# Patient Record
Sex: MALE | Race: WHITE | NOT HISPANIC OR LATINO | Employment: FULL TIME | RURAL
[De-identification: names, ages, dates, MRNs, and addresses within clinical notes are randomized per-mention and may not be internally consistent; named-entity substitution may affect disease eponyms.]

---

## 2017-01-20 ENCOUNTER — OFFICE VISIT (OUTPATIENT)
Dept: FAMILY MEDICINE CLINIC | Facility: CLINIC | Age: 42
End: 2017-01-20

## 2017-01-20 VITALS
BODY MASS INDEX: 40.43 KG/M2 | HEIGHT: 74 IN | OXYGEN SATURATION: 96 % | TEMPERATURE: 98.2 F | WEIGHT: 315 LBS | HEART RATE: 100 BPM | RESPIRATION RATE: 16 BRPM | SYSTOLIC BLOOD PRESSURE: 148 MMHG | DIASTOLIC BLOOD PRESSURE: 100 MMHG

## 2017-01-20 DIAGNOSIS — I10 ESSENTIAL HYPERTENSION: Primary | Chronic | ICD-10-CM

## 2017-01-20 DIAGNOSIS — E78.5 HYPERLIPIDEMIA, UNSPECIFIED HYPERLIPIDEMIA TYPE: Chronic | ICD-10-CM

## 2017-01-20 DIAGNOSIS — R73.01 ELEVATED FASTING GLUCOSE: Chronic | ICD-10-CM

## 2017-01-20 PROBLEM — K21.9 GASTROESOPHAGEAL REFLUX DISEASE: Chronic | Status: ACTIVE | Noted: 2017-01-20

## 2017-01-20 PROBLEM — N46.9 INFERTILITY MALE: Status: ACTIVE | Noted: 2017-01-20

## 2017-01-20 PROBLEM — E66.9 OBESITY: Chronic | Status: ACTIVE | Noted: 2017-01-20

## 2017-01-20 PROBLEM — N20.9 UROLITHIASIS: Chronic | Status: ACTIVE | Noted: 2017-01-20

## 2017-01-20 PROBLEM — Z00.00 WELLNESS EXAMINATION: Status: ACTIVE | Noted: 2017-01-20

## 2017-01-20 PROCEDURE — 99213 OFFICE O/P EST LOW 20 MIN: CPT | Performed by: FAMILY MEDICINE

## 2017-01-20 RX ORDER — TADALAFIL 20 MG/1
20 TABLET ORAL DAILY PRN
COMMUNITY

## 2017-01-20 RX ORDER — DICLOFENAC SODIUM 75 MG/1
1 TABLET, DELAYED RELEASE ORAL 2 TIMES DAILY PRN
COMMUNITY
Start: 2017-01-08 | End: 2017-07-17 | Stop reason: SDUPTHER

## 2017-01-20 RX ORDER — HYDROCHLOROTHIAZIDE 12.5 MG/1
12.5 TABLET ORAL DAILY
Qty: 90 TABLET | Refills: 0 | Status: SHIPPED | OUTPATIENT
Start: 2017-01-20 | End: 2017-03-31 | Stop reason: DRUGHIGH

## 2017-01-20 NOTE — PROGRESS NOTES
Subjective   Hank Hanna is a 42 y.o. male presenting with chief complaint of:   Chief Complaint   Patient presents with   • Heartburn   • Hyperlipidemia   • Hypertension       History of Present Illness :  Alone.  Has chronic illness; HTN, elevated FBS, hyperlipidemia.  Has had for years; chronic HTN. BP for [facility] exam was recently ok.  No regular home monitoring.   No chest pain, SOB, minimal occ leg edema since Norvasc 5 qd.     Other chronic problem/s to consider:  Elevated fasting glucose: This has been present for years/over a year.  It is chronic.  It is controlled.  No home accuchecks have yet been requested.  No signs hypoglycemia manifest as syncope/near syncope or diaphoretic/sweating episodes.   Hyperlipidemia: This has been present for years/over a year.  It is chronic.  Infrequent labs; due  Obesity: This has been present for years/over a year.  It is chronic and advised diet/exercise before.     The following portions of the patient's history were reviewed and updated as appropriate: allergies, current medications, past family history, past medical history, past social history, past surgical history and problem list.  TCC migrated      Current Outpatient Prescriptions:   •  amLODIPine (NORVASC) 5 MG tablet, TAKE 1 TABLET BY MOUTH EVERY DAY, Disp: 90 tablet, Rfl: 1  •  diclofenac (VOLTAREN) 75 MG EC tablet, Take 1 tablet by mouth 2 (Two) Times a Day As Needed., Disp: , Rfl:   •  losartan (COZAAR) 100 MG tablet, 1 TABLET BY MOUTH DAILY, Disp: 90 tablet, Rfl: 1  •  Omeprazole Magnesium (PRILOSEC OTC PO), Take  by mouth Daily., Disp: , Rfl:   •  tadalafil (CIALIS) 20 MG tablet, Take 20 mg by mouth Daily As Needed for erectile dysfunction., Disp: , Rfl:     No Known Allergies    Review of Systems    GENERAL:  Active/slower with limits, speed, samni for age. Sleeps ok. No fever.  ENDO:  No syncope, near or diaphoretic sweaty spells.    HEENT: No head injury or headache,  No vision change, No hearing  "loss/pain/drainage.  No sore throat.  No nasal/sinus congestion/drainage. No epistaxis.  CHEST: No chest wall tenderness or mass. No cough, wheeze, SOB or hemoptysis.  CV: No chest pain, palpatations, ankle edema.  GI: No heartburn, dysphagia, abdominal pain, diarrhea, constipation, rectal bleeding, or melena.  :  Voids without dysuria, or incontience to completion.  ORTHO: No painful/swollen joints but various on /off sore.  No sore neck or back.  No acute neck or back pain without recent injury.   NEURO: No dizziness, weakness of extremities.  No numbness/parethesias.   PSYCH: No memory loss.  Mood good; not anxious, depressed or suicidal.  Tolerated stress.     Work labs about 6m ago; labs ok.     Objective   Visit Vitals   • /100 (BP Location: Right arm, Patient Position: Sitting, Cuff Size: Large Adult)   • Pulse 100   • Temp 98.2 °F (36.8 °C) (Oral)   • Resp 16   • Ht 74\" (188 cm)   • Wt (!) 319 lb (145 kg)   • SpO2 96%   • BMI 40.96 kg/m2       Physical Exam    GENERAL:  Well nourished/developed  in no acute distress. Obese.   SKIN: Turgor excellent, without wound, rash, lesion.   HEENT: Normal cephalic without trauma.  Pupils equal round reactive to light. Extraocular motions full without nystagmus.   External canals nonobstructive nontender without reddness. Tymphatic membranes john with mary structures intact.   Oral cavity without growths, exudates, and moist.  Posterior pharnyx without mass, obstruction, reddness.  No thyroidmegaly, mass, tenderness, lymphadenopathy and supple.   CV: Regular rhythm.  No murmur, gallop, edema. Posterior pulses intact.  No carotid bruits.   CHEST: No chest wall tenderness or mass.   LUNGS: Symmetric motion with clear to auscultation.  No dullness to percussion.   ABD: Soft, nontender without mass.   ORTHO: Symmetric extremities without swelling/point tenderness.  Full gross range of motion.    NEURO: CN 2-12 grossly intact.  Symmetric facies. 2/4 x 4 biceps, " knees equal reflexes.  UE/LE   4-5/5 strength throughout.  Nonfocal use extremities. Speech clear. Intact light touch with monofilament, vibratory sensation with tuning fork; equal toes/distal feet.    PSYCH: Oriented x 3.  Pleasant calm, well kept.  Purposeful/directed conservation with intact short/long gross memory.     Assessment/Plan     1. Essential hypertension  May not be controlled    2. Hyperlipidemia, unspecified hyperlipidemia type  Followed without Rx so far    3. Elevated fasting glucose  watching    Home bp monitoring advised-see below  Rx: reviewed.  Changes above and add HCTZ  LAB: reviewed above, orders above and 6/12m  Wrap-up/other instructions  Regular cardio exercise something everyone should consider and try to do; discussed  Normal weight a goal for everyone; discussed  Healthy diet helpful for weight management, illness prevention; discussed  Patient Instructions   Watch for signs of low potassium; unexpected fatigue, muscle cramps    Your labs should be   6m chemistry and A1c  12m yearly complete labs    Watch your blood pressure; your goal should be 140-130 top and 90-80 bottom      Follow up: Return in about 3 months (around 4/20/2017).

## 2017-01-20 NOTE — PATIENT INSTRUCTIONS
Watch for signs of low potassium; unexpected fatigue, muscle cramps    Your labs should be   6m chemistry and A1c  12m yearly complete labs    Watch your blood pressure; your goal should be 140-130 top and 90-80 bottom

## 2017-01-20 NOTE — MR AVS SNAPSHOT
Hank Hanna   1/20/2017 2:30 PM   Office Visit    Dept Phone:  [phone]   Encounter #:  [id number]    Provider:  Rajan Saxena MD   Department:  [facility]                Your Full Care Plan              Today's Medication Changes          These changes are accurate as of: 1/20/17  3:29 PM.  If you have any questions, ask your nurse or doctor.               New Medication(s)Ordered:     hydrochlorothiazide 12.5 MG tablet   Commonly known as:  HYDRODIURIL   Take 1 tablet by mouth Daily.   Started by:  Rajan Saxena MD         Medication(s)that have changed:     diclofenac 75 MG EC tablet   Commonly known as:  VOLTAREN   Take 1 tablet by mouth 2 (Two) Times a Day As Needed.   What changed:  Another medication with the same name was removed. Continue taking this medication, and follow the directions you see here.   Changed by:  Rajan Saxena MD            Where to Get Your Medications      These medications were sent to [facility]/pharmacy #[id number] - [place], [place] - [address] AT ACROSS FROM [address]  [phone] [facility] [phone]   [address] [place] [address]    Hours:  24-hours Phone:  [phone]     hydrochlorothiazide 12.5 MG tablet                  Your Updated Medication List          This list is accurate as of: 1/20/17  3:29 PM.  Always use your most recent med list.                amLODIPine 5 MG tablet   Commonly known as:  NORVASC   TAKE 1 TABLET BY MOUTH EVERY DAY       CIALIS 20 MG tablet   Generic drug:  tadalafil       diclofenac 75 MG EC tablet   Commonly known as:  VOLTAREN       hydrochlorothiazide 12.5 MG tablet   Commonly known as:  HYDRODIURIL   Take 1 tablet by mouth Daily.       losartan 100 MG tablet   Commonly known as:  COZAAR   1 TABLET BY MOUTH DAILY       PRILOSEC OTC PO               You Were Diagnosed With        Codes Comments    Essential hypertension    -  Primary ICD-10-CM: I10  ICD-9-CM:  "401.9     Hyperlipidemia, unspecified hyperlipidemia type     ICD-10-CM: E78.5  ICD-9-CM: 272.4     Elevated fasting glucose     ICD-10-CM: R73.01  ICD-9-CM: 790.21       Instructions    Watch for signs of low potassium; unexpected fatigue, muscle cramps    Your labs should be   6m chemistry and A1c  12m yearly complete labs    Watch your blood pressure; your goal should be 140-130 top and 90-80 bottom     Patient Instructions History      Upcoming Appointments     Visit Type Date Time Department    FOLLOW UP 1/20/2017  2:30 PM [facility]    FOLLOW UP 5/5/2017  2:45 PM [facility]      MyChart Signup     Our records indicate that you have declined [employer] signup. If you would like to sign up for [employer], please email [email or web address] or call [phone] to obtain an activation code.             Other Info from Your Visit           Your Appointments     May 05, 2017  2:45 PM CDT   Follow Up with Amadeo Fleming MD   [facility] MEDICAL [facility] FAMILY MEDICINE (--)    [address] [place] [id number] [phone]           Arrive 15 minutes prior to appointment.              Allergies     No Known Allergies      Reason for Visit     Heartburn     Hyperlipidemia     Hypertension           Vital Signs     Blood Pressure Pulse Temperature Respirations Height Weight    148/100 (BP Location: Right arm, Patient Position: Sitting, Cuff Size: Large Adult) 100 98.2 °F (36.8 °C) (Oral) 16 74\" (188 cm) 319 lb (145 kg)    Oxygen Saturation Body Mass Index Smoking Status             96% 40.96 kg/m2 Former Smoker         Problems and Diagnoses Noted     Elevated fasting glucose    Acid reflux disease    High cholesterol or triglycerides    High blood pressure    Infertility male    Obesity    Urinary tract stone    Wellness examination        "

## 2017-03-30 ENCOUNTER — TELEPHONE (OUTPATIENT)
Dept: FAMILY MEDICINE CLINIC | Facility: CLINIC | Age: 42
End: 2017-03-30

## 2017-03-30 NOTE — TELEPHONE ENCOUNTER
He says that he has 2 bp meds that Dr. Saxena was going to combine into 1 pill. He thought it was HCTZ and the Amlodipine. [facility] [place]

## 2017-03-31 RX ORDER — AMLODIPINE BESYLATE 5 MG/1
5 TABLET ORAL DAILY
Qty: 90 TABLET | Refills: 1 | Status: SHIPPED | OUTPATIENT
Start: 2017-03-31 | End: 2017-10-13 | Stop reason: SDUPTHER

## 2017-03-31 RX ORDER — LOSARTAN POTASSIUM AND HYDROCHLOROTHIAZIDE 12.5; 1 MG/1; MG/1
1 TABLET ORAL DAILY
Qty: 90 TABLET | Refills: 1 | Status: SHIPPED | OUTPATIENT
Start: 2017-03-31 | End: 2017-09-18 | Stop reason: SDUPTHER

## 2017-03-31 NOTE — TELEPHONE ENCOUNTER
No; it was cozaar 100 he was on and the HCTZ 12,5  we gave him; change that to Hyzaar 100/12.5 one a day  (need to fix chart to say PCP JOSEPHINE Saxena and f/u 5.2017 JOSEPHINE Saxena unless he after seeing me is trying to change to Dr Fleming ?????

## 2017-07-17 RX ORDER — DICLOFENAC SODIUM 75 MG/1
TABLET, DELAYED RELEASE ORAL
Qty: 60 TABLET | Refills: 0 | Status: SHIPPED | OUTPATIENT
Start: 2017-07-17 | End: 2017-08-23 | Stop reason: SDUPTHER

## 2017-08-23 RX ORDER — DICLOFENAC SODIUM 75 MG/1
TABLET, DELAYED RELEASE ORAL
Qty: 60 TABLET | Refills: 0 | Status: SHIPPED | OUTPATIENT
Start: 2017-08-23 | End: 2017-10-30 | Stop reason: SDUPTHER

## 2017-09-18 RX ORDER — LOSARTAN POTASSIUM AND HYDROCHLOROTHIAZIDE 12.5; 1 MG/1; MG/1
TABLET ORAL
Qty: 90 TABLET | Refills: 1 | Status: SHIPPED | OUTPATIENT
Start: 2017-09-18 | End: 2018-02-19 | Stop reason: SDUPTHER

## 2017-10-13 RX ORDER — AMLODIPINE BESYLATE 5 MG/1
TABLET ORAL
Qty: 90 TABLET | Refills: 1 | Status: SHIPPED | OUTPATIENT
Start: 2017-10-13 | End: 2018-04-16 | Stop reason: SDUPTHER

## 2017-10-30 RX ORDER — DICLOFENAC SODIUM 75 MG/1
TABLET, DELAYED RELEASE ORAL
Qty: 60 TABLET | Refills: 0 | Status: SHIPPED | OUTPATIENT
Start: 2017-10-30 | End: 2017-12-06 | Stop reason: SDUPTHER

## 2017-12-06 RX ORDER — DICLOFENAC SODIUM 75 MG/1
TABLET, DELAYED RELEASE ORAL
Qty: 60 TABLET | Refills: 5 | Status: SHIPPED | OUTPATIENT
Start: 2017-12-06 | End: 2018-09-11 | Stop reason: SDUPTHER

## 2018-02-19 RX ORDER — LOSARTAN POTASSIUM AND HYDROCHLOROTHIAZIDE 12.5; 1 MG/1; MG/1
TABLET ORAL
Qty: 90 TABLET | Refills: 1 | Status: SHIPPED | OUTPATIENT
Start: 2018-02-19 | End: 2018-08-16 | Stop reason: SDUPTHER

## 2018-04-16 RX ORDER — AMLODIPINE BESYLATE 5 MG/1
TABLET ORAL
Qty: 90 TABLET | Refills: 0 | Status: SHIPPED | OUTPATIENT
Start: 2018-04-16 | End: 2018-08-05 | Stop reason: SDUPTHER

## 2018-08-06 RX ORDER — AMLODIPINE BESYLATE 5 MG/1
TABLET ORAL
Qty: 90 TABLET | Refills: 1 | Status: SHIPPED | OUTPATIENT
Start: 2018-08-06 | End: 2019-01-24 | Stop reason: SDUPTHER

## 2018-08-16 RX ORDER — LOSARTAN POTASSIUM AND HYDROCHLOROTHIAZIDE 12.5; 1 MG/1; MG/1
TABLET ORAL
Qty: 90 TABLET | Refills: 0 | Status: SHIPPED | OUTPATIENT
Start: 2018-08-16 | End: 2018-10-01 | Stop reason: SDUPTHER

## 2018-09-11 RX ORDER — DICLOFENAC SODIUM 75 MG/1
TABLET, DELAYED RELEASE ORAL
Qty: 60 TABLET | Refills: 0 | Status: SHIPPED | OUTPATIENT
Start: 2018-09-11 | End: 2018-10-28 | Stop reason: SDUPTHER

## 2018-10-01 RX ORDER — LOSARTAN POTASSIUM AND HYDROCHLOROTHIAZIDE 12.5; 1 MG/1; MG/1
TABLET ORAL
Qty: 90 TABLET | Refills: 0 | Status: SHIPPED | OUTPATIENT
Start: 2018-10-01 | End: 2018-12-23 | Stop reason: SDUPTHER

## 2018-10-29 RX ORDER — DICLOFENAC SODIUM 75 MG/1
TABLET, DELAYED RELEASE ORAL
Qty: 60 TABLET | Refills: 0 | Status: SHIPPED | OUTPATIENT
Start: 2018-10-29 | End: 2018-11-27 | Stop reason: SDUPTHER

## 2018-11-27 RX ORDER — DICLOFENAC SODIUM 75 MG/1
75 TABLET, DELAYED RELEASE ORAL 2 TIMES DAILY
Qty: 180 TABLET | Refills: 0 | Status: SHIPPED | OUTPATIENT
Start: 2018-11-27 | End: 2019-03-09 | Stop reason: SDUPTHER

## 2018-12-11 ENCOUNTER — OFFICE VISIT (OUTPATIENT)
Dept: FAMILY MEDICINE CLINIC | Facility: CLINIC | Age: 43
End: 2018-12-11

## 2018-12-11 VITALS
HEIGHT: 74 IN | WEIGHT: 315 LBS | TEMPERATURE: 98.6 F | DIASTOLIC BLOOD PRESSURE: 80 MMHG | SYSTOLIC BLOOD PRESSURE: 142 MMHG | OXYGEN SATURATION: 95 % | HEART RATE: 105 BPM | RESPIRATION RATE: 18 BRPM | BODY MASS INDEX: 40.43 KG/M2

## 2018-12-11 DIAGNOSIS — R73.01 ELEVATED FASTING GLUCOSE: Chronic | ICD-10-CM

## 2018-12-11 DIAGNOSIS — I10 ESSENTIAL HYPERTENSION: Chronic | ICD-10-CM

## 2018-12-11 DIAGNOSIS — E66.01 CLASS 3 SEVERE OBESITY WITH BODY MASS INDEX (BMI) OF 40.0 TO 44.9 IN ADULT, UNSPECIFIED OBESITY TYPE, UNSPECIFIED WHETHER SERIOUS COMORBIDITY PRESENT (HCC): ICD-10-CM

## 2018-12-11 DIAGNOSIS — E78.5 HYPERLIPIDEMIA, UNSPECIFIED HYPERLIPIDEMIA TYPE: Primary | Chronic | ICD-10-CM

## 2018-12-11 DIAGNOSIS — K21.9 GASTROESOPHAGEAL REFLUX DISEASE, ESOPHAGITIS PRESENCE NOT SPECIFIED: Chronic | ICD-10-CM

## 2018-12-11 PROBLEM — Z01.89 LABORATORY TEST: Status: ACTIVE | Noted: 2018-12-11

## 2018-12-11 PROBLEM — E66.813 CLASS 3 SEVERE OBESITY IN ADULT: Status: ACTIVE | Noted: 2017-01-20

## 2018-12-11 PROBLEM — Z87.442 HISTORY OF KIDNEY STONES: Status: ACTIVE | Noted: 2017-01-20

## 2018-12-11 LAB
ALBUMIN SERPL-MCNC: 4.5 G/DL (ref 3.5–5)
ALBUMIN/GLOB SERPL: 1.5 G/DL (ref 1.1–2.5)
ALP SERPL-CCNC: 71 U/L (ref 24–120)
ALT SERPL-CCNC: 75 U/L (ref 0–54)
AST SERPL-CCNC: 37 U/L (ref 7–45)
BASOPHILS # BLD AUTO: 0.06 10*3/MM3 (ref 0–0.2)
BASOPHILS NFR BLD AUTO: 0.9 % (ref 0–2)
BILIRUB SERPL-MCNC: 0.8 MG/DL (ref 0.1–1)
BUN SERPL-MCNC: 12 MG/DL (ref 5–21)
BUN/CREAT SERPL: 14 (ref 7–25)
CALCIUM SERPL-MCNC: 9.2 MG/DL (ref 8.4–10.4)
CHLORIDE SERPL-SCNC: 100 MMOL/L (ref 98–110)
CHOLEST SERPL-MCNC: 225 MG/DL (ref 130–200)
CO2 SERPL-SCNC: 28 MMOL/L (ref 24–31)
CREAT SERPL-MCNC: 0.86 MG/DL (ref 0.5–1.4)
EOSINOPHIL # BLD AUTO: 0.08 10*3/MM3 (ref 0–0.7)
EOSINOPHIL NFR BLD AUTO: 1.1 % (ref 0–4)
ERYTHROCYTE [DISTWIDTH] IN BLOOD BY AUTOMATED COUNT: 13.6 % (ref 12–15)
GLOBULIN SER CALC-MCNC: 3.1 GM/DL
GLUCOSE SERPL-MCNC: 120 MG/DL (ref 70–100)
HBA1C MFR BLD: 6 %
HCT VFR BLD AUTO: 41.4 % (ref 40–52)
HDLC SERPL-MCNC: 44 MG/DL
HGB BLD-MCNC: 13.4 G/DL (ref 14–18)
IMM GRANULOCYTES # BLD: 0.06 10*3/MM3 (ref 0–0.03)
IMM GRANULOCYTES NFR BLD: 0.9 % (ref 0–5)
LDLC SERPL CALC-MCNC: 146 MG/DL (ref 0–99)
LYMPHOCYTES # BLD AUTO: 1.65 10*3/MM3 (ref 0.72–4.86)
LYMPHOCYTES NFR BLD AUTO: 23.4 % (ref 15–45)
MCH RBC QN AUTO: 27.9 PG (ref 28–32)
MCHC RBC AUTO-ENTMCNC: 32.4 G/DL (ref 33–36)
MCV RBC AUTO: 86.1 FL (ref 82–95)
MONOCYTES # BLD AUTO: 0.64 10*3/MM3 (ref 0.19–1.3)
MONOCYTES NFR BLD AUTO: 9.1 % (ref 4–12)
NEUTROPHILS # BLD AUTO: 4.55 10*3/MM3 (ref 1.87–8.4)
NEUTROPHILS NFR BLD AUTO: 64.6 % (ref 39–78)
NRBC BLD AUTO-RTO: 0 /100 WBC (ref 0–0)
PLATELET # BLD AUTO: 324 10*3/MM3 (ref 130–400)
POTASSIUM SERPL-SCNC: 4.4 MMOL/L (ref 3.5–5.3)
PROT SERPL-MCNC: 7.6 G/DL (ref 6.3–8.7)
RBC # BLD AUTO: 4.81 10*6/MM3 (ref 4.8–5.9)
SODIUM SERPL-SCNC: 141 MMOL/L (ref 135–145)
TRIGL SERPL-MCNC: 174 MG/DL (ref 0–149)
VLDLC SERPL CALC-MCNC: 34.8 MG/DL
WBC # BLD AUTO: 7.04 10*3/MM3 (ref 4.8–10.8)

## 2018-12-11 PROCEDURE — 99214 OFFICE O/P EST MOD 30 MIN: CPT | Performed by: FAMILY MEDICINE

## 2018-12-11 NOTE — PROGRESS NOTES
Subjective   Hank Hanna is a 43 y.o. male presenting with chief complaint of:   Chief Complaint   Patient presents with   • Hypertension     Follow-up for medication refills.   • Hyperlipidemia   • Heartburn       History of Present Illness :  Alone.   Has multiple chronic problems to consider that might have a bearing on today's issues;  an interval appointment.       Chronic/acute problems reviewed today:   1. Hyperlipidemia, unspecified hyperlipidemia type: Chronic/stable: prefers no statin, diet-exercise advised and lab monitored.      2. Essential hypertension: Chronic/stable. Stable here/if home blood pressures.  No significant chest pain, SOB, LE edema, orthopnea, near syncope, dizziness/light headness.      3. Gastroesophageal reflux disease, esophagitis presence not specified: Chronic/stable.  Controlled heartburn, reflux; no dysphagia, melena.  Rx prilosec helps.      4. Class 3 severe obesity with body mass index (BMI) of 40.0 to 44.9 in adult, unspecified obesity type, unspecified whether serious comorbidity present (CMS/[facility]): Chronic/stable.  Aware, advised weight loss before.  On/off some success with weight loss but often with weight gain back.      5. Elevated fasting glucose: Chronic/stable. No problem/pattern hypoglycemia/hyperglycemia manifest by poly- dypsia, phagia, uria, or sweats, diaphoretic episodes, syncope/near.       Has an/another acute issue today: none.    The following portions of the patient's history were reviewed and updated as appropriate: allergies, current medications, past family history, past medical history, past social history, past surgical history and problem list.      Current Outpatient Medications:   •  amLODIPine (NORVASC) 5 MG tablet, TAKE 1 TABLET BY MOUTH DAILY., Disp: 90 tablet, Rfl: 1  •  diclofenac (VOLTAREN) 75 MG EC tablet, Take 1 tablet by mouth 2 (Two) Times a Day., Disp: 180 tablet, Rfl: 0  •  losartan-hydrochlorothiazide (HYZAAR) 100-12.5 MG per tablet,  "TAKE 1 TABLET BY MOUTH DAILY., Disp: 90 tablet, Rfl: 0  •  Omeprazole Magnesium (PRILOSEC OTC PO), Take  by mouth Daily., Disp: , Rfl:   •  tadalafil (CIALIS) 20 MG tablet, Take 20 mg by mouth Daily As Needed for erectile dysfunction., Disp: , Rfl:     No problems with medications.  Refills if needed done    No Known Allergies    Review of Systems  GENERAL:  Active/slower with limits, speed, samni for age. Sleeps ok. No fever.  ENDO:  No syncope, near or diaphoretic sweaty spells.    HEENT: No head injury or headache.   No vision change.  No hearing loss/pain/drainage.  No sore throat.  No nasal/sinus congestion/drainage. No epistaxis.  CHEST: No chest wall tenderness or mass. No cough, wheeze, SOB or hemoptysis.  CV: No chest pain, palpatations, ankle edema.  GI: No heartburn, dysphagia, abdominal pain, diarrhea, constipation, rectal bleeding, or melena.  :  Voids without dysuria, or incontience to completion.  ORTHO: No painful/swollen joints but various on /off sore.  No sore neck or back.  No acute neck or back pain without recent injury.   NEURO: No dizziness, weakness of extremities.  No numbness/parethesias.   PSYCH: No memory loss.  Mood good; not anxious, depressed or suicidal.  Tolerated stress.     Screening:  Mammogram: NA  Bone density: NA  Low dose CT chest: Tobacco-smoker/age 14/<1 ppd/dc 12.1.2013-NA  GI: NA  Prostate: NA  Usual lab order  6m BMP, A1c  12m CBC, CMP, A1c, LIPID    Objective   /80 (BP Location: Left arm, Patient Position: Sitting, Cuff Size: Adult)   Pulse 105   Temp 98.6 °F (37 °C) (Oral)   Resp 18   Ht 188 cm (74\")   Wt (!) 147 kg (325 lb)   SpO2 95%   BMI 41.73 kg/m²   Body mass index is 41.73 kg/m².    Physical Exam  GENERAL:  Well nourished/developed  in no acute distress. Obese.   SKIN: Turgor excellent, without wound, rash, lesion.   HEENT: Normal cephalic without trauma.  Pupils equal round reactive to light. Extraocular motions full without nystagmus.   External " canals nonobstructive nontender without reddness. Tymphatic membranes john with mary structures intact.   Oral cavity without growths, exudates, and moist.  Posterior pharnyx without mass, obstruction, reddness.  No thyroidmegaly, mass, tenderness, lymphadenopathy and supple.   CV: Regular rhythm.  No murmur, gallop, edema. Posterior pulses intact.  No carotid bruits.   CHEST: No chest wall tenderness or mass.   LUNGS: Symmetric motion with clear to auscultation.  No dullness to percussion.   ABD: Soft, nontender without mass.   ORTHO: Symmetric extremities without swelling/point tenderness.  Full gross range of motion.    NEURO: CN 2-12 grossly intact.  Symmetric facies. 2/4 x 4 biceps, knees equal reflexes.  UE/LE   4-5/5 strength throughout.  Nonfocal use extremities. Speech clear. Intact light touch with monofilament, vibratory sensation with tuning fork; equal toes/distal feet.    PSYCH: Oriented x 3.  Pleasant calm, well kept.  Purposeful/directed conservation with intact short/long gross memory.      Assessment/Plan     1. Hyperlipidemia, unspecified hyperlipidemia type    2. Essential hypertension    3. Gastroesophageal reflux disease, esophagitis presence not specified    4. Class 3 severe obesity with body mass index (BMI) of 40.0 to 44.9 in adult, unspecified obesity type, unspecified whether serious comorbidity present (CMS/[facility])    5. Elevated fasting glucose        Rx: reviewed/changes:  No orders of the defined types were placed in this encounter.      LAB/Testing/Referrals: reviewed/orders:   Today:   Orders Placed This Encounter   Procedures   • Comprehensive Metabolic Panel   • Lipid Panel   • Hemoglobin A1c   • CBC & Differential       Discussions:   Primarily weight loss  Body mass index is 41.73 kg/m².   Patient's Body mass index is 41.73 kg/m². BMI is above normal parameters. Recommendations include: exercise counseling, nutrition counseling and some weight loss.  Non-smoker      Patient  Instructions   Your weight today is too high.  You should consider some degree of weight loss (consistant with your abilities and your other health issues)      The fundamentals for wanting to lose weight are:   1. You need a plan.  Your plan might/should likely not be the same as other people (it's what works for you that works)  2. You need to know how much you want to lose.  For health purposes 10% of your body weight is a good start and often a reasonable amount to lose AND keep off.   3. Your plan will need a start date (after the wedding, etc).    4. I advise a plan for what you want to lose each week and therefore you can come close to knowing how long it will take for you to reach your goal.  5. With the above; then you need to decide how many calories you may eat each day.  Using a phone umang like [employer], or [employer] will calculate that for you.  We will be happy to do this if you need our help.  We do not advise fasting; calories below 1000 jackie a day for anyone.  Stick to your daily calories by controlling sweets, snacks, portion size, and binge eating.   6. You will want to decide how much exercise you can, want or like to do to make this weight loss happen.  Exercise is always important for a weight loss program.   Be sure however and pick one that will not interere with your other health problems.  We would be happy to give you suggestions.  The most effective exercise is something you enjoy as you need to do this regularily.   7. Consider an accountability aide whether it be the phone umang, a friend joining you on this endeavor, a membership to a gym, a referral to a weight loss program; whatever works for you.           Follow up: Return for lab today and lab 6m  and lab/dr saxena 12m.  Future Appointments   Date Time Provider Department Center   6/17/2019  8:15 AM LAB [facility] None   12/17/2019  8:10 AM LAB [facility] None   12/20/2019  9:30 AM Rajan Saxena  MD [facility] None

## 2018-12-11 NOTE — PATIENT INSTRUCTIONS
Your weight today is too high.  You should consider some degree of weight loss (consistant with your abilities and your other health issues)      The fundamentals for wanting to lose weight are:   1. You need a plan.  Your plan might/should likely not be the same as other people (it's what works for you that works)  2. You need to know how much you want to lose.  For health purposes 10% of your body weight is a good start and often a reasonable amount to lose AND keep off.   3. Your plan will need a start date (after the wedding, etc).    4. I advise a plan for what you want to lose each week and therefore you can come close to knowing how long it will take for you to reach your goal.  5. With the above; then you need to decide how many calories you may eat each day.  Using a phone umang like [employer], or [employer] will calculate that for you.  We will be happy to do this if you need our help.  We do not advise fasting; calories below 1000 jackie a day for anyone.  Stick to your daily calories by controlling sweets, snacks, portion size, and binge eating.   6. You will want to decide how much exercise you can, want or like to do to make this weight loss happen.  Exercise is always important for a weight loss program.   Be sure however and pick one that will not interere with your other health problems.  We would be happy to give you suggestions.  The most effective exercise is something you enjoy as you need to do this regularily.   7. Consider an accountability aide whether it be the phone umang, a friend joining you on this endeavor, a membership to a gym, a referral to a weight loss program; whatever works for you.

## 2018-12-26 RX ORDER — LOSARTAN POTASSIUM AND HYDROCHLOROTHIAZIDE 12.5; 1 MG/1; MG/1
TABLET ORAL
Qty: 90 TABLET | Refills: 1 | Status: SHIPPED | OUTPATIENT
Start: 2018-12-26 | End: 2019-03-18 | Stop reason: SDUPTHER

## 2019-01-24 RX ORDER — AMLODIPINE BESYLATE 5 MG/1
TABLET ORAL
Qty: 90 TABLET | Refills: 1 | Status: SHIPPED | OUTPATIENT
Start: 2019-01-24 | End: 2019-06-09 | Stop reason: SDUPTHER

## 2019-03-11 RX ORDER — DICLOFENAC SODIUM 75 MG/1
TABLET, DELAYED RELEASE ORAL
Qty: 180 TABLET | Refills: 0 | Status: SHIPPED | OUTPATIENT
Start: 2019-03-11 | End: 2019-09-07 | Stop reason: SDUPTHER

## 2019-03-18 RX ORDER — LOSARTAN POTASSIUM AND HYDROCHLOROTHIAZIDE 12.5; 1 MG/1; MG/1
TABLET ORAL
Qty: 90 TABLET | Refills: 0 | Status: SHIPPED | OUTPATIENT
Start: 2019-03-18 | End: 2019-09-09 | Stop reason: SDUPTHER

## 2019-06-10 RX ORDER — AMLODIPINE BESYLATE 5 MG/1
TABLET ORAL
Qty: 90 TABLET | Refills: 1 | Status: SHIPPED | OUTPATIENT
Start: 2019-06-10 | End: 2019-10-07

## 2019-09-09 RX ORDER — HYDROCHLOROTHIAZIDE 12.5 MG/1
12.5 TABLET ORAL DAILY
Qty: 30 TABLET | Refills: 3 | Status: SHIPPED | OUTPATIENT
Start: 2019-09-09 | End: 2019-09-11 | Stop reason: CLARIF

## 2019-09-09 RX ORDER — LOSARTAN POTASSIUM AND HYDROCHLOROTHIAZIDE 12.5; 1 MG/1; MG/1
TABLET ORAL
Qty: 90 TABLET | Refills: 1 | Status: SHIPPED | OUTPATIENT
Start: 2019-09-09 | End: 2019-09-09 | Stop reason: CLARIF

## 2019-09-09 RX ORDER — LOSARTAN POTASSIUM 100 MG/1
100 TABLET ORAL DAILY
Qty: 30 TABLET | Refills: 3 | Status: SHIPPED | OUTPATIENT
Start: 2019-09-09 | End: 2019-09-11 | Stop reason: CLARIF

## 2019-09-09 RX ORDER — DICLOFENAC SODIUM 75 MG/1
TABLET, DELAYED RELEASE ORAL
Qty: 180 TABLET | Refills: 1 | Status: SHIPPED | OUTPATIENT
Start: 2019-09-09 | End: 2020-03-19

## 2019-09-11 ENCOUNTER — TELEPHONE (OUTPATIENT)
Dept: FAMILY MEDICINE CLINIC | Facility: CLINIC | Age: 44
End: 2019-09-11

## 2019-09-11 DIAGNOSIS — I10 ESSENTIAL HYPERTENSION: Primary | ICD-10-CM

## 2019-09-11 RX ORDER — CANDESARTAN CILEXETIL AND HYDROCHLOROTHIAZIDE 32; 12.5 MG/1; MG/1
1 TABLET ORAL DAILY
Qty: 90 TABLET | Refills: 3 | Status: SHIPPED | OUTPATIENT
Start: 2019-09-11 | End: 2019-10-07 | Stop reason: CLARIF

## 2019-09-11 NOTE — TELEPHONE ENCOUNTER
[facility] sent [facility], Losartan-Hctz 100-12.5 is on back order  Wants to know if you would consider changing to   1-Telmisartan-hctz 80-12.5mg  2-olmesartan-hyim63-59.5 tab  3-Candesartan-Hctz 32-12.5 tab  4-irbesartan-hctz 300-12.5 tab    Go with  3-Candesartan-Hctz 32-12.5 tab  We have tried to find an alternative for your previous blood pressure/heart medication and come as close as possible to what we feel would be an equal dose.  However, this is a change in medication and deserves a close watching of your blood pressure for 2-3 weeks after the change to be sure the blood pressure numbers remain stable.

## 2019-09-11 NOTE — TELEPHONE ENCOUNTER
[facility] sent fax, Losartan-Hctz 100-12.5 is on back order  Wants to know if you would consider changing to   1-Telmisartan-hctz 80-12.5mg  2-olmesartan-pfnz03-00.5 tab  3-Candesartan-Hctz 32-12.5 tab  4-irbesartan-hctz 300-12.5 tab

## 2019-09-11 NOTE — TELEPHONE ENCOUNTER
"Notified pt and stated \"im on the boat and will get it then and check my blood pressure\" E-rx done  "

## 2019-10-02 ENCOUNTER — TELEPHONE (OUTPATIENT)
Dept: FAMILY MEDICINE CLINIC | Facility: CLINIC | Age: 44
End: 2019-10-02

## 2019-10-02 NOTE — TELEPHONE ENCOUNTER
Pt called and wanted to see if there is another medication to replace Amlodipine due to the side affect of blocking calcium.  He is having problems with his gums.

## 2019-10-07 ENCOUNTER — OFFICE VISIT (OUTPATIENT)
Dept: FAMILY MEDICINE CLINIC | Facility: CLINIC | Age: 44
End: 2019-10-07

## 2019-10-07 VITALS
WEIGHT: 315 LBS | HEART RATE: 104 BPM | HEIGHT: 74 IN | BODY MASS INDEX: 40.43 KG/M2 | OXYGEN SATURATION: 98 % | RESPIRATION RATE: 18 BRPM | DIASTOLIC BLOOD PRESSURE: 88 MMHG | SYSTOLIC BLOOD PRESSURE: 124 MMHG | TEMPERATURE: 98.4 F

## 2019-10-07 DIAGNOSIS — K21.9 GASTROESOPHAGEAL REFLUX DISEASE, ESOPHAGITIS PRESENCE NOT SPECIFIED: Chronic | ICD-10-CM

## 2019-10-07 DIAGNOSIS — I10 ESSENTIAL HYPERTENSION: Primary | ICD-10-CM

## 2019-10-07 DIAGNOSIS — R73.01 ELEVATED FASTING GLUCOSE: Chronic | ICD-10-CM

## 2019-10-07 DIAGNOSIS — M25.569 KNEE PAIN, UNSPECIFIED CHRONICITY, UNSPECIFIED LATERALITY: ICD-10-CM

## 2019-10-07 DIAGNOSIS — E66.01 CLASS 3 SEVERE OBESITY WITH BODY MASS INDEX (BMI) OF 40.0 TO 44.9 IN ADULT, UNSPECIFIED OBESITY TYPE, UNSPECIFIED WHETHER SERIOUS COMORBIDITY PRESENT (HCC): ICD-10-CM

## 2019-10-07 PROBLEM — E66.9 OBESITY: Status: ACTIVE | Noted: 2017-01-20

## 2019-10-07 PROCEDURE — 99214 OFFICE O/P EST MOD 30 MIN: CPT | Performed by: FAMILY MEDICINE

## 2019-10-07 RX ORDER — LOSARTAN POTASSIUM 100 MG/1
100 TABLET ORAL DAILY
Qty: 90 TABLET | Refills: 3 | Status: SHIPPED | OUTPATIENT
Start: 2019-10-07 | End: 2020-03-30 | Stop reason: SDUPTHER

## 2019-10-07 RX ORDER — HYDROCHLOROTHIAZIDE 12.5 MG/1
12.5 TABLET ORAL DAILY
Qty: 90 TABLET | Refills: 3 | Status: ON HOLD | OUTPATIENT
Start: 2019-10-07 | End: 2020-10-23

## 2019-10-07 RX ORDER — NEOMYCIN/POLYMYXIN B/PRAMOXINE 3.5-10K-1
CREAM (GRAM) TOPICAL 2 TIMES DAILY
COMMUNITY

## 2019-10-07 RX ORDER — METOPROLOL SUCCINATE 50 MG/1
50 TABLET, EXTENDED RELEASE ORAL DAILY
Qty: 30 TABLET | Refills: 5 | Status: SHIPPED | OUTPATIENT
Start: 2019-10-07 | End: 2020-02-17

## 2019-10-07 RX ORDER — LOSARTAN POTASSIUM AND HYDROCHLOROTHIAZIDE 12.5; 1 MG/1; MG/1
1 TABLET ORAL DAILY
Qty: 90 TABLET | Refills: 1
Start: 2019-10-07 | End: 2019-10-07 | Stop reason: CLARIF

## 2019-10-07 NOTE — PROGRESS NOTES
"Subjective   Hank Hanna is a 44 y.o. male presenting with chief complaint of:   Chief Complaint   Patient presents with   • Medication Problem     norvasc \"it is causing my gums to grow and the dentist said it is due to the medications is causing it'   • Knee Pain       History of Present Illness :  Alone.  Here for primarily an acute issue today; gum hyperplasia on norvasc.       Has multiple chronic problems to consider that might have a bearing on today's issues; not an interval appointment.       Chronic/acute problems reviewed today:   1. Essential hypertension: Chronic/stable. Stable here/if home blood pressures.  No significant chest pain, SOB, LE edema, orthopnea, near syncope, dizziness/light headness.       2. Gastroesophageal reflux disease, esophagitis presence not specified: Chronic/stable.  Controlled heartburn, reflux; no dysphagia, melena.  Rx helps.     3. Elevated fasting glucose: Chronic/stable.  No problem/pattern hypoglycemia/hyperglycemia manifest by poly- dypsia, phagia, uria, or sweats, diaphoretic episodes, syncope/near.     4. Knee pain, unspecified chronicity, unspecified laterality: chronic/worsening.  Two prior arthroscopic procedures R and L; R hurts rest and activity now.    5. Class 3 severe obesity with body mass index (BMI) of 40.0 to 44.9 in adult, unspecified obesity type, unspecified whether serious comorbidity present (CMS/[facility]): chronic/worseing.  Cannot lose weight now.  qysmia mentioned years ago.      Has an/another acute issue today: none.    The following portions of the patient's history were reviewed and updated as appropriate: allergies, current medications, past family history, past medical history, past social history, past surgical history and problem list.      Current Outpatient Medications:   •  amLODIPine (NORVASC) 5 MG tablet, TAKE 1 TABLET BY MOUTH DAILY., Disp: 90 tablet, Rfl: 1  •  diclofenac (VOLTAREN) 75 MG EC tablet, TAKE 1 TABLET BY MOUTH TWICE A DAY, " "Disp: 180 tablet, Rfl: 1  •  Multiple Vitamins-Minerals (MULTI-VITAMIN GUMMIES) chewable tablet, Chew 2 (Two) Times a Day., Disp: , Rfl:   •  Omeprazole Magnesium (PRILOSEC OTC PO), Take  by mouth Daily., Disp: , Rfl:   •  tadalafil (CIALIS) 20 MG tablet, Take 20 mg by mouth Daily As Needed for erectile dysfunction., Disp: , Rfl:   •  hydroCHLOROthiazide (HYDRODIURIL) 12.5 MG tablet, Take 1 tablet by mouth Daily., Disp: 90 tablet, Rfl: 3  •  losartan (COZAAR) 100 MG tablet, Take 1 tablet by mouth Daily., Disp: 90 tablet, Rfl: 3    No problems with medications.  Refills if needed done    Allergies   Allergen Reactions   • Lisinopril Cough     \"made me cough\"   • Norvasc [Amlodipine] Other (See Comments)     Gum issue       Review of Systems  GENERAL:  Active/slower with limits, speed, samni for age. Sleeps ok. No fever.  ENDO:  No syncope, near or diaphoretic sweaty spells.    HEENT: No head injury or headache.   No vision change.  No hearing loss/pain/drainage.  No sore throat.  No nasal/sinus congestion/drainage. No epistaxis.  CHEST: No chest wall tenderness or mass. No cough, wheeze, SOB or hemoptysis.  CV: No chest pain, palpatations, ankle edema.  GI: No heartburn, dysphagia, abdominal pain, diarrhea, constipation, rectal bleeding, or melena.  :  Voids without dysuria, or incontience to completion.  ORTHO: No painful/swollen joints but various on /off sore especially knees.  No sore neck or back.  No acute neck or back pain without recent injury.   NEURO: No dizziness, weakness of extremities.  No numbness/parethesias.   PSYCH: No memory loss.  Mood good; not anxious, depressed or suicidal.  Tolerated stress.      Screening:  Mammogram: NA  Bone density: NA  Low dose CT chest: Tobacco-smoker/age 14/<1 ppd/dc 12.1.2013 (22)-NA  GI: NA  Prostate: NA  Usual lab order  6m BMP, A1c  12m CBC, CMP, A1c, LIPID    Tobacco-smoker/age 14/<1 ppd/dc 12.1.2013    Lab Results:  Results for orders placed or performed in " visit on 12/11/18   Comprehensive Metabolic Panel   Result Value Ref Range    Glucose 120 (H) 70 - 100 mg/dL    BUN 12 5 - 21 mg/dL    Creatinine 0.86 0.50 - 1.40 mg/dL    eGFR Non African Am 96 >60 mL/min/1.73    eGFR African Am 117 >60 mL/min/1.73    BUN/Creatinine Ratio 14.0 7.0 - 25.0    Sodium 141 135 - 145 mmol/L    Potassium 4.4 3.5 - 5.3 mmol/L    Chloride 100 98 - 110 mmol/L    Total CO2 28.0 24.0 - 31.0 mmol/L    Calcium 9.2 8.4 - 10.4 mg/dL    Total Protein 7.6 6.3 - 8.7 g/dL    Albumin 4.50 3.50 - 5.00 g/dL    Globulin 3.1 gm/dL    A/G Ratio 1.5 1.1 - 2.5 g/dL    Total Bilirubin 0.8 0.1 - 1.0 mg/dL    Alkaline Phosphatase 71 24 - 120 U/L    AST (SGOT) 37 7 - 45 U/L    ALT (SGPT) 75 (H) 0 - 54 U/L   Lipid Panel   Result Value Ref Range    Total Cholesterol 225 (H) 130 - 200 mg/dL    Triglycerides 174 (H) 0 - 149 mg/dL    HDL Cholesterol 44 >=40 mg/dL    VLDL Cholesterol 34.8 mg/dL    LDL Cholesterol  146 (H) 0 - 99 mg/dL   Hemoglobin A1c   Result Value Ref Range    Hemoglobin A1C 6.00 %   CBC & Differential   Result Value Ref Range    WBC 7.04 4.80 - 10.80 10*3/mm3    RBC 4.81 4.80 - 5.90 10*6/mm3    Hemoglobin 13.4 (L) 14.0 - 18.0 g/dL    Hematocrit 41.4 40.0 - 52.0 %    MCV 86.1 82.0 - 95.0 fL    MCH 27.9 (L) 28.0 - 32.0 pg    MCHC 32.4 (L) 33.0 - 36.0 g/dL    RDW 13.6 12.0 - 15.0 %    Platelets 324 130 - 400 10*3/mm3    Neutrophil Rel % 64.6 39.0 - 78.0 %    Lymphocyte Rel % 23.4 15.0 - 45.0 %    Monocyte Rel % 9.1 4.0 - 12.0 %    Eosinophil Rel % 1.1 0.0 - 4.0 %    Basophil Rel % 0.9 0.0 - 2.0 %    Neutrophils Absolute 4.55 1.87 - 8.40 10*3/mm3    Lymphocytes Absolute 1.65 0.72 - 4.86 10*3/mm3    Monocytes Absolute 0.64 0.19 - 1.30 10*3/mm3    Eosinophils Absolute 0.08 0.00 - 0.70 10*3/mm3    Basophils Absolute 0.06 0.00 - 0.20 10*3/mm3    Immature Granulocyte Rel % 0.9 0.0 - 5.0 %    Immature Grans Absolute 0.06 (H) 0.00 - 0.03 10*3/mm3    nRBC 0.0 0.0 - 0.0 /100 WBC       A1C:  Lab Results - Last 18  "Months   Lab Units 12/11/18  0838   HEMOGLOBIN A1C % 6.00     PSA:No results for input(s): PSA in the last 03218 hours.  CBC:  Lab Results - Last 18 Months   Lab Units 12/11/18  0838   WBC 10*3/mm3 7.04   HEMOGLOBIN g/dL 13.4*   HEMATOCRIT % 41.4   PLATELETS 10*3/mm3 324      BMP/CMP:  Lab Results - Last 18 Months   Lab Units 12/11/18  0838   SODIUM mmol/L 141   POTASSIUM mmol/L 4.4   CHLORIDE mmol/L 100   TOTAL CO2 mmol/L 28.0   GLUCOSE mg/dL 120*   BUN mg/dL 12   CREATININE mg/dL 0.86   EGFR IF NONAFRICN AM mL/min/1.73 96   EGFR IF AFRICN AM mL/min/1.73 117   CALCIUM mg/dL 9.2     HEPATIC:  Lab Results - Last 18 Months   Lab Units 12/11/18  0838   ALT (SGPT) U/L 75*   AST (SGOT) U/L 37   ALK PHOS U/L 71     THYROID:No results for input(s): TSH, T3FREE, FTI in the last 34666 hours.    Invalid input(s): T4FREE, T3, T4, TEUP,  TOTALT4    Objective   /88 (BP Location: Left arm, Patient Position: Sitting, Cuff Size: Large Adult)   Pulse 104   Temp 98.4 °F (36.9 °C) (Oral)   Resp 18   Ht 188 cm (74\")   Wt (!) 156 kg (343 lb)   SpO2 98%   BMI 44.04 kg/m²   Body mass index is 44.04 kg/m².    Physical Exam  GENERAL:  Well nourished/developed  in no acute distress. Obese.   SKIN: Turgor excellent, without wound, rash, lesion.   HEENT: Normal cephalic without trauma.  Pupils equal round reactive to light. Extraocular motions full without nystagmus.   External canals nonobstructive nontender without reddness. Tymphatic membranes john with mary structures intact.   Oral cavity without growths, exudates, and moist.  Posterior pharnyx without mass, obstruction, reddness.  No thyroidmegaly, mass, tenderness, lymphadenopathy and supple.   CV: Regular rhythm.  No murmur, gallop, edema. Posterior pulses intact.  No carotid bruits.   CHEST: No chest wall tenderness or mass.   LUNGS: Symmetric motion with clear to auscultation.  No dullness to percussion.   ABD: Soft, nontender without mass.   ORTHO: Symmetric " extremities without swelling/point tenderness other than medial R joint line.  Full gross range of motion.    NEURO: CN 2-12 grossly intact.  Symmetric facies. 2/4 x 4 biceps, knees equal reflexes.  UE/LE   4-5/5 strength throughout.  Nonfocal use extremities. Speech clear. Intact light touch with monofilament, vibratory sensation with tuning fork; equal toes/distal feet.    PSYCH: Oriented x 3.  Pleasant calm, well kept.  Purposeful/directed conservation with intact short/long gross memory.     Assessment/Plan     1. Essential hypertension    2. Gastroesophageal reflux disease, esophagitis presence not specified    3. Elevated fasting glucose    4. Knee pain, unspecified chronicity, unspecified laterality    5. Class 3 severe obesity with body mass index (BMI) of 40.0 to 44.9 in adult, unspecified obesity type, unspecified whether serious comorbidity present (CMS/[facility])        Rx: reviewed/changes:  New Medications Ordered This Visit   Medications   • hydroCHLOROthiazide (HYDRODIURIL) 12.5 MG tablet     Sig: Take 1 tablet by mouth Daily.     Dispense:  90 tablet     Refill:  3   • losartan (COZAAR) 100 MG tablet     Sig: Take 1 tablet by mouth Daily.     Dispense:  90 tablet     Refill:  3   • metoprolol succinate XL (TOPROL XL) 50 MG 24 hr tablet     Sig: Take 1 tablet by mouth Daily.     Dispense:  30 tablet     Refill:  5       LAB/Testing/Referrals: reviewed/orders:   Today:   Orders Placed This Encounter   Procedures   • XR Knee 1 or 2 View Right     Chronic/recurrent labs above or change to:   Same; due     Discussions:   Weight loss for health/knees  ? Weight loss referral/surgery  Body mass index is 44.04 kg/m².  Patient's Body mass index is 44.04 kg/m². BMI is above normal parameters. Recommendations include: await labs to consider options.    Tobacco use reviewed:    Non-smoker    Patient Instructions   Goals for your blood pressure are 130-140 range top and 80-90 range bottom and you feeling ok.     We  really advise rechecking your blood pressure at home (or with a friend) daily to every other day for the next several days and let us know if your pattern is not the goals above.  If you will do this make sure you control variables that can make your blood pressure show higher than it really is:   A. Use a machine that measures your blood pressure at the upper arm level; not wrist or lower  B. Take off any shirts/garmets and apply the cuff to your bare arm  C. Be sure and rest your arm being used on a table/like so it is totally supported  D. Do not cross your legs while taking your blood pressure  E. Be calm, rested and not upset/anxious in any way while taking your blood pressure  F. Avoid talking while taking the blood pressure  G. Be sure your back is supported and not in a strain while taking your blood pressure.     If you cannot do this at home/with a friend OR want it done here we are happy to make appointments here for that (we only charge/bill for a nurse visit for doing this).      Please CALL US if your blood pressure stays up as that probably means you have a problem; we likely will adjust things even over the phone.  We want your blood pressure to be normal.     ########################    Your weight today is too high.  You should consider some degree of weight loss (consistant with your abilities and your other health issues)      The fundamentals for wanting to lose weight are:   1. You need a plan.  Your plan might/should likely not be the same as other people (it's what works for you that works)  2. You need to know how much you want to lose.  For health purposes 10% of your body weight is a good start and often a reasonable amount to lose AND keep off.   3. Your plan will need a start date (after the wedding, etc).    4. I advise a plan for what you want to lose each week and therefore you can come close to knowing how long it will take for you to reach your goal.  5. With the above; then you  need to decide how many calories you may eat each day.  Using a phone umang like [employer] IT, or [employer] will calculate that for you.  We will be happy to do this if you need our help.  We do not advise fasting; calories below 1000 jackie a day for anyone.  Stick to your daily calories by controlling sweets, snacks, portion size, and binge eating.   6. You will want to decide how much exercise you can, want or like to do to make this weight loss happen.  Exercise is always important for a weight loss program.   Be sure however and pick one that will not interere with your other health problems.  We would be happy to give you suggestions.  The most effective exercise is something you enjoy as you need to do this regularily.   7. Consider an accountability aide whether it be the phone umang, a friend joining you on this endeavor, a membership to a gym, a referral to a weight loss program; whatever works for you.     ########################    We will consider with your Rx for weight loss (supervised weight loss); but we need you  A. Price these Rx with your pharmacist Contrave, Belviq, Qysmia, Saxenda.  These are FDA approved for long term use and having been used for several years are still without significant safety concerns.   B. Have a weight loss plan that includes controlling diet/calories and involves regular exercise.   C. Agree to some form of supervision of your progress through occ office visits or weight reporting.    We would not be able to continue giving you any Rx for weight loss if it does not result in results so all of our suggestions are simply to help keep you on tract and allow you to safely continue an Rx we would write.         Follow up: Return for fasting lab when able.  / will see how testing/or treatment goes and decide;.  Future Appointments   Date Time Provider Department Center   10/15/2019  8:35 AM LAB [facility] None   12/17/2019  8:10 AM LAB [facility]  None   12/20/2019  9:30 AM Rajan Saxena MD [facility] None

## 2019-10-07 NOTE — PATIENT INSTRUCTIONS
Goals for your blood pressure are 130-140 range top and 80-90 range bottom and you feeling ok.     We really advise rechecking your blood pressure at home (or with a friend) daily to every other day for the next several days and let us know if your pattern is not the goals above.  If you will do this make sure you control variables that can make your blood pressure show higher than it really is:   A. Use a machine that measures your blood pressure at the upper arm level; not wrist or lower  B. Take off any shirts/garmets and apply the cuff to your bare arm  C. Be sure and rest your arm being used on a table/like so it is totally supported  D. Do not cross your legs while taking your blood pressure  E. Be calm, rested and not upset/anxious in any way while taking your blood pressure  F. Avoid talking while taking the blood pressure  G. Be sure your back is supported and not in a strain while taking your blood pressure.     If you cannot do this at home/with a friend OR want it done here we are happy to make appointments here for that (we only charge/bill for a nurse visit for doing this).      Please CALL US if your blood pressure stays up as that probably means you have a problem; we likely will adjust things even over the phone.  We want your blood pressure to be normal.     ########################    Your weight today is too high.  You should consider some degree of weight loss (consistant with your abilities and your other health issues)      The fundamentals for wanting to lose weight are:   1. You need a plan.  Your plan might/should likely not be the same as other people (it's what works for you that works)  2. You need to know how much you want to lose.  For health purposes 10% of your body weight is a good start and often a reasonable amount to lose AND keep off.   3. Your plan will need a start date (after the wedding, etc).    4. I advise a plan for what you want to lose each week and therefore you can  come close to knowing how long it will take for you to reach your goal.  5. With the above; then you need to decide how many calories you may eat each day.  Using a phone umang like [employer], or [employer] will calculate that for you.  We will be happy to do this if you need our help.  We do not advise fasting; calories below 1000 jackie a day for anyone.  Stick to your daily calories by controlling sweets, snacks, portion size, and binge eating.   6. You will want to decide how much exercise you can, want or like to do to make this weight loss happen.  Exercise is always important for a weight loss program.   Be sure however and pick one that will not interere with your other health problems.  We would be happy to give you suggestions.  The most effective exercise is something you enjoy as you need to do this regularily.   7. Consider an accountability aide whether it be the phone umang, a friend joining you on this endeavor, a membership to a gym, a referral to a weight loss program; whatever works for you.     ########################    We will consider with your Rx for weight loss (supervised weight loss); but we need you  A. Price these Rx with your pharmacist Contrave, Belviq, Qysmia, Saxenda.  These are FDA approved for long term use and having been used for several years are still without significant safety concerns.   B. Have a weight loss plan that includes controlling diet/calories and involves regular exercise.   C. Agree to some form of supervision of your progress through occ office visits or weight reporting.    We would not be able to continue giving you any Rx for weight loss if it does not result in results so all of our suggestions are simply to help keep you on tract and allow you to safely continue an Rx we would write.

## 2019-10-09 ENCOUNTER — TELEPHONE (OUTPATIENT)
Dept: FAMILY MEDICINE CLINIC | Facility: CLINIC | Age: 44
End: 2019-10-09

## 2019-10-09 NOTE — TELEPHONE ENCOUNTER
Pt called to see if he needed to still take the HCTZ. He took the Losartan today and his B/P was 114/88, but he was very lightheaded with it.  He is going to take another Losartan, but wasn't sure if he needed the HCTZ right now?

## 2019-10-10 ENCOUNTER — TELEPHONE (OUTPATIENT)
Dept: FAMILY MEDICINE CLINIC | Facility: CLINIC | Age: 44
End: 2019-10-10

## 2019-10-10 NOTE — TELEPHONE ENCOUNTER
----- Message from Gavi Thomas sent at 10/10/2019  9:48 AM CDT -----  Pt called asking for you to call him in regards to the scan he has been waiting to get. Says he has not received a call yet from you or hospital.

## 2019-10-10 NOTE — TELEPHONE ENCOUNTER
The xray order is the only one I see in Epic so if there's something else, I don't have it.  I always give copies of xray orders to patient at checkout and tell them that they can get at any imaging place without an appointment. If he wants at [facility] they will have a copy of the order. If he wants elsewhere, then I can fax it if necessary (but again, he should have had a copy). Xray orders don't come to my workques because the patient gets a copy when leaving and goes where then want, no appointment necessary. That's why I print it or get it off the nurse printer.

## 2019-10-11 ENCOUNTER — HOSPITAL ENCOUNTER (OUTPATIENT)
Dept: GENERAL RADIOLOGY | Facility: HOSPITAL | Age: 44
Discharge: HOME | End: 2019-10-11
Admitting: FAMILY MEDICINE

## 2019-10-11 DIAGNOSIS — M25.569 KNEE PAIN, UNSPECIFIED CHRONICITY, UNSPECIFIED LATERALITY: ICD-10-CM

## 2019-10-11 PROCEDURE — 73560 X-RAY EXAM OF KNEE 1 OR 2: CPT

## 2019-10-14 ENCOUNTER — TELEPHONE (OUTPATIENT)
Dept: FAMILY MEDICINE CLINIC | Facility: CLINIC | Age: 44
End: 2019-10-14

## 2019-10-14 DIAGNOSIS — R73.01 ELEVATED FASTING GLUCOSE: Chronic | ICD-10-CM

## 2019-10-14 DIAGNOSIS — E78.5 HYPERLIPIDEMIA, UNSPECIFIED HYPERLIPIDEMIA TYPE: Chronic | ICD-10-CM

## 2019-10-14 DIAGNOSIS — Z87.442 HISTORY OF KIDNEY STONES: ICD-10-CM

## 2019-10-14 DIAGNOSIS — M17.11 PRIMARY OSTEOARTHRITIS OF RIGHT KNEE: Primary | ICD-10-CM

## 2019-10-14 DIAGNOSIS — Z00.00 WELLNESS EXAMINATION: ICD-10-CM

## 2019-10-14 DIAGNOSIS — E66.01 CLASS 3 SEVERE OBESITY DUE TO EXCESS CALORIES WITH BODY MASS INDEX (BMI) OF 40.0 TO 44.9 IN ADULT, UNSPECIFIED WHETHER SERIOUS COMORBIDITY PRESENT (HCC): ICD-10-CM

## 2019-10-14 DIAGNOSIS — I10 ESSENTIAL HYPERTENSION: Primary | Chronic | ICD-10-CM

## 2019-10-15 ENCOUNTER — RESULTS ENCOUNTER (OUTPATIENT)
Dept: FAMILY MEDICINE CLINIC | Facility: CLINIC | Age: 44
End: 2019-10-15

## 2019-10-15 DIAGNOSIS — R73.01 ELEVATED FASTING GLUCOSE: Chronic | ICD-10-CM

## 2019-10-15 DIAGNOSIS — I10 ESSENTIAL HYPERTENSION: Chronic | ICD-10-CM

## 2019-10-16 DIAGNOSIS — E11.21 CONTROLLED TYPE 2 DIABETES MELLITUS WITH DIABETIC NEPHROPATHY, UNSPECIFIED WHETHER LONG TERM INSULIN USE (HCC): ICD-10-CM

## 2019-10-16 DIAGNOSIS — R73.01 ELEVATED FASTING GLUCOSE: Primary | ICD-10-CM

## 2019-10-16 PROBLEM — E11.9 DIABETES TYPE 2, CONTROLLED: Status: ACTIVE | Noted: 2019-10-16

## 2019-10-16 LAB
BUN SERPL-MCNC: 13 MG/DL (ref 6–20)
BUN/CREAT SERPL: 14.6 (ref 7–25)
CALCIUM SERPL-MCNC: 8.5 MG/DL (ref 8.6–10.5)
CHLORIDE SERPL-SCNC: 101 MMOL/L (ref 98–107)
CO2 SERPL-SCNC: 25.3 MMOL/L (ref 22–29)
CREAT SERPL-MCNC: 0.89 MG/DL (ref 0.76–1.27)
GLUCOSE SERPL-MCNC: 144 MG/DL (ref 65–99)
HBA1C MFR BLD: 7.2 % (ref 4.8–5.6)
POTASSIUM SERPL-SCNC: 4.3 MMOL/L (ref 3.5–5.2)
SODIUM SERPL-SCNC: 139 MMOL/L (ref 136–145)

## 2019-10-16 RX ORDER — METFORMIN HYDROCHLORIDE 500 MG/1
TABLET, EXTENDED RELEASE ORAL
Qty: 60 TABLET | Refills: 2 | Status: SHIPPED | OUTPATIENT
Start: 2019-10-16 | End: 2020-01-01

## 2019-10-16 RX ORDER — BLOOD-GLUCOSE METER
1 KIT MISCELLANEOUS
Qty: 1 EACH | Refills: 0 | Status: SHIPPED | OUTPATIENT
Start: 2019-10-16

## 2019-12-13 DIAGNOSIS — I10 ESSENTIAL HYPERTENSION: ICD-10-CM

## 2019-12-13 DIAGNOSIS — R73.01 ELEVATED FASTING GLUCOSE: Primary | ICD-10-CM

## 2019-12-13 DIAGNOSIS — E78.5 HYPERLIPIDEMIA, UNSPECIFIED HYPERLIPIDEMIA TYPE: ICD-10-CM

## 2019-12-13 DIAGNOSIS — E79.0 HYPERURICEMIA: ICD-10-CM

## 2019-12-13 DIAGNOSIS — E11.21 CONTROLLED TYPE 2 DIABETES MELLITUS WITH DIABETIC NEPHROPATHY, UNSPECIFIED WHETHER LONG TERM INSULIN USE (HCC): ICD-10-CM

## 2019-12-16 ENCOUNTER — RESULTS ENCOUNTER (OUTPATIENT)
Dept: FAMILY MEDICINE CLINIC | Facility: CLINIC | Age: 44
End: 2019-12-16

## 2019-12-16 DIAGNOSIS — E11.21 CONTROLLED TYPE 2 DIABETES MELLITUS WITH DIABETIC NEPHROPATHY, UNSPECIFIED WHETHER LONG TERM INSULIN USE (HCC): ICD-10-CM

## 2019-12-16 DIAGNOSIS — E78.5 HYPERLIPIDEMIA, UNSPECIFIED HYPERLIPIDEMIA TYPE: ICD-10-CM

## 2019-12-16 DIAGNOSIS — I10 ESSENTIAL HYPERTENSION: ICD-10-CM

## 2019-12-16 DIAGNOSIS — E79.0 HYPERURICEMIA: ICD-10-CM

## 2019-12-31 ENCOUNTER — OFFICE VISIT (OUTPATIENT)
Dept: FAMILY MEDICINE CLINIC | Facility: CLINIC | Age: 44
End: 2019-12-31

## 2019-12-31 VITALS
HEIGHT: 74 IN | SYSTOLIC BLOOD PRESSURE: 152 MMHG | RESPIRATION RATE: 20 BRPM | WEIGHT: 315 LBS | HEART RATE: 76 BPM | TEMPERATURE: 98.7 F | BODY MASS INDEX: 40.43 KG/M2 | DIASTOLIC BLOOD PRESSURE: 110 MMHG | OXYGEN SATURATION: 97 %

## 2019-12-31 DIAGNOSIS — E11.21 CONTROLLED TYPE 2 DIABETES MELLITUS WITH DIABETIC NEPHROPATHY, UNSPECIFIED WHETHER LONG TERM INSULIN USE (HCC): Primary | ICD-10-CM

## 2019-12-31 DIAGNOSIS — K21.9 GASTROESOPHAGEAL REFLUX DISEASE, ESOPHAGITIS PRESENCE NOT SPECIFIED: Chronic | ICD-10-CM

## 2019-12-31 DIAGNOSIS — M25.569 KNEE PAIN, UNSPECIFIED CHRONICITY, UNSPECIFIED LATERALITY: ICD-10-CM

## 2019-12-31 DIAGNOSIS — I10 ESSENTIAL HYPERTENSION: Chronic | ICD-10-CM

## 2019-12-31 DIAGNOSIS — R03.0 ELEVATED BLOOD PRESSURE READING: ICD-10-CM

## 2019-12-31 DIAGNOSIS — E66.01 CLASS 3 SEVERE OBESITY DUE TO EXCESS CALORIES WITH BODY MASS INDEX (BMI) OF 40.0 TO 44.9 IN ADULT, UNSPECIFIED WHETHER SERIOUS COMORBIDITY PRESENT (HCC): ICD-10-CM

## 2019-12-31 PROCEDURE — 99214 OFFICE O/P EST MOD 30 MIN: CPT | Performed by: FAMILY MEDICINE

## 2020-01-10 RX ORDER — METFORMIN HYDROCHLORIDE 500 MG/1
TABLET, EXTENDED RELEASE ORAL
Qty: 180 TABLET | Refills: 0 | OUTPATIENT
Start: 2020-01-10

## 2020-02-12 ENCOUNTER — APPOINTMENT (OUTPATIENT)
Dept: NUTRITION | Facility: HOSPITAL | Age: 45
End: 2020-02-12

## 2020-02-17 DIAGNOSIS — I10 ESSENTIAL HYPERTENSION: ICD-10-CM

## 2020-02-17 RX ORDER — METOPROLOL SUCCINATE 50 MG/1
TABLET, EXTENDED RELEASE ORAL
Qty: 90 TABLET | Refills: 1 | Status: SHIPPED | OUTPATIENT
Start: 2020-02-17 | End: 2020-09-09

## 2020-03-19 RX ORDER — DICLOFENAC SODIUM 75 MG/1
TABLET, DELAYED RELEASE ORAL
Qty: 180 TABLET | Refills: 1 | Status: SHIPPED | OUTPATIENT
Start: 2020-03-19 | End: 2020-09-15 | Stop reason: SDUPTHER

## 2020-03-30 DIAGNOSIS — I10 ESSENTIAL HYPERTENSION: ICD-10-CM

## 2020-03-30 RX ORDER — LOSARTAN POTASSIUM 100 MG/1
100 TABLET ORAL DAILY
Qty: 90 TABLET | Refills: 0 | Status: SHIPPED | OUTPATIENT
Start: 2020-03-30 | End: 2020-05-08 | Stop reason: SDUPTHER

## 2020-03-30 NOTE — TELEPHONE ENCOUNTER
Spoke with pt and after calling both [facility] and [facility] he will go ahead and get the Losartan from [facility] until [facility] gets them in.

## 2020-05-08 DIAGNOSIS — I10 ESSENTIAL HYPERTENSION: ICD-10-CM

## 2020-05-08 RX ORDER — LOSARTAN POTASSIUM 100 MG/1
100 TABLET ORAL DAILY
Qty: 90 TABLET | Refills: 0 | Status: SHIPPED | OUTPATIENT
Start: 2020-05-08 | End: 2020-08-03

## 2020-05-08 NOTE — TELEPHONE ENCOUNTER
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 Patient is in need of refill so that he can take his meds as usual.     Please refill and advise.

## 2020-07-22 ENCOUNTER — OFFICE VISIT (OUTPATIENT)
Dept: FAMILY MEDICINE CLINIC | Facility: CLINIC | Age: 45
End: 2020-07-22

## 2020-07-22 VITALS
OXYGEN SATURATION: 97 % | WEIGHT: 315 LBS | TEMPERATURE: 98.7 F | SYSTOLIC BLOOD PRESSURE: 138 MMHG | RESPIRATION RATE: 16 BRPM | HEIGHT: 74 IN | HEART RATE: 98 BPM | DIASTOLIC BLOOD PRESSURE: 80 MMHG | BODY MASS INDEX: 40.43 KG/M2

## 2020-07-22 DIAGNOSIS — E11.21 CONTROLLED TYPE 2 DIABETES MELLITUS WITH DIABETIC NEPHROPATHY, UNSPECIFIED WHETHER LONG TERM INSULIN USE (HCC): ICD-10-CM

## 2020-07-22 DIAGNOSIS — E66.01 CLASS 3 SEVERE OBESITY DUE TO EXCESS CALORIES WITH BODY MASS INDEX (BMI) OF 40.0 TO 44.9 IN ADULT, UNSPECIFIED WHETHER SERIOUS COMORBIDITY PRESENT (HCC): ICD-10-CM

## 2020-07-22 DIAGNOSIS — I10 ESSENTIAL HYPERTENSION: Chronic | ICD-10-CM

## 2020-07-22 DIAGNOSIS — K21.9 GASTROESOPHAGEAL REFLUX DISEASE, ESOPHAGITIS PRESENCE NOT SPECIFIED: Chronic | ICD-10-CM

## 2020-07-22 DIAGNOSIS — M19.90 OSTEOARTHRITIS, UNSPECIFIED OSTEOARTHRITIS TYPE, UNSPECIFIED SITE: ICD-10-CM

## 2020-07-22 DIAGNOSIS — E78.5 HYPERLIPIDEMIA, UNSPECIFIED HYPERLIPIDEMIA TYPE: Primary | Chronic | ICD-10-CM

## 2020-07-22 PROCEDURE — 99396 PREV VISIT EST AGE 40-64: CPT | Performed by: FAMILY MEDICINE

## 2020-07-22 PROCEDURE — 99214 OFFICE O/P EST MOD 30 MIN: CPT | Performed by: FAMILY MEDICINE

## 2020-07-22 NOTE — PATIENT INSTRUCTIONS
A1C:  Lab Results - Last 18 Months   Lab Units 10/15/19  0733   HEMOGLOBIN A1C % 7.20*       Your A1c pattern/today is above for your review    A1c values:   <5.5 what we see on a normal/non-diabetic person  6.5 what it takes to be diagnosed with diabetes AND what most endocrinologist recommend if you are a diabetic  7.5 what the American diabetic association says you should be.      ########################

## 2020-07-23 LAB
ALBUMIN SERPL-MCNC: 4 G/DL (ref 3.5–5.2)
ALBUMIN/GLOB SERPL: 1.2 G/DL
ALP SERPL-CCNC: 98 U/L (ref 39–117)
ALT SERPL-CCNC: 51 U/L (ref 1–41)
AST SERPL-CCNC: 25 U/L (ref 1–40)
BASOPHILS # BLD AUTO: 0.1 10*3/MM3 (ref 0–0.2)
BASOPHILS NFR BLD AUTO: 1.3 % (ref 0–1.5)
BILIRUB SERPL-MCNC: 0.3 MG/DL (ref 0–1.2)
BUN SERPL-MCNC: 19 MG/DL (ref 6–20)
BUN/CREAT SERPL: 16.8 (ref 7–25)
CALCIUM SERPL-MCNC: 9 MG/DL (ref 8.6–10.5)
CHLORIDE SERPL-SCNC: 102 MMOL/L (ref 98–107)
CO2 SERPL-SCNC: 27 MMOL/L (ref 22–29)
CREAT SERPL-MCNC: 1.13 MG/DL (ref 0.76–1.27)
EOSINOPHIL # BLD AUTO: 0.07 10*3/MM3 (ref 0–0.4)
EOSINOPHIL NFR BLD AUTO: 0.9 % (ref 0.3–6.2)
ERYTHROCYTE [DISTWIDTH] IN BLOOD BY AUTOMATED COUNT: 13.3 % (ref 12.3–15.4)
GLOBULIN SER CALC-MCNC: 3.4 GM/DL
GLUCOSE SERPL-MCNC: 108 MG/DL (ref 65–99)
HBA1C MFR BLD: 7 % (ref 4.8–5.6)
HCT VFR BLD AUTO: 39.8 % (ref 37.5–51)
HGB BLD-MCNC: 12.8 G/DL (ref 13–17.7)
IMM GRANULOCYTES # BLD AUTO: 0.03 10*3/MM3 (ref 0–0.05)
IMM GRANULOCYTES NFR BLD AUTO: 0.4 % (ref 0–0.5)
LYMPHOCYTES # BLD AUTO: 1.8 10*3/MM3 (ref 0.7–3.1)
LYMPHOCYTES NFR BLD AUTO: 22.8 % (ref 19.6–45.3)
MCH RBC QN AUTO: 28.6 PG (ref 26.6–33)
MCHC RBC AUTO-ENTMCNC: 32.2 G/DL (ref 31.5–35.7)
MCV RBC AUTO: 89 FL (ref 79–97)
MONOCYTES # BLD AUTO: 0.77 10*3/MM3 (ref 0.1–0.9)
MONOCYTES NFR BLD AUTO: 9.7 % (ref 5–12)
NEUTROPHILS # BLD AUTO: 5.13 10*3/MM3 (ref 1.7–7)
NEUTROPHILS NFR BLD AUTO: 64.9 % (ref 42.7–76)
NRBC BLD AUTO-RTO: 0 /100 WBC (ref 0–0.2)
PLATELET # BLD AUTO: 287 10*3/MM3 (ref 140–450)
POTASSIUM SERPL-SCNC: 4.6 MMOL/L (ref 3.5–5.2)
PROT SERPL-MCNC: 7.4 G/DL (ref 6–8.5)
RBC # BLD AUTO: 4.47 10*6/MM3 (ref 4.14–5.8)
SODIUM SERPL-SCNC: 140 MMOL/L (ref 136–145)
TSH SERPL DL<=0.005 MIU/L-ACNC: 0.82 UIU/ML (ref 0.27–4.2)
WBC # BLD AUTO: 7.9 10*3/MM3 (ref 3.4–10.8)

## 2020-07-24 DIAGNOSIS — D50.8 OTHER IRON DEFICIENCY ANEMIA: Primary | ICD-10-CM

## 2020-07-27 ENCOUNTER — RESULTS ENCOUNTER (OUTPATIENT)
Dept: FAMILY MEDICINE CLINIC | Facility: CLINIC | Age: 45
End: 2020-07-27

## 2020-07-27 DIAGNOSIS — D50.8 OTHER IRON DEFICIENCY ANEMIA: ICD-10-CM

## 2020-07-29 ENCOUNTER — CLINICAL SUPPORT (OUTPATIENT)
Dept: FAMILY MEDICINE CLINIC | Facility: CLINIC | Age: 45
End: 2020-07-29

## 2020-07-29 DIAGNOSIS — D50.8 OTHER IRON DEFICIENCY ANEMIA: Primary | ICD-10-CM

## 2020-07-29 PROBLEM — D64.9 ANEMIA: Status: ACTIVE | Noted: 2020-07-29

## 2020-07-29 LAB
EXPIRATION DATE 2: ABNORMAL
EXPIRATION DATE 3: ABNORMAL
EXPIRATION DATE: ABNORMAL
GASTROCULT GAST QL: POSITIVE
HEMOCCULT SP2 STL QL: NEGATIVE
HEMOCCULT SP3 STL QL: POSITIVE
Lab: ABNORMAL

## 2020-07-29 PROCEDURE — 82274 ASSAY TEST FOR BLOOD FECAL: CPT | Performed by: FAMILY MEDICINE

## 2020-07-30 DIAGNOSIS — K92.1 BLOOD IN STOOL: ICD-10-CM

## 2020-07-30 DIAGNOSIS — D50.8 OTHER IRON DEFICIENCY ANEMIA: Primary | ICD-10-CM

## 2020-08-03 DIAGNOSIS — I10 ESSENTIAL HYPERTENSION: ICD-10-CM

## 2020-08-03 RX ORDER — LOSARTAN POTASSIUM 100 MG/1
TABLET ORAL
Qty: 90 TABLET | Refills: 2 | Status: SHIPPED | OUTPATIENT
Start: 2020-08-03 | End: 2021-04-27

## 2020-09-08 ENCOUNTER — TELEPHONE (OUTPATIENT)
Dept: FAMILY MEDICINE CLINIC | Facility: CLINIC | Age: 45
End: 2020-09-08

## 2020-09-08 DIAGNOSIS — E11.21 CONTROLLED TYPE 2 DIABETES MELLITUS WITH DIABETIC NEPHROPATHY, UNSPECIFIED WHETHER LONG TERM INSULIN USE (HCC): Primary | ICD-10-CM

## 2020-09-08 NOTE — TELEPHONE ENCOUNTER
"Patient called in requesting \"Trulicity 1.5MG\" be prescribed and sent to the [facility]/pharmacy #[id number] - [place], [place] - [address] AT ACROSS FROM [address]  [phone] [facility] [phone]  at the earliest convenience. He states he was provided 2 months worth of samples and was advised to contact the office once out to receive a prescription. Please advise.      "

## 2020-09-09 DIAGNOSIS — I10 ESSENTIAL HYPERTENSION: ICD-10-CM

## 2020-09-09 RX ORDER — METOPROLOL SUCCINATE 50 MG/1
TABLET, EXTENDED RELEASE ORAL
Qty: 90 TABLET | Refills: 1 | Status: SHIPPED | OUTPATIENT
Start: 2020-09-09 | End: 2021-02-02

## 2020-09-09 NOTE — TELEPHONE ENCOUNTER
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

## 2020-09-10 ENCOUNTER — OFFICE VISIT (OUTPATIENT)
Dept: GASTROENTEROLOGY | Facility: CLINIC | Age: 45
End: 2020-09-10

## 2020-09-10 VITALS
BODY MASS INDEX: 40.43 KG/M2 | TEMPERATURE: 98.4 F | DIASTOLIC BLOOD PRESSURE: 84 MMHG | HEIGHT: 74 IN | SYSTOLIC BLOOD PRESSURE: 136 MMHG | OXYGEN SATURATION: 98 % | HEART RATE: 84 BPM | WEIGHT: 315 LBS

## 2020-09-10 DIAGNOSIS — R19.5 HEME POSITIVE STOOL: ICD-10-CM

## 2020-09-10 DIAGNOSIS — K21.9 GASTROESOPHAGEAL REFLUX DISEASE, ESOPHAGITIS PRESENCE NOT SPECIFIED: ICD-10-CM

## 2020-09-10 DIAGNOSIS — D64.9 ANEMIA, UNSPECIFIED TYPE: Primary | ICD-10-CM

## 2020-09-10 PROCEDURE — 99204 OFFICE O/P NEW MOD 45 MIN: CPT | Performed by: NURSE PRACTITIONER

## 2020-09-10 RX ORDER — SODIUM, POTASSIUM,MAG SULFATES 17.5-3.13G
SOLUTION, RECONSTITUTED, ORAL ORAL
Qty: 1 BOTTLE | Refills: 0 | Status: ON HOLD | OUTPATIENT
Start: 2020-09-10 | End: 2020-10-23

## 2020-09-10 NOTE — PROGRESS NOTES
"Chief Complaint   Patient presents with   • Anemia     anemia per dr. saxena       PCP: Rajan Saxena MD  REFER: Rajan Saxena MD    Subjective     HPI    Hank Hanna found to be anemic on routine lab work in July 2020.  Follow up stool cards deemed heme positive 2 out of 3 cards.  This is a new finding.  deneis bright red blood per rectum, no melena.  No abdominal pain.  No weight loss.  No change in bowel.  Bowels described as solid, move 2 days per day.   He complain of increase fatigue.      Past Medical History:   Diagnosis Date   • GERD (gastroesophageal reflux disease)    • Hyperlipidemia    • Hypertension    • Obesity        Past Surgical History:   Procedure Laterality Date   • BACK SURGERY     • KNEE SURGERY Bilateral        Outpatient Medications Marked as Taking for the 9/10/20 encounter (Office Visit) with Grover Ledesma APRN   Medication Sig Dispense Refill   • diclofenac (VOLTAREN) 75 MG EC tablet TAKE 1 TABLET BY MOUTH TWICE A  tablet 1   • Dulaglutide 1.5 MG/0.5ML solution pen-injector Inject 1.5 mg under the skin into the appropriate area as directed 1 (One) Time Per Week. 2 mL 5   • losartan (COZAAR) 100 MG tablet TAKE ONE TABLET DAILY GENERIC FOR COZAAR 90 tablet 2   • metoprolol succinate XL (TOPROL-XL) 50 MG 24 hr tablet TAKE 1 TABLET BY MOUTH EVERY DAY 90 tablet 1   • Multiple Vitamins-Minerals (MULTI-VITAMIN GUMMIES) chewable tablet Chew 2 (Two) Times a Day.     • Omeprazole Magnesium (PRILOSEC OTC PO) Take 20 mg by mouth Daily.     • tadalafil (CIALIS) 20 MG tablet Take 20 mg by mouth Daily As Needed for erectile dysfunction.         Allergies   Allergen Reactions   • Lisinopril Cough     \"made me cough\"   • Norvasc [Amlodipine] Other (See Comments)     Gum issue       Social History     Socioeconomic History   • Marital status: Single     Spouse name: Not on file   • Number of children: 0   • Years of education: 12   • Highest education level: Not on file " "  Occupational History   • Occupation:  [employer]   Tobacco Use   • Smoking status: Former Smoker     Packs/day: 0.75     Types: Cigarettes     Start date: 1/10/1989     Last attempt to quit: 2012     Years since quittin.7   • Smokeless tobacco: Former User     Types: Chew     Quit date: 1990   Substance and Sexual Activity   • Alcohol use: Yes     Comment: 3 times weekly   • Drug use: No   • Sexual activity: Not Currently     Partners: Female     Birth control/protection: None       Family History   Problem Relation Age of Onset   • Lung cancer Paternal Grandfather    • Liver cancer Paternal Grandfather    • Colon cancer Neg Hx    • Colon polyps Neg Hx    • Esophageal cancer Neg Hx        Review of Systems   Constitutional: Negative for fatigue, fever and unexpected weight change.   HENT: Negative for hearing loss, sore throat and voice change.    Eyes: Negative for visual disturbance.   Respiratory: Negative for cough, shortness of breath and wheezing.    Cardiovascular: Negative for chest pain and palpitations.   Gastrointestinal: Positive for blood in stool. Negative for abdominal pain and vomiting.   Endocrine: Negative for polydipsia and polyuria.   Genitourinary: Negative for difficulty urinating, dysuria, hematuria and urgency.   Musculoskeletal: Negative for joint swelling and myalgias.   Skin: Negative for color change, rash and wound.   Neurological: Negative for dizziness, tremors, seizures and syncope.   Hematological: Does not bruise/bleed easily.   Psychiatric/Behavioral: Negative for agitation and confusion. The patient is not nervous/anxious.        Objective     Vitals:    09/10/20 1242   BP: 136/84   Pulse: 84   Temp: 98.4 °F (36.9 °C)   SpO2: 98%   Weight: (!) 158 kg (349 lb)   Height: 188 cm (74\")     Body mass index is 44.81 kg/m².    Physical Exam   Constitutional: He is oriented to person, place, and time. He appears well-developed and well-nourished. He is cooperative.   HENT: "   Head: Normocephalic and atraumatic.   Eyes: Pupils are equal, round, and reactive to light. Conjunctivae are normal. No scleral icterus.   Neck: Normal range of motion. Neck supple. No JVD present. No thyroid mass and no thyromegaly present.   Cardiovascular: Normal rate, regular rhythm and normal heart sounds. Exam reveals no gallop and no friction rub.   No murmur heard.  Pulmonary/Chest: Effort normal and breath sounds normal. No accessory muscle usage. No respiratory distress. He has no wheezes. He has no rales.   Abdominal: Soft. Normal appearance and bowel sounds are normal. He exhibits no distension, no ascites and no mass. There is no hepatosplenomegaly. There is no tenderness. There is no rebound and no guarding.   Musculoskeletal: Normal range of motion. He exhibits no edema or tenderness.     Vascular Status -  His right foot exhibits normal foot vasculature  and no edema. His left foot exhibits normal foot vasculature  and no edema.  Lymphadenopathy:     He has no cervical adenopathy.   Neurological: He is alert and oriented to person, place, and time. He has normal strength. Gait normal.   Skin: Skin is warm, dry and intact. No rash noted.       Imaging Results (Most Recent)     None          Body mass index is 44.81 kg/m².    Assessment/Plan     Hank was seen today for anemia.    Diagnoses and all orders for this visit:    Anemia, unspecified type    Heme positive stool  -     Case Request; Standing  -     Case Request    Gastroesophageal reflux disease, esophagitis presence not specified  -     Case Request; Standing  -     Case Request        COLONOSCOPY WITH ANESTHESIA (N/A), ESOPHAGOGASTRODUODENOSCOPY WITH ANESTHESIA (N/A)    Patient is to continue all blood pressure and cardiac medications prior to procedure and has been advised to take medications morning of procedure   Pt verbalized understanding    Advised pt to stop ASA, use of NSAIDs, Fish Oil, and MV 5 days prior to procedure, per   Manjit protocol.  Tylenol based products are ok to take.  Pt verbalized understanding.    All risks, benefits, alternatives, and indications of colonoscopy procedure have been discussed with the patient. Risks to include perforation of the colon requiring possible surgery or colostomy, risk of bleeding from biopsies or removal of colon tissue, possibility of missing a colon polyp or cancer, or adverse drug reaction.  Benefits to include the diagnosis and management of disease of the colon and rectum. Alternatives to include barium enema, radiographic evaluation, lab testing or no intervention. Pt verbalizes understanding and agrees to proceed with procedure.    Precautions are currently being put in place due to COVID-19.  I have explained to Hank Hanna they will be required to undergo COVID testing prior to their procedure.  Hank Hanna verbalized understanding and was willing to proceed.    Patient's Body mass index is 44.81 kg/m². BMI is above normal parameters. Recommendations include: no follow up.       Grover Ledesma, APRN  09/10/20          There are no Patient Instructions on file for this visit.

## 2020-09-11 PROBLEM — R19.5 HEME POSITIVE STOOL: Status: ACTIVE | Noted: 2020-09-11

## 2020-09-15 RX ORDER — DICLOFENAC SODIUM 75 MG/1
75 TABLET, DELAYED RELEASE ORAL 2 TIMES DAILY
Qty: 180 TABLET | Refills: 1 | Status: SHIPPED | OUTPATIENT
Start: 2020-09-15 | End: 2021-07-15

## 2020-10-16 ENCOUNTER — TRANSCRIBE ORDERS (OUTPATIENT)
Dept: ADMINISTRATIVE | Facility: HOSPITAL | Age: 45
End: 2020-10-16

## 2020-10-16 DIAGNOSIS — Z01.818 PRE-OP TESTING: Primary | ICD-10-CM

## 2020-10-20 ENCOUNTER — LAB (OUTPATIENT)
Dept: LAB | Facility: HOSPITAL | Age: 45
End: 2020-10-20

## 2020-10-20 PROCEDURE — C9803 HOPD COVID-19 SPEC COLLECT: HCPCS | Performed by: INTERNAL MEDICINE

## 2020-10-20 PROCEDURE — U0003 INFECTIOUS AGENT DETECTION BY NUCLEIC ACID (DNA OR RNA); SEVERE ACUTE RESPIRATORY SYNDROME CORONAVIRUS 2 (SARS-COV-2) (CORONAVIRUS DISEASE [COVID-19]), AMPLIFIED PROBE TECHNIQUE, MAKING USE OF HIGH THROUGHPUT TECHNOLOGIES AS DESCRIBED BY CMS-2020-01-R: HCPCS | Performed by: INTERNAL MEDICINE

## 2020-10-21 LAB
COVID LABCORP PRIORITY: NORMAL
SARS-COV-2 RNA RESP QL NAA+PROBE: NOT DETECTED

## 2020-10-23 ENCOUNTER — ANESTHESIA EVENT (OUTPATIENT)
Dept: GASTROENTEROLOGY | Facility: HOSPITAL | Age: 45
End: 2020-10-23

## 2020-10-23 ENCOUNTER — ANESTHESIA (OUTPATIENT)
Dept: GASTROENTEROLOGY | Facility: HOSPITAL | Age: 45
End: 2020-10-23

## 2020-10-23 ENCOUNTER — HOSPITAL ENCOUNTER (OUTPATIENT)
Facility: HOSPITAL | Age: 45
Setting detail: HOSPITAL OUTPATIENT SURGERY
Discharge: HOME | End: 2020-10-23
Attending: INTERNAL MEDICINE | Admitting: INTERNAL MEDICINE

## 2020-10-23 ENCOUNTER — TELEPHONE (OUTPATIENT)
Dept: GASTROENTEROLOGY | Facility: CLINIC | Age: 45
End: 2020-10-23

## 2020-10-23 ENCOUNTER — PREP FOR SURGERY (OUTPATIENT)
Dept: OTHER | Facility: HOSPITAL | Age: 45
End: 2020-10-23

## 2020-10-23 VITALS
SYSTOLIC BLOOD PRESSURE: 117 MMHG | RESPIRATION RATE: 15 BRPM | HEART RATE: 98 BPM | BODY MASS INDEX: 40.43 KG/M2 | WEIGHT: 315 LBS | OXYGEN SATURATION: 96 % | DIASTOLIC BLOOD PRESSURE: 75 MMHG | HEIGHT: 74 IN | TEMPERATURE: 97.2 F

## 2020-10-23 DIAGNOSIS — R19.5 HEME POSITIVE STOOL: Primary | ICD-10-CM

## 2020-10-23 DIAGNOSIS — K21.9 GASTROESOPHAGEAL REFLUX DISEASE, ESOPHAGITIS PRESENCE NOT SPECIFIED: ICD-10-CM

## 2020-10-23 DIAGNOSIS — R19.5 HEME POSITIVE STOOL: ICD-10-CM

## 2020-10-23 DIAGNOSIS — K21.9 GASTROESOPHAGEAL REFLUX DISEASE: ICD-10-CM

## 2020-10-23 PROCEDURE — 43239 EGD BIOPSY SINGLE/MULTIPLE: CPT | Performed by: INTERNAL MEDICINE

## 2020-10-23 PROCEDURE — 87081 CULTURE SCREEN ONLY: CPT | Performed by: INTERNAL MEDICINE

## 2020-10-23 PROCEDURE — 25010000002 PROPOFOL 10 MG/ML EMULSION: Performed by: NURSE ANESTHETIST, CERTIFIED REGISTERED

## 2020-10-23 PROCEDURE — 88305 TISSUE EXAM BY PATHOLOGIST: CPT | Performed by: INTERNAL MEDICINE

## 2020-10-23 PROCEDURE — 45385 COLONOSCOPY W/LESION REMOVAL: CPT | Performed by: INTERNAL MEDICINE

## 2020-10-23 RX ORDER — SODIUM CHLORIDE 9 MG/ML
100 INJECTION, SOLUTION INTRAVENOUS CONTINUOUS
Status: CANCELLED | OUTPATIENT
Start: 2020-10-23

## 2020-10-23 RX ORDER — SODIUM CHLORIDE 9 MG/ML
500 INJECTION, SOLUTION INTRAVENOUS CONTINUOUS PRN
Status: DISCONTINUED | OUTPATIENT
Start: 2020-10-23 | End: 2020-10-23 | Stop reason: HOSPADM

## 2020-10-23 RX ORDER — LIDOCAINE HYDROCHLORIDE 10 MG/ML
0.5 INJECTION, SOLUTION EPIDURAL; INFILTRATION; INTRACAUDAL; PERINEURAL ONCE AS NEEDED
Status: DISCONTINUED | OUTPATIENT
Start: 2020-10-23 | End: 2020-10-23 | Stop reason: HOSPADM

## 2020-10-23 RX ORDER — SODIUM CHLORIDE 0.9 % (FLUSH) 0.9 %
10 SYRINGE (ML) INJECTION AS NEEDED
Status: CANCELLED | OUTPATIENT
Start: 2020-10-23

## 2020-10-23 RX ORDER — SODIUM CHLORIDE 0.9 % (FLUSH) 0.9 %
10 SYRINGE (ML) INJECTION AS NEEDED
Status: DISCONTINUED | OUTPATIENT
Start: 2020-10-23 | End: 2020-10-23 | Stop reason: HOSPADM

## 2020-10-23 RX ORDER — PROPOFOL 10 MG/ML
VIAL (ML) INTRAVENOUS AS NEEDED
Status: DISCONTINUED | OUTPATIENT
Start: 2020-10-23 | End: 2020-10-23 | Stop reason: SURG

## 2020-10-23 RX ORDER — SODIUM CHLORIDE 0.9 % (FLUSH) 0.9 %
10 SYRINGE (ML) INJECTION EVERY 12 HOURS SCHEDULED
Status: CANCELLED | OUTPATIENT
Start: 2020-10-23

## 2020-10-23 RX ADMIN — SODIUM CHLORIDE 500 ML: 9 INJECTION, SOLUTION INTRAVENOUS at 09:58

## 2020-10-23 RX ADMIN — PROPOFOL 200 MG: 10 INJECTION, EMULSION INTRAVENOUS at 10:12

## 2020-10-23 RX ADMIN — PROPOFOL 200 MG: 10 INJECTION, EMULSION INTRAVENOUS at 10:04

## 2020-10-23 RX ADMIN — PROPOFOL 200 MG: 10 INJECTION, EMULSION INTRAVENOUS at 10:08

## 2020-10-23 RX ADMIN — PROPOFOL 100 MG: 10 INJECTION, EMULSION INTRAVENOUS at 10:19

## 2020-10-23 NOTE — TELEPHONE ENCOUNTER
Colonoscopy - Negative   EGD - Negative     Sent over scheduled M2A     10/27/2020 at 7am     Need case request and prep sent to pharmacy    Thank you       Can u help assist?  ty

## 2020-10-23 NOTE — ANESTHESIA POSTPROCEDURE EVALUATION
Patient: Hank Hanna    Procedure Summary     Date: 10/23/20 Room / Location:  [facility] / [facility]    Anesthesia Start: 1001 Anesthesia Stop: 1024    Procedures:       COLONOSCOPY WITH ANESTHESIA (N/A )      ESOPHAGOGASTRODUODENOSCOPY WITH ANESTHESIA (N/A ) Diagnosis:       Heme positive stool      Gastroesophageal reflux disease, esophagitis presence not specified      (Heme positive stool [R19.5])      (Gastroesophageal reflux disease, esophagitis presence not specified [K21.9])    Surgeon: Shakeel Saravia DO Provider: Daniel Lunsford CRNA    Anesthesia Type: MAC ASA Status: 2          Anesthesia Type: MAC    Vitals  Vitals Value Taken Time   BP     Temp     Pulse 111 10/23/20 1024   Resp     SpO2 95 % 10/23/20 1024   Vitals shown include unvalidated device data.        Post Anesthesia Care and Evaluation    Patient location during evaluation: PACU  Patient participation: complete - patient participated  Level of consciousness: awake and awake and alert  Pain score: 0  Pain management: adequate  Airway patency: patent  Anesthetic complications: No anesthetic complications    Cardiovascular status: acceptable and stable  Respiratory status: acceptable and unassisted  Hydration status: acceptable

## 2020-10-23 NOTE — H&P
[facility]  Pre Procedure History & Physical    Chief Complaint:   Anemia    Subjective     HPI:   Anemia    Past Medical History:   Past Medical History:   Diagnosis Date   • GERD (gastroesophageal reflux disease)    • Hyperlipidemia    • Hypertension    • Obesity        Past Surgical History:  Past Surgical History:   Procedure Laterality Date   • BACK SURGERY     • KNEE SURGERY Bilateral        Family History:  Family History   Problem Relation Age of Onset   • Lung cancer Paternal Grandfather    • Liver cancer Paternal Grandfather    • Colon cancer Neg Hx    • Colon polyps Neg Hx    • Esophageal cancer Neg Hx        Social History:   reports that he quit smoking about 7 years ago. His smoking use included cigarettes. He started smoking about 31 years ago. He smoked 0.75 packs per day. He quit smokeless tobacco use about 30 years ago.  His smokeless tobacco use included chew. He reports current alcohol use. He reports that he does not use drugs.    Medications:   Prior to Admission medications    Medication Sig Start Date End Date Taking? Authorizing Provider   diclofenac (VOLTAREN) 75 MG EC tablet Take 1 tablet by mouth 2 (Two) Times a Day. 9/15/20  Yes Rajan Saxena MD   losartan (COZAAR) 100 MG tablet TAKE ONE TABLET DAILY GENERIC FOR COZAAR 8/3/20  Yes Rajan Saxena MD   metoprolol succinate XL (TOPROL-XL) 50 MG 24 hr tablet TAKE 1 TABLET BY MOUTH EVERY DAY 9/9/20  Yes Rajan Saxena MD   Omeprazole Magnesium (PRILOSEC OTC PO) Take 20 mg by mouth Daily.   Yes ProviderJohnny MD   Blood Glucose Monitoring Suppl (B-D LOGIC BLOOD GLUCOSE) w/Device kit 1 each Every Morning Before Breakfast. And once randomly during the day. 10/16/19   Rajan Saxena MD   Dulaglutide 1.5 MG/0.5ML solution pen-injector Inject 1.5 mg under the skin into the appropriate area as directed 1 (One) Time Per Week. 9/8/20   Rajan Saxena MD   glucose blood test strip Use as  "instructed once prior to breakfast and then once randomly during the day. 10/16/19   Rajan Saxena MD   Multiple Vitamins-Minerals (MULTI-VITAMIN GUMMIES) chewable tablet Chew 2 (Two) Times a Day.    ProviderJohnny MD   LIONHOPE DELICA LANCETS FINE misc Use 1 prior to breakfast and then 1 randomly during the day. 10/16/19   Rajan Saxena MD   tadalafil (CIALIS) 20 MG tablet Take 20 mg by mouth Daily As Needed for erectile dysfunction.    Provider, MD Johnny   hydroCHLOROthiazide (HYDRODIURIL) 12.5 MG tablet Take 1 tablet by mouth Daily. 10/7/19 10/23/20  Rajan Saxena MD   sodium-potassium-magnesium sulfates (Suprep Bowel Prep Kit) 17.5-3.13-1.6 GM/177ML solution oral solution Take as directed 9/10/20 10/23/20  Grover Ledesma, CHLOE       Allergies:  Lisinopril and Norvasc [amlodipine]    ROS:    General: Weight stable  Resp: No SOA  Cardiovascular: No CP    Objective     Blood pressure (!) 148/102, pulse 105, temperature 97.2 °F (36.2 °C), temperature source Temporal, resp. rate 18, height 188 cm (74\"), weight (!) 155 kg (342 lb), SpO2 95 %.    Physical Exam   Constitutional: Pt is oriented to person, place, and in no distress.   HENT: Mouth/Throat: Oropharynx is clear.   Cardiovascular: Normal rate, regular rhythm.    Pulmonary/Chest: Effort normal. No respiratory distress. No  wheezes.   Abdominal: Soft. Non-distended.  Skin: Skin is warm and dry.   Psychiatric: Mood, memory, affect and judgment appear normal.     Assessment/Plan     Diagnosis:  Anemia    Anticipated Surgical Procedure:  E/C    The risks, benefits, and alternatives of this procedure have been discussed with the patient or the responsible party- the patient understands and agrees to proceed.        "

## 2020-10-23 NOTE — ANESTHESIA PREPROCEDURE EVALUATION
Anesthesia Evaluation     history of anesthetic complications:  NPO Solid Status: > 8 hours  NPO Liquid Status: > 4 hours           Airway   Mallampati: II  TM distance: >3 FB  Neck ROM: full  No difficulty expected  Dental - normal exam     Pulmonary    (+) a smoker Former,   (-) sleep apnea  Cardiovascular   Exercise tolerance: good (4-7 METS)    Patient on routine beta blocker and Beta blocker given within 24 hours of surgery  Rhythm: regular  Rate: normal    (+) hypertension well controlled, hyperlipidemia,       Neuro/Psych- negative ROS  GI/Hepatic/Renal/Endo    (+) obesity, morbid obesity, GERD poorly controlled,  renal disease stones, diabetes mellitus type 2 well controlled,     Musculoskeletal     Abdominal    Substance History      OB/GYN          Other                        Anesthesia Plan    ASA 2     MAC     intravenous induction     Anesthetic plan, all risks, benefits, and alternatives have been provided, discussed and informed consent has been obtained with: patient.

## 2020-10-24 LAB — UREASE TISS QL: NEGATIVE

## 2020-10-26 ENCOUNTER — TELEPHONE (OUTPATIENT)
Dept: GASTROENTEROLOGY | Facility: CLINIC | Age: 45
End: 2020-10-26

## 2020-10-26 LAB
CYTO UR: NORMAL
LAB AP CASE REPORT: NORMAL
PATH REPORT.FINAL DX SPEC: NORMAL
PATH REPORT.GROSS SPEC: NORMAL

## 2020-10-27 ENCOUNTER — HOSPITAL ENCOUNTER (OUTPATIENT)
Facility: HOSPITAL | Age: 45
Setting detail: HOSPITAL OUTPATIENT SURGERY
Discharge: HOME | End: 2020-10-27
Attending: INTERNAL MEDICINE | Admitting: INTERNAL MEDICINE

## 2020-10-27 ENCOUNTER — TELEPHONE (OUTPATIENT)
Dept: GASTROENTEROLOGY | Facility: CLINIC | Age: 45
End: 2020-10-27

## 2020-10-27 PROCEDURE — C1889 IMPLANT/INSERT DEVICE, NOC: HCPCS | Performed by: INTERNAL MEDICINE

## 2020-10-27 PROCEDURE — 91110 GI TRC IMG INTRAL ESOPH-ILE: CPT | Performed by: INTERNAL MEDICINE

## 2020-10-27 PROCEDURE — 0: Performed by: INTERNAL MEDICINE

## 2020-10-27 NOTE — TELEPHONE ENCOUNTER
----- Message from Shakeel Saravia DO sent at 10/26/2020 12:38 PM CDT -----  Please let patient know small bowel biopsy was negative     Thank you      ----- Message -----  From: Lab, Background User  Sent: 10/24/2020  12:25 PM CDT  To: Shakeel Saravia DO

## 2020-10-29 ENCOUNTER — TELEPHONE (OUTPATIENT)
Dept: GASTROENTEROLOGY | Facility: CLINIC | Age: 45
End: 2020-10-29

## 2020-10-29 NOTE — TELEPHONE ENCOUNTER
Dr. Saravia spoke with patient - Gave M2A results - Normal but not complete. Offer a CTE. We discuss the fact that the patient is not bleeding, no weight loss and having no issues at this time.   Patient agreed to speak with Rajan Saxena MD  And / or call us back if he would like for us to order the CTE.     Thank you

## 2020-12-21 ENCOUNTER — TELEPHONE (OUTPATIENT)
Dept: FAMILY MEDICINE CLINIC | Facility: CLINIC | Age: 45
End: 2020-12-21

## 2020-12-21 NOTE — TELEPHONE ENCOUNTER
PATIENT CALLING IN REQUESTING A CALL BACK. PATIENT HAS BEEN EXPOSED TO SOMEONE WHO TESTED POSITIVE FOR COVID-19. PATIENT STATES HE IS HAVING NO SYMPTOMS. PATIENT IS WANTING TO KNOW IF HE SHOULD GO GET TESTED OR WHAT HE SHOULD DO.      CALL BACK NUMBER: [phone]

## 2020-12-22 NOTE — TELEPHONE ENCOUNTER
If no symptoms, telehealth is not necessary.  He needs to wait till at least day 6 before getting tested.  Okay to give him an outpatient order but it needs to be at least 6 days since exposure.

## 2021-01-26 DIAGNOSIS — E11.21 CONTROLLED TYPE 2 DIABETES MELLITUS WITH DIABETIC NEPHROPATHY, UNSPECIFIED WHETHER LONG TERM INSULIN USE (HCC): ICD-10-CM

## 2021-01-26 RX ORDER — DULAGLUTIDE 1.5 MG/.5ML
INJECTION, SOLUTION SUBCUTANEOUS
Qty: 6 PEN | Refills: 1 | Status: SHIPPED | OUTPATIENT
Start: 2021-01-26 | End: 2021-07-15

## 2021-02-02 ENCOUNTER — OFFICE VISIT (OUTPATIENT)
Dept: FAMILY MEDICINE CLINIC | Facility: CLINIC | Age: 46
End: 2021-02-02

## 2021-02-02 VITALS
TEMPERATURE: 98 F | OXYGEN SATURATION: 97 % | WEIGHT: 315 LBS | HEIGHT: 74 IN | RESPIRATION RATE: 16 BRPM | HEART RATE: 85 BPM | BODY MASS INDEX: 40.43 KG/M2 | DIASTOLIC BLOOD PRESSURE: 92 MMHG | SYSTOLIC BLOOD PRESSURE: 152 MMHG

## 2021-02-02 DIAGNOSIS — E11.21 CONTROLLED TYPE 2 DIABETES MELLITUS WITH DIABETIC NEPHROPATHY, UNSPECIFIED WHETHER LONG TERM INSULIN USE (HCC): ICD-10-CM

## 2021-02-02 DIAGNOSIS — I10 ESSENTIAL HYPERTENSION: ICD-10-CM

## 2021-02-02 DIAGNOSIS — D64.9 ANEMIA, UNSPECIFIED TYPE: ICD-10-CM

## 2021-02-02 DIAGNOSIS — M19.90 OSTEOARTHRITIS, UNSPECIFIED OSTEOARTHRITIS TYPE, UNSPECIFIED SITE: ICD-10-CM

## 2021-02-02 DIAGNOSIS — R03.0 ELEVATED BLOOD PRESSURE READING: ICD-10-CM

## 2021-02-02 PROCEDURE — 99214 OFFICE O/P EST MOD 30 MIN: CPT | Performed by: FAMILY MEDICINE

## 2021-02-02 RX ORDER — METOPROLOL SUCCINATE 50 MG/1
100 TABLET, EXTENDED RELEASE ORAL DAILY
Qty: 90 TABLET | Refills: 1
Start: 2021-02-02 | End: 2021-02-09

## 2021-02-02 NOTE — PROGRESS NOTES
Subjective   Hank Hanna is a 46 y.o. male presenting with chief complaint of:   Chief Complaint   Patient presents with   • Diabetes   • Hypertension       History of Present Illness :  Alone.       Has multiple chronic problems to consider that might have a bearing on today's issues;  an interval appointment.       Chronic/acute problems reviewed today:   1. Controlled type 2 diabetes mellitus with diabetic nephropathy, unspecified whether long term insulin use (CMS/[facility]) Chronic/stable.  No problem/pattern hypoglycemia/hyperglycemia manifest by poly- dypsia, phagia, uria, or sweats, diaphoretic episodes, syncope/near.     2. Anemia, unspecified type Chronic or past history/stable recently: This has been present before.    There has been GI evaulation in the past. There is no current melena, hematochezia. It has been benign to date and stable/watching.  Contributing comorbidities to date: ? NSAID use.  Had unremarkable EGD/colon and the camera malfunctioned and missed a small part of his small bowel.  GI told him to simply follow his hemoglobin and iron     3. Osteoarthritis, unspecified osteoarthritis type, unspecified site Chronic/stable.  Various on/off joint pains/soreness/stiffness.  Particular joint problems with knees.  No joint swelling.  Treats mainly with reduced activity, Rx listed, Tylenol. Active NSAIDs, and no injections.      4. Essential hypertension Chronic/unstable. Above targets today/without home blood pressures.  No significant chest pain, SOB, LE edema, orthopnea, near syncope, dizziness/light headness.  Agrees to follow next few days and let us know if stays elevated.   Recent Vitals       10/23/2020 10/23/2020 2/2/2021       BP:  115/82  117/75  152/92     Pulse:  106  98  85     Temp:  --  --  98 °F (36.7 °C)     Weight:  --  --  (!) 159 kg (350 lb)     BMI (Calculated):  --  --  44.9            5. Elevated blood pressure reading see BP     Has an/another acute issue today: none.    The  "following portions of the patient's history were reviewed and updated as appropriate: allergies, current medications, past family history, past medical history, past social history, past surgical history and problem list.  Especially CA-colon/none     Current Outpatient Medications:   •  Blood Glucose Monitoring Suppl (B-D LOGIC BLOOD GLUCOSE) w/Device kit, 1 each Every Morning Before Breakfast. And once randomly during the day., Disp: 1 each, Rfl: 0  •  diclofenac (VOLTAREN) 75 MG EC tablet, Take 1 tablet by mouth 2 (Two) Times a Day., Disp: 180 tablet, Rfl: 1  •  glucose blood test strip, Use as instructed once prior to breakfast and then once randomly during the day., Disp: 100 each, Rfl: 2  •  losartan (COZAAR) 100 MG tablet, TAKE ONE TABLET DAILY GENERIC FOR COZAAR, Disp: 90 tablet, Rfl: 2  •  metoprolol succinate XL (TOPROL-XL) 50 MG 24 hr tablet, TAKE 1 TABLET BY MOUTH EVERY DAY, Disp: 90 tablet, Rfl: 1  •  Multiple Vitamins-Minerals (MULTI-VITAMIN GUMMIES) chewable tablet, Chew 2 (Two) Times a Day., Disp: , Rfl:   •  Omeprazole Magnesium (PRILOSEC OTC PO), Take 20 mg by mouth Daily., Disp: , Rfl:   •  ONETOUCH DELICA LANCETS FINE misc, Use 1 prior to breakfast and then 1 randomly during the day., Disp: 100 each, Rfl: 2  •  tadalafil (CIALIS) 20 MG tablet, Take 20 mg by mouth Daily As Needed for erectile dysfunction., Disp: , Rfl:   •  Trulicity 1.5 MG/0.5ML solution pen-injector, INJECT 1.5 MG UNDER THE SKIN INTO THE APPROPRIATE AREA AS DIRECTED 1 (ONE) TIME PER WEEK., Disp: 6 pen, Rfl: 1    No problems with medications.    Allergies   Allergen Reactions   • Lisinopril Cough     \"made me cough\"   • Norvasc [Amlodipine] Other (See Comments)     Gum issue       Review of Systems  GENERAL:  Active/slower with limits, speed, samni for age. Sleeps ok. No fever.  ENDO:  No syncope, near or diaphoretic sweaty spells.    HEENT: No head injury or headache.   No vision change; besides glasses..  No hearing " loss/pain/drainage.  No sore throat.  No nasal/sinus congestion/drainage. No epistaxis.  CHEST: No chest wall tenderness or mass. No cough, wheeze, SOB or hemoptysis.  CV: No chest pain, palpatations, ankle edema.  GI: No heartburn, dysphagia, abdominal pain, diarrhea, constipation, rectal bleeding, or melena.  :  Voids without dysuria, or incontience to completion.  ORTHO: No painful/swollen joints but various on /off sore especially knees; better R after arthroscopy.  No sore neck or back.  No acute neck or back pain without recent injury.   NEURO: No dizziness, weakness of extremities.  No numbness/parethesias.   PSYCH: No memory loss.  Mood good; not anxious, depressed or suicidal.  Tolerated stress.      Screening:  Mammogram: NA  Bone density: NA  Low dose CT chest: Tobacco-smoker/age 14/<1 ppd/dc 12.1.2013 (22)-NA  GI:   EGD-neg/LIZZY/Manjit/10.23.20/prn  Colon-p/Manjit/LIZZY/10.23.20/3y  M2-missed small part/10.2020  Prostate: NA  Usual lab order  6m BMP, A1c  12m CBC, CMP, A1c, LIPID    Data reviewed:   Last cardiac testing:     Lab Results:  Results for orders placed or performed during the hospital encounter of 10/23/20   Urease For H Pylori - Tissue, Stomach    Specimen: Stomach; Tissue   Result Value Ref Range    Urease Negative Negative   Tissue Pathology Exam    Specimen: A: Small Intestine; Tissue    B: Large Intestine; Polyp   Result Value Ref Range    Case Report       Surgical Pathology Report                         Case: [id number]                                  Authorizing Provider:  Shakeel Saravia DO      Collected:           10/23/2020 10:10 AM          Ordering Location:     [facility]     Received:            10/23/2020 12:48 PM                                 ENDOSCOPY                                                                    Pathologist:           Julien Zhong MD                                                            Specimens:   1) - Small Intestine, small  bowel bx                                                                2) - Large Intestine, rectal                                                               Final Diagnosis       Small bowel, biopsy-no specific histopathology    Rectum, biopsy-tubular adenoma      Gross Description       Specimen 1A is received in formalin labeled with the patient's name date of birth and designated as small bowel biopsy.  Specimen consists of a single tan piece of tissue measuring 0.4 x 0.2 x 0.2 cm.  The specimen is entirely submitted in a single cassette.       Specimen 2A  is received in formalin labeled with the patient's name date of birth and designated as rectal.  Specimen consists of a single red-tan polyp measuring 0.6 x 0.6 x 0.4 cm with attached stalk measuring 0.5 0.2 x 0.2 cm, specimen is bisected.  The specimen is entirely submitted in a single cassette.         Microscopic Description       Microscopic examination was performed         A1C:  Lab Results - Last 18 Months   Lab Units 07/22/20  1251 10/15/19  0733   HEMOGLOBIN A1C % 7.00* 7.20*     LIPID:No results for input(s): CHLPL, LDL, HDL, TRIG in the last 08487 hours.  PSA:No results for input(s): PSA in the last 69374 hours.  CBC:  Lab Results - Last 18 Months   Lab Units 07/22/20  1251   WBC 10*3/mm3 7.90   HEMOGLOBIN g/dL 12.8*   HEMATOCRIT % 39.8   PLATELETS 10*3/mm3 287      BMP/CMP:  Lab Results - Last 18 Months   Lab Units 07/22/20  1251 10/15/19  0733   SODIUM mmol/L 140 139   POTASSIUM mmol/L 4.6 4.3   CHLORIDE mmol/L 102 101   TOTAL CO2 mmol/L 27.0 25.3   GLUCOSE mg/dL 108* 144*   BUN mg/dL 19 13   CREATININE mg/dL 1.13 0.89   EGFR IF NONAFRICN AM mL/min/1.73 70 92   EGFR IF AFRICN AM mL/min/1.73 84 112   CALCIUM mg/dL 9.0 8.5*     HEPATIC:  Lab Results - Last 18 Months   Lab Units 07/22/20  1251   ALT (SGPT) U/L 51*   AST (SGOT) U/L 25   ALK PHOS U/L 98     THYROID:  Lab Results - Last 18 Months   Lab Units 07/22/20  1251   TSH uIU/mL 0.817  "      Objective   /92 (BP Location: Left arm)   Pulse 85   Temp 98 °F (36.7 °C)   Resp 16   Ht 188 cm (74\")   Wt (!) 159 kg (350 lb)   SpO2 97%   BMI 44.94 kg/m²   Body mass index is 44.94 kg/m².    Recent Vitals       10/23/2020 10/23/2020 2/2/2021       BP:  115/82  117/75  152/92     Pulse:  106  98  85     Temp:  --  --  98 °F (36.7 °C)     Weight:  --  --  (!) 159 kg (350 lb)     BMI (Calculated):  --  --  44.9           Physical Exam  GENERAL:  Well nourished/developed  in no acute distress. Obese.   SKIN: Turgor excellent, without wound, rash, lesion.   HEENT: Normal cephalic without trauma.  Pupils equal round reactive to light. Extraocular motions full without nystagmus.   .  No thyroidmegaly, mass, tenderness, lymphadenopathy and supple.   CV: Regular rhythm.  No murmur, gallop, edema. Posterior pulses intact.  No carotid bruits.   CHEST: No chest wall tenderness or mass.   LUNGS: Symmetric motion with clear to auscultation.  No dullness to percussion.   ABD: Soft, nontender without mass.   ORTHO: Symmetric extremities without swelling/point tenderness.  Full gross range of motion.    NEURO: CN 2-12 grossly intact.  Symmetric facies. 2/4 x 4 biceps, knees equal reflexes.  UE/LE   4-5/5 strength throughout.  Nonfocal use extremities. Speech clear. Intact light touch with monofilament, vibratory sensation with tuning fork; equal toes/distal feet.    PSYCH: Oriented x 3.  Pleasant calm, well kept.  Purposeful/directed conservation with intact short/long gross memory.      Assessment/Plan     1. Controlled type 2 diabetes mellitus with diabetic nephropathy, unspecified whether long term insulin use (CMS/[facility])    2. Anemia, unspecified type    3. Osteoarthritis, unspecified osteoarthritis type, unspecified site    4. Essential hypertension    5. Elevated blood pressure reading      Discussion:   Seems GI safe to follow his labs and iron  Allowing him to continue use of Voltaren as his knees would " not be doing well without it  Increase his Toprol today and watch home blood pressures  GLP-1's versus other meds were discussed    Medical decision issues:   Data review above:   Rx: reviewed and decisions:     New Medications Ordered This Visit   Medications   • metoprolol succinate XL (TOPROL-XL) 50 MG 24 hr tablet     Sig: Take 2 tablets by mouth Daily.     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  Patient's Body mass index is 44.94 kg/m². BMI is above normal parameters. Recommendations include: exercise counseling, nutrition counseling and hoping he can/will lose some.    Tobacco use reviewed:    Hank Hanna  reports that he quit smoking about 8 years ago. His smoking use included cigarettes. He started smoking about 32 years ago. He smoked 0.75 packs per day. He quit smokeless tobacco use about 30 years ago.  His smokeless tobacco use included chew..    Patient Instructions   Your blood pressure was higher than what we really wanted for you today at 152/92.  It is often a problem for blood pressures in the doctor's office to be higher than home (called white coat syndrome).   Goals for your blood pressure are 130-140 range top and 80-90 range bottom and you feeling ok.     We really advise rechecking your blood pressure at home (or with a friend) daily to every other day for the next several days and let us know if your pattern is not the goals above.  If you will do this make sure you  control variables that can make your blood pressure show higher than it really is:   A. Use a machine that measures your blood pressure at the upper arm level; not wrist or lower  B. Take off any shirts/garmets and apply the cuff to your bare arm  C. Be sure and rest your arm being used on a table/like so it is totally supported  D. Do not cross your legs while taking your blood pressure  E. Be calm, rested and not upset/anxious in any way while taking your blood pressure  F. Avoid talking while taking the blood pressure  G. Be sure your back is supported and not in a strain while taking your blood pressure.     If you cannot do this at home/with a friend OR want it done here we are happy to make appointments here for that (we only charge/bill for a nurse visit for doing this).      Please CALL US if your blood pressure stays up as that probably means you have a problem; we likely will adjust things even over the phone.  We want your blood pressure to be normal.     OMRON is a reliable/common home blood automatic blood pressure device that can range around 50-75$.     ########################          Follow up: Return for lab today/, THEN, lab;, Dr Saxena-, 6 m;.  Future Appointments   Date Time Provider Department Center   8/10/2021 10:30 AM Rajan Saxena MD [facility]

## 2021-02-02 NOTE — PATIENT INSTRUCTIONS
Your blood pressure was higher than what we really wanted for you today at 152/92.  It is often a problem for blood pressures in the doctor's office to be higher than home (called white coat syndrome).   Goals for your blood pressure are 130-140 range top and 80-90 range bottom and you feeling ok.     We really advise rechecking your blood pressure at home (or with a friend) daily to every other day for the next several days and let us know if your pattern is not the goals above.  If you will do this make sure you control variables that can make your blood pressure show higher than it really is:   A. Use a machine that measures your blood pressure at the upper arm level; not wrist or lower  B. Take off any shirts/garmets and apply the cuff to your bare arm  C. Be sure and rest your arm being used on a table/like so it is totally supported  D. Do not cross your legs while taking your blood pressure  E. Be calm, rested and not upset/anxious in any way while taking your blood pressure  F. Avoid talking while taking the blood pressure  G. Be sure your back is supported and not in a strain while taking your blood pressure.     If you cannot do this at home/with a friend OR want it done here we are happy to make appointments here for that (we only charge/bill for a nurse visit for doing this).      Please CALL US if your blood pressure stays up as that probably means you have a problem; we likely will adjust things even over the phone.  We want your blood pressure to be normal.     OMRON is a reliable/common home blood automatic blood pressure device that can range around 50-75$.     ########################

## 2021-02-03 LAB
ALBUMIN SERPL-MCNC: 4.3 G/DL (ref 3.5–5.2)
ALBUMIN/GLOB SERPL: 1.4 G/DL
ALP SERPL-CCNC: 92 U/L (ref 39–117)
ALT SERPL-CCNC: 33 U/L (ref 1–41)
AST SERPL-CCNC: 22 U/L (ref 1–40)
BASOPHILS # BLD AUTO: 0.07 10*3/MM3 (ref 0–0.2)
BASOPHILS NFR BLD AUTO: 1 % (ref 0–1.5)
BILIRUB SERPL-MCNC: 0.6 MG/DL (ref 0–1.2)
BUN SERPL-MCNC: 13 MG/DL (ref 6–20)
BUN/CREAT SERPL: 12.5 (ref 7–25)
CALCIUM SERPL-MCNC: 9.3 MG/DL (ref 8.6–10.5)
CHLORIDE SERPL-SCNC: 100 MMOL/L (ref 98–107)
CHOLEST SERPL-MCNC: 252 MG/DL (ref 0–200)
CO2 SERPL-SCNC: 27.7 MMOL/L (ref 22–29)
CREAT SERPL-MCNC: 1.04 MG/DL (ref 0.76–1.27)
EOSINOPHIL # BLD AUTO: 0.09 10*3/MM3 (ref 0–0.4)
EOSINOPHIL NFR BLD AUTO: 1.3 % (ref 0.3–6.2)
ERYTHROCYTE [DISTWIDTH] IN BLOOD BY AUTOMATED COUNT: 13.2 % (ref 12.3–15.4)
GLOBULIN SER CALC-MCNC: 3 GM/DL
GLUCOSE SERPL-MCNC: 90 MG/DL (ref 65–99)
HBA1C MFR BLD: 6.2 % (ref 4.8–5.6)
HCT VFR BLD AUTO: 40.7 % (ref 37.5–51)
HDLC SERPL-MCNC: 44 MG/DL (ref 40–60)
HGB BLD-MCNC: 13.3 G/DL (ref 13–17.7)
IMM GRANULOCYTES # BLD AUTO: 0.05 10*3/MM3 (ref 0–0.05)
IMM GRANULOCYTES NFR BLD AUTO: 0.7 % (ref 0–0.5)
IRON SATN MFR SERPL: 16 % (ref 20–50)
IRON SERPL-MCNC: 68 MCG/DL (ref 59–158)
LDLC SERPL CALC-MCNC: 186 MG/DL (ref 0–100)
LDLC/HDLC SERPL: 4.18 {RATIO}
LYMPHOCYTES # BLD AUTO: 1.8 10*3/MM3 (ref 0.7–3.1)
LYMPHOCYTES NFR BLD AUTO: 25.3 % (ref 19.6–45.3)
MCH RBC QN AUTO: 27.5 PG (ref 26.6–33)
MCHC RBC AUTO-ENTMCNC: 32.7 G/DL (ref 31.5–35.7)
MCV RBC AUTO: 84.1 FL (ref 79–97)
MONOCYTES # BLD AUTO: 0.54 10*3/MM3 (ref 0.1–0.9)
MONOCYTES NFR BLD AUTO: 7.6 % (ref 5–12)
NEUTROPHILS # BLD AUTO: 4.56 10*3/MM3 (ref 1.7–7)
NEUTROPHILS NFR BLD AUTO: 64.1 % (ref 42.7–76)
NRBC BLD AUTO-RTO: 0 /100 WBC (ref 0–0.2)
PLATELET # BLD AUTO: 264 10*3/MM3 (ref 140–450)
POTASSIUM SERPL-SCNC: 4.3 MMOL/L (ref 3.5–5.2)
PROT SERPL-MCNC: 7.3 G/DL (ref 6–8.5)
RBC # BLD AUTO: 4.84 10*6/MM3 (ref 4.14–5.8)
SODIUM SERPL-SCNC: 137 MMOL/L (ref 136–145)
TIBC SERPL-MCNC: 417 MCG/DL
TRIGL SERPL-MCNC: 121 MG/DL (ref 0–150)
TSH SERPL DL<=0.005 MIU/L-ACNC: 0.41 UIU/ML (ref 0.27–4.2)
UIBC SERPL-MCNC: 349 MCG/DL (ref 112–346)
VLDLC SERPL CALC-MCNC: 22 MG/DL (ref 5–40)
WBC # BLD AUTO: 7.11 10*3/MM3 (ref 3.4–10.8)

## 2021-02-04 DIAGNOSIS — E11.21 CONTROLLED TYPE 2 DIABETES MELLITUS WITH DIABETIC NEPHROPATHY, UNSPECIFIED WHETHER LONG TERM INSULIN USE (HCC): ICD-10-CM

## 2021-02-04 DIAGNOSIS — E78.5 HYPERLIPIDEMIA, UNSPECIFIED HYPERLIPIDEMIA TYPE: Primary | Chronic | ICD-10-CM

## 2021-02-04 DIAGNOSIS — D64.9 ANEMIA, UNSPECIFIED TYPE: ICD-10-CM

## 2021-02-04 RX ORDER — ATORVASTATIN CALCIUM 20 MG/1
20 TABLET, FILM COATED ORAL NIGHTLY
Qty: 30 TABLET | Refills: 5 | Status: SHIPPED | OUTPATIENT
Start: 2021-02-04 | End: 2021-07-15

## 2021-02-04 NOTE — TELEPHONE ENCOUNTER
Requested Prescriptions     Pending Prescriptions Disp Refills   • atorvastatin (LIPITOR) 20 MG tablet 30 tablet 5     Sig: Take 1 tablet by mouth Every Night.     PT agrees to try statin.

## 2021-02-08 DIAGNOSIS — I10 ESSENTIAL HYPERTENSION: ICD-10-CM

## 2021-02-08 DIAGNOSIS — R03.0 ELEVATED BLOOD PRESSURE READING: ICD-10-CM

## 2021-02-09 RX ORDER — METOPROLOL SUCCINATE 50 MG/1
TABLET, EXTENDED RELEASE ORAL
Qty: 90 TABLET | Refills: 1 | Status: SHIPPED | OUTPATIENT
Start: 2021-02-09 | End: 2021-06-10

## 2021-02-09 NOTE — TELEPHONE ENCOUNTER
Requested Prescriptions     Pending Prescriptions Disp Refills   • metoprolol succinate XL (TOPROL-XL) 50 MG 24 hr tablet [Pharmacy Med Name: METOPROLOL SUCC ER 50 MG TAB] 90 tablet 1     Sig: TAKE 1 TABLET BY MOUTH EVERY DAY

## 2021-02-12 ENCOUNTER — TELEPHONE (OUTPATIENT)
Dept: FAMILY MEDICINE CLINIC | Facility: CLINIC | Age: 46
End: 2021-02-12

## 2021-02-12 NOTE — TELEPHONE ENCOUNTER
PATIENT CALLED AND STATED THAT HE WAS STARTED ON NEW MEDICATION. EMPLOYER WANTS HIM TO BE ON IT FOR TWO WEEKS. PATIENT WANTS A RETURN TO WORK NOTICE FROM DR. ROQUE.    PLEASE FAX TO: [phone] ATTN: MEDICAL RECORDS FOR [facility].     CALLBACK: [phone]

## 2021-04-27 DIAGNOSIS — I10 ESSENTIAL HYPERTENSION: ICD-10-CM

## 2021-04-27 RX ORDER — LOSARTAN POTASSIUM 100 MG/1
TABLET ORAL
Qty: 90 TABLET | Refills: 2 | Status: SHIPPED | OUTPATIENT
Start: 2021-04-27 | End: 2021-08-31

## 2021-06-09 ENCOUNTER — HOSPITAL ENCOUNTER (OUTPATIENT)
Dept: NUCLEAR MEDICINE | Age: 46
Discharge: HOME | End: 2021-06-11
Payer: COMMERCIAL

## 2021-06-09 DIAGNOSIS — Z00.00 ENCOUNTER FOR ROUTINE ADULT HEALTH EXAMINATION WITHOUT ABNORMAL FINDINGS: ICD-10-CM

## 2021-06-09 LAB
LV EF: 58 %
LVEF MODALITY: NORMAL

## 2021-06-09 PROCEDURE — 93017 CV STRESS TEST TRACING ONLY: CPT

## 2021-06-09 PROCEDURE — 3430000000 HC RX DIAGNOSTIC RADIOPHARMACEUTICAL: Performed by: FAMILY MEDICINE

## 2021-06-09 PROCEDURE — 6360000002 HC RX W HCPCS: Performed by: FAMILY MEDICINE

## 2021-06-09 PROCEDURE — A9500 TC99M SESTAMIBI: HCPCS | Performed by: FAMILY MEDICINE

## 2021-06-09 RX ADMIN — TETRAKIS(2-METHOXYISOBUTYLISOCYANIDE)COPPER(I) TETRAFLUOROBORATE 30 MILLICURIE: 1 INJECTION, POWDER, LYOPHILIZED, FOR SOLUTION INTRAVENOUS at 11:26

## 2021-06-09 RX ADMIN — REGADENOSON 0.4 MG: 0.08 INJECTION, SOLUTION INTRAVENOUS at 13:18

## 2021-06-09 RX ADMIN — TETRAKIS(2-METHOXYISOBUTYLISOCYANIDE)COPPER(I) TETRAFLUOROBORATE 10 MILLICURIE: 1 INJECTION, POWDER, LYOPHILIZED, FOR SOLUTION INTRAVENOUS at 11:25

## 2021-06-10 DIAGNOSIS — R03.0 ELEVATED BLOOD PRESSURE READING: ICD-10-CM

## 2021-06-10 DIAGNOSIS — I10 ESSENTIAL HYPERTENSION: ICD-10-CM

## 2021-06-10 RX ORDER — METOPROLOL SUCCINATE 50 MG/1
TABLET, EXTENDED RELEASE ORAL
Qty: 90 TABLET | Refills: 1 | Status: SHIPPED | OUTPATIENT
Start: 2021-06-10 | End: 2021-07-20 | Stop reason: DRUGHIGH

## 2021-07-15 DIAGNOSIS — E11.21 CONTROLLED TYPE 2 DIABETES MELLITUS WITH DIABETIC NEPHROPATHY, UNSPECIFIED WHETHER LONG TERM INSULIN USE (HCC): ICD-10-CM

## 2021-07-15 RX ORDER — DULAGLUTIDE 1.5 MG/.5ML
INJECTION, SOLUTION SUBCUTANEOUS
Qty: 6 PEN | Refills: 1 | Status: SHIPPED | OUTPATIENT
Start: 2021-07-15 | End: 2021-12-15

## 2021-07-15 RX ORDER — DICLOFENAC SODIUM 75 MG/1
TABLET, DELAYED RELEASE ORAL
Qty: 180 TABLET | Refills: 1 | Status: SHIPPED | OUTPATIENT
Start: 2021-07-15 | End: 2022-01-11

## 2021-07-15 RX ORDER — ATORVASTATIN CALCIUM 20 MG/1
TABLET, FILM COATED ORAL
Qty: 90 TABLET | Refills: 1 | Status: SHIPPED | OUTPATIENT
Start: 2021-07-15 | End: 2022-01-11

## 2021-07-20 DIAGNOSIS — I10 ESSENTIAL HYPERTENSION: Primary | ICD-10-CM

## 2021-07-20 RX ORDER — METOPROLOL SUCCINATE 100 MG/1
100 TABLET, EXTENDED RELEASE ORAL DAILY
Qty: 90 TABLET | Refills: 1 | Status: SHIPPED | OUTPATIENT
Start: 2021-07-20 | End: 2022-01-14

## 2021-07-20 NOTE — TELEPHONE ENCOUNTER
Pt was increased to 100 mg daily on 2-2-21 from 50 mg daily to take two at a time, new rx done for 100 mg daily

## 2021-07-20 NOTE — TELEPHONE ENCOUNTER
Caller: Hank Hanna    Relationship to patient: Self    Best call back number: [phone]    Patient states the quantity or the dosage is wrong on his Metoprolol. He is running out before the month is up. Please advise.

## 2021-08-30 DIAGNOSIS — I10 ESSENTIAL HYPERTENSION: ICD-10-CM

## 2021-08-31 RX ORDER — LOSARTAN POTASSIUM 100 MG/1
TABLET ORAL
Qty: 90 TABLET | Refills: 3 | Status: SHIPPED | OUTPATIENT
Start: 2021-08-31 | End: 2022-08-09

## 2021-08-31 NOTE — TELEPHONE ENCOUNTER
Rx Refill Note  Requested Prescriptions     Pending Prescriptions Disp Refills   • losartan (COZAAR) 100 MG tablet [Pharmacy Med Name: LOSARTAN POTASSIUM 100 MG TAB] 90 tablet 3     Sig: TAKE 1 TABLET BY MOUTH EVERY DAY      Last office visit with prescribing clinician: 2/2/2021      Next office visit with prescribing clinician: Visit date not found            Kim Martinez MA  08/31/21, 12:47 CDT

## 2021-12-15 DIAGNOSIS — E11.21 CONTROLLED TYPE 2 DIABETES MELLITUS WITH DIABETIC NEPHROPATHY, UNSPECIFIED WHETHER LONG TERM INSULIN USE (HCC): ICD-10-CM

## 2021-12-15 RX ORDER — DULAGLUTIDE 1.5 MG/.5ML
INJECTION, SOLUTION SUBCUTANEOUS
Qty: 6 PEN | Refills: 1 | Status: SHIPPED | OUTPATIENT
Start: 2021-12-15 | End: 2022-05-20

## 2022-01-05 ENCOUNTER — OFFICE VISIT (OUTPATIENT)
Dept: FAMILY MEDICINE CLINIC | Facility: CLINIC | Age: 47
End: 2022-01-05

## 2022-01-05 VITALS
BODY MASS INDEX: 44.94 KG/M2 | HEIGHT: 74 IN | TEMPERATURE: 97.1 F | OXYGEN SATURATION: 95 % | HEART RATE: 98 BPM | RESPIRATION RATE: 16 BRPM

## 2022-01-05 DIAGNOSIS — U09.9 POST-COVID CHRONIC FATIGUE: ICD-10-CM

## 2022-01-05 DIAGNOSIS — U09.9 POST-COVID CHRONIC COUGH: Primary | ICD-10-CM

## 2022-01-05 DIAGNOSIS — R05.3 POST-COVID CHRONIC COUGH: Primary | ICD-10-CM

## 2022-01-05 DIAGNOSIS — G93.32 POST-COVID CHRONIC FATIGUE: ICD-10-CM

## 2022-01-05 PROCEDURE — 99213 OFFICE O/P EST LOW 20 MIN: CPT | Performed by: NURSE PRACTITIONER

## 2022-01-05 RX ORDER — DEXAMETHASONE 4 MG/1
TABLET ORAL
Qty: 9 TABLET | Refills: 0 | Status: SHIPPED | OUTPATIENT
Start: 2022-01-05 | End: 2022-01-11

## 2022-01-05 RX ORDER — ALBUTEROL SULFATE 90 UG/1
2 AEROSOL, METERED RESPIRATORY (INHALATION) 4 TIMES DAILY PRN
Qty: 8 G | Refills: 0 | Status: SHIPPED | OUTPATIENT
Start: 2022-01-05 | End: 2022-01-24

## 2022-01-06 NOTE — PROGRESS NOTES
"Subjective   Chief Complaint:  Evaluation of post Covid symptoms    History of Present Illness:  This 46 y.o. male was seen in the office today in the drive through.  Reports has been experiencing fatigue, difficulty getting air in and out, intolerance to activity with an acute onset starting approximately 10 months ago and progressed to chronic.  Reports chronic cough since having covid last year.    Denies having any loss of taste or smell, any acute Covid symptoms..    Denies recent exposure to active Covid-19 cases.          Allergies   Allergen Reactions   • Lisinopril Cough     \"made me cough\"   • Norvasc [Amlodipine] Other (See Comments)     Gum issue      Current Outpatient Medications on File Prior to Visit   Medication Sig   • atorvastatin (LIPITOR) 20 MG tablet TAKE 1 TABLET BY MOUTH EVERY DAY AT NIGHT   • Blood Glucose Monitoring Suppl (Language Cloud-D LOGIC BLOOD GLUCOSE) w/Device kit 1 each Every Morning Before Breakfast. And once randomly during the day.   • diclofenac (VOLTAREN) 75 MG EC tablet TAKE 1 TABLET BY MOUTH TWICE A DAY   • glucose blood test strip Use as instructed once prior to breakfast and then once randomly during the day.   • losartan (COZAAR) 100 MG tablet TAKE 1 TABLET BY MOUTH EVERY DAY   • metoprolol succinate XL (Toprol XL) 100 MG 24 hr tablet Take 1 tablet by mouth Daily.   • Multiple Vitamins-Minerals (MULTI-VITAMIN GUMMIES) chewable tablet Chew 2 (Two) Times a Day.   • Omeprazole Magnesium (PRILOSEC OTC PO) Take 20 mg by mouth Daily.   • ONETOUCH DELICA LANCETS FINE misc Use 1 prior to breakfast and then 1 randomly during the day.   • tadalafil (CIALIS) 20 MG tablet Take 20 mg by mouth Daily As Needed for erectile dysfunction.   • Trulicity 1.5 MG/0.5ML solution pen-injector INJECT 1.5 MG INTO THE SKIN ONCE WEEKLY     No current facility-administered medications on file prior to visit.      Past Medical, Surgical, Social, and Family History:  Past Medical History:   Diagnosis Date   • GERD " (gastroesophageal reflux disease)    • Hyperlipidemia    • Hypertension    • Obesity      Past Surgical History:   Procedure Laterality Date   • BACK SURGERY     • CAPSULE ENDOSCOPY N/A 10/27/2020    Procedure: CAPSULE ENDOSCOPY M2A;  Surgeon: Shakeel Saravia DO;  Location:  [facility] END[facility]COPY;  Service: Gastroenterology;  Laterality: N/A;   • COLONOSCOPY N/A 10/23/2020    Procedure: COLONOSCOPY WITH ANESTHESIA;  Surgeon: Shakeel Saravia DO;  Location:  [facility] ENDOSCOPY;  Service: Gastroenterology;  Laterality: N/A;  pre op: heme pos stool  postop:polyps  PCP: Rajan Saxena MD   • ENDOSCOPY N/A 10/23/2020    Procedure: ESOPHAGOGASTRODUODENOSCOPY WITH ANESTHESIA;  Surgeon: Shakeel Saravia DO;  Location: [facility] ENDOSCOPY;  Service: Gastroenterology;  Laterality: N/A;  pre op: anemia  post op;normal  PCP: Rajan Saxena MD   • KNEE SURGERY Bilateral      Social History     Socioeconomic History   • Marital status: Single   • Number of children: 0   • Years of education: 12   Tobacco Use   • Smoking status: Former Smoker     Packs/day: 0.75     Types: Cigarettes     Start date: 1/10/1989     Quit date: 2012     Years since quittin.1   • Smokeless tobacco: Former User     Types: Chew     Quit date: 1990   Vaping Use   • Vaping Use: Former   Substance and Sexual Activity   • Alcohol use: Yes     Comment: 3 times weekly   • Drug use: No   • Sexual activity: Not Currently     Partners: Female     Birth control/protection: None     Family History   Problem Relation Age of Onset   • Lung cancer Paternal Grandfather    • Liver cancer Paternal Grandfather    • Colon cancer Neg Hx    • Colon polyps Neg Hx    • Esophageal cancer Neg Hx      Objective   Covid Precautions Maintained  Physical Exam  Constitutional:       General: He is not in acute distress.     Appearance: Normal appearance. He is not ill-appearing, toxic-appearing or diaphoretic.   Pulmonary:      Effort: Pulmonary effort is  "normal. No respiratory distress.   Neurological:      Mental Status: He is alert.     Pulse 98   Temp 97.1 °F (36.2 °C)   Resp 16   Ht 188 cm (74\")   SpO2 95%   BMI 44.94 kg/m²     Assessment/Plan   Diagnoses and all orders for this visit:    1. Post-COVID chronic cough (Primary)  -     CT Chest With Contrast; Future  -     dexamethasone (DECADRON) 4 MG tablet; Take 1 tablet by mouth 2 (Two) Times a Day for 3 days, THEN 1 tablet Daily for 3 days.  Dispense: 9 tablet; Refill: 0  -     albuterol sulfate  (90 Base) MCG/ACT inhaler; Inhale 2 puffs 4 (Four) Times a Day As Needed for Wheezing.  Dispense: 8 g; Refill: 0    2. Post-COVID chronic fatigue    Discussion:  Advised and educated plan of care.  Post Covid syndrome program brochure given to patient, he is agreeable to a CT of the chest, inhaler as rescue, short-term dosing of dexamethasone.  If no findings and no better, the next step will likely be pulmonary consultation for post Covid chronic cough.    Follow-up:  Return for As Needed - Depending on Test Results - Will Call.    Electronically signed by CHLOE Lan, 01/06/22, 2:02 PM CST.  "

## 2022-01-11 RX ORDER — DICLOFENAC SODIUM 75 MG/1
TABLET, DELAYED RELEASE ORAL
Qty: 180 TABLET | Refills: 0 | Status: SHIPPED | OUTPATIENT
Start: 2022-01-11 | End: 2022-04-12

## 2022-01-11 RX ORDER — ATORVASTATIN CALCIUM 20 MG/1
TABLET, FILM COATED ORAL
Qty: 90 TABLET | Refills: 0 | Status: SHIPPED | OUTPATIENT
Start: 2022-01-11 | End: 2022-04-12

## 2022-01-11 NOTE — TELEPHONE ENCOUNTER
Rx Refill Note  Requested Prescriptions     Pending Prescriptions Disp Refills   • atorvastatin (LIPITOR) 20 MG tablet [Pharmacy Med Name: ATORVASTATIN 20 MG TABLET] 90 tablet 0     Sig: TAKE 1 TABLET BY MOUTH EVERY DAY AT NIGHT   • diclofenac (VOLTAREN) 75 MG EC tablet [Pharmacy Med Name: DICLOFENAC SOD EC 75 MG TAB] 180 tablet 0     Sig: TAKE 1 TABLET BY MOUTH TWICE A DAY      Last office visit with prescribing clinician: 2/2/2021      Next office visit with prescribing clinician: Visit date not found            Lorri Patel MA  01/11/22, 08:03 CST

## 2022-01-14 ENCOUNTER — HOSPITAL ENCOUNTER (OUTPATIENT)
Dept: CT IMAGING | Facility: HOSPITAL | Age: 47
Discharge: HOME | End: 2022-01-14
Admitting: NURSE PRACTITIONER

## 2022-01-14 DIAGNOSIS — J18.9 PNEUMONIA OF RIGHT LUNG DUE TO INFECTIOUS ORGANISM, UNSPECIFIED PART OF LUNG: Primary | ICD-10-CM

## 2022-01-14 DIAGNOSIS — U09.9 POST-COVID CHRONIC COUGH: ICD-10-CM

## 2022-01-14 DIAGNOSIS — I10 ESSENTIAL HYPERTENSION: ICD-10-CM

## 2022-01-14 DIAGNOSIS — R05.3 POST-COVID CHRONIC COUGH: ICD-10-CM

## 2022-01-14 LAB — CREAT BLDA-MCNC: 1.1 MG/DL (ref 0.6–1.3)

## 2022-01-14 PROCEDURE — 25010000002 IOPAMIDOL 61 % SOLUTION: Performed by: NURSE PRACTITIONER

## 2022-01-14 PROCEDURE — 82565 ASSAY OF CREATININE: CPT

## 2022-01-14 PROCEDURE — 71260 CT THORAX DX C+: CPT

## 2022-01-14 RX ORDER — GUAIFENESIN 600 MG/1
1200 TABLET, EXTENDED RELEASE ORAL 2 TIMES DAILY
Qty: 40 TABLET | Refills: 0 | Status: SHIPPED | OUTPATIENT
Start: 2022-01-14 | End: 2022-01-24

## 2022-01-14 RX ORDER — METOPROLOL SUCCINATE 100 MG/1
TABLET, EXTENDED RELEASE ORAL
Qty: 90 TABLET | Refills: 1 | Status: SHIPPED | OUTPATIENT
Start: 2022-01-14 | End: 2022-02-28

## 2022-01-14 RX ORDER — LEVOFLOXACIN 500 MG/1
500 TABLET, FILM COATED ORAL DAILY
Qty: 10 TABLET | Refills: 0 | Status: SHIPPED | OUTPATIENT
Start: 2022-01-14 | End: 2022-01-24

## 2022-01-14 RX ADMIN — IOPAMIDOL 100 ML: 612 INJECTION, SOLUTION INTRAVENOUS at 08:28

## 2022-01-14 NOTE — PROGRESS NOTES
"Please call the patient - I believe he has a \"walking\" pneumonia that is unresolved.  Since he has the fatigue that is classic, would like to give a round of ABT to cover this.  If no better and still having post-covid sx, would like to refer to the post-covid clinic, but would like to try this first.  I am sending meds in.  Strict avoid dairy x 10 days.      Electronically signed by CHLOE Lan, 01/14/22, 9:46 AM CST.  "

## 2022-01-23 DIAGNOSIS — R05.3 POST-COVID CHRONIC COUGH: ICD-10-CM

## 2022-01-23 DIAGNOSIS — U09.9 POST-COVID CHRONIC COUGH: ICD-10-CM

## 2022-01-24 ENCOUNTER — OFFICE VISIT (OUTPATIENT)
Dept: FAMILY MEDICINE CLINIC | Facility: CLINIC | Age: 47
End: 2022-01-24

## 2022-01-24 VITALS
DIASTOLIC BLOOD PRESSURE: 88 MMHG | HEIGHT: 74 IN | SYSTOLIC BLOOD PRESSURE: 148 MMHG | BODY MASS INDEX: 40.43 KG/M2 | RESPIRATION RATE: 16 BRPM | TEMPERATURE: 97.5 F | OXYGEN SATURATION: 97 % | HEART RATE: 90 BPM | WEIGHT: 315 LBS

## 2022-01-24 DIAGNOSIS — R05.3 POST-COVID CHRONIC COUGH: Primary | ICD-10-CM

## 2022-01-24 DIAGNOSIS — U09.9 POST-COVID CHRONIC COUGH: Primary | ICD-10-CM

## 2022-01-24 DIAGNOSIS — J18.9 PNEUMONIA OF RIGHT LUNG DUE TO INFECTIOUS ORGANISM, UNSPECIFIED PART OF LUNG: ICD-10-CM

## 2022-01-24 PROCEDURE — 99213 OFFICE O/P EST LOW 20 MIN: CPT | Performed by: NURSE PRACTITIONER

## 2022-01-24 RX ORDER — ALBUTEROL SULFATE 90 UG/1
AEROSOL, METERED RESPIRATORY (INHALATION)
Qty: 8 G | Refills: 1 | Status: SHIPPED | OUTPATIENT
Start: 2022-01-24 | End: 2022-08-01

## 2022-01-24 NOTE — PROGRESS NOTES
"Subjective   Chief Complaint:  Requesting back to work note    History of Present Illness:  This 47 y.o. male was seen in the office today.  He presents back today requested back to work note.  He was seen on 1/5/2021 with chronic cough post Covid symptoms.  Subsequent CAT scan, reports breathing better, reports he returned to work.  After the CT, he completed a round of Levaquin and Mucinex.  Reports endurance improved.    Allergies   Allergen Reactions   • Hydrochlorothiazide Rash   • Lisinopril Cough     \"made me cough\"   • Norvasc [Amlodipine] Other (See Comments)     Gum issue      Current Outpatient Medications on File Prior to Visit   Medication Sig   • albuterol sulfate  (90 Base) MCG/ACT inhaler INHALE 2 PUFFS BY MOUTH 4 TIMES A DAY AS NEEDED FOR WHEEZING.   • atorvastatin (LIPITOR) 20 MG tablet TAKE 1 TABLET BY MOUTH EVERY DAY AT NIGHT   • Blood Glucose Monitoring Suppl (B-D LOGIC BLOOD GLUCOSE) w/Device kit 1 each Every Morning Before Breakfast. And once randomly during the day.   • diclofenac (VOLTAREN) 75 MG EC tablet TAKE 1 TABLET BY MOUTH TWICE A DAY   • glucose blood test strip Use as instructed once prior to breakfast and then once randomly during the day.   • guaiFENesin (Mucinex) 600 MG 12 hr tablet Take 2 tablets by mouth 2 (Two) Times a Day for 10 days.   • levoFLOXacin (Levaquin) 500 MG tablet Take 1 tablet by mouth Daily for 10 days.   • losartan (COZAAR) 100 MG tablet TAKE 1 TABLET BY MOUTH EVERY DAY   • metoprolol succinate XL (TOPROL-XL) 100 MG 24 hr tablet TAKE 1 TABLET BY MOUTH EVERY DAY   • Multiple Vitamins-Minerals (MULTI-VITAMIN GUMMIES) chewable tablet Chew 2 (Two) Times a Day.   • Omeprazole Magnesium (PRILOSEC OTC PO) Take 20 mg by mouth Daily.   • ONETOUCH DELICA LANCETS FINE misc Use 1 prior to breakfast and then 1 randomly during the day.   • tadalafil (CIALIS) 20 MG tablet Take 20 mg by mouth Daily As Needed for erectile dysfunction.   • Trulicity 1.5 MG/0.5ML solution " pen-injector INJECT 1.5 MG INTO THE SKIN ONCE WEEKLY   • [DISCONTINUED] albuterol sulfate  (90 Base) MCG/ACT inhaler Inhale 2 puffs 4 (Four) Times a Day As Needed for Wheezing.     No current facility-administered medications on file prior to visit.      Past Medical, Surgical, Social, and Family History:  Past Medical History:   Diagnosis Date   • GERD (gastroesophageal reflux disease)    • Hyperlipidemia    • Hypertension    • Obesity      Past Surgical History:   Procedure Laterality Date   • BACK SURGERY     • CAPSULE ENDOSCOPY N/A 10/27/2020    Procedure: CAPSULE ENDOSCOPY M2A;  Surgeon: Shakeel Saravia DO;  Location: [facility] ENDOSCOPY;  Service: Gastroenterology;  Laterality: N/A;   • COLONOSCOPY N/A 10/23/2020    Procedure: COLONOSCOPY WITH ANESTHESIA;  Surgeon: Shakeel Saravia DO;  Location: [facility] ENDOSCOPY;  Service: Gastroenterology;  Laterality: N/A;  pre op: heme pos stool  postop:polyps  PCP: Rajan Saxena MD   • ENDOSCOPY N/A 10/23/2020    Procedure: ESOPHAGOGASTRODUODENOSCOPY WITH ANESTHESIA;  Surgeon: Shakeel Saravia DO;  Location: [facility] ENDOSCOPY;  Service: Gastroenterology;  Laterality: N/A;  pre op: anemia  post op;normal  PCP: Rajan Saxena MD   • KNEE SURGERY Bilateral      Social History     Socioeconomic History   • Marital status: Single   • Number of children: 0   • Years of education: 12   Tobacco Use   • Smoking status: Former Smoker     Packs/day: 0.75     Types: Cigarettes     Start date: 1/10/1989     Quit date: 2012     Years since quittin.1   • Smokeless tobacco: Former User     Types: Chew     Quit date: 1990   Vaping Use   • Vaping Use: Former   Substance and Sexual Activity   • Alcohol use: Yes     Comment: 3 times weekly   • Drug use: No   • Sexual activity: Not Currently     Partners: Female     Birth control/protection: None     Family History   Problem Relation Age of Onset   • Lung cancer Paternal Grandfather    • Liver cancer  "Paternal Grandfather    • Colon cancer Neg Hx    • Colon polyps Neg Hx    • Esophageal cancer Neg Hx      Objective   Physical Exam  Vitals and nursing note reviewed.   Constitutional:       General: He is not in acute distress.     Appearance: He is obese. He is not diaphoretic.   HENT:      Head: Normocephalic and atraumatic.      Nose: Nose normal.   Eyes:      Conjunctiva/sclera: Conjunctivae normal.      Pupils: Pupils are equal, round, and reactive to light.   Neck:      Thyroid: No thyromegaly.      Vascular: No JVD.      Trachea: No tracheal deviation.   Cardiovascular:      Rate and Rhythm: Normal rate and regular rhythm.      Heart sounds: Normal heart sounds. No murmur heard.  No friction rub. No gallop.    Pulmonary:      Effort: Pulmonary effort is normal. No respiratory distress.      Breath sounds: Normal breath sounds. No wheezing.   Abdominal:      General: Bowel sounds are normal.      Palpations: Abdomen is soft.      Tenderness: There is no abdominal tenderness. There is no guarding.   Musculoskeletal:         General: No tenderness or deformity. Normal range of motion.      Cervical back: Normal range of motion and neck supple.   Lymphadenopathy:      Cervical: No cervical adenopathy.   Skin:     General: Skin is warm and dry.      Capillary Refill: Capillary refill takes less than 2 seconds.   Neurological:      Mental Status: He is alert and oriented to person, place, and time.   Psychiatric:         Behavior: Behavior normal.         Thought Content: Thought content normal.         Judgment: Judgment normal.     /88   Pulse 90   Temp 97.5 °F (36.4 °C)   Resp 16   Ht 188 cm (74\")   Wt (!) 159 kg (350 lb)   SpO2 97%   BMI 44.94 kg/m²     Prior Visit Notes/Records, Lab, Imaging, and Diagnostic Results Reviewed:  Study Result    Narrative & Impression   CT CHEST W CONTRAST DIAGNOSTIC- 1/14/2022 8:08 AM CST     HISTORY: Cough, persistent; R05.3-Chronic cough; U09.9-Post " covid-19  condition, unspecified      COMPARISON: None      Radiation dose equals  mGy-cm.  Automated exposure control dose  reduction technique was implemented.     TECHNIQUE: Serial helical tomographic images of the chest were acquired  following the infusion of Isovue contrast intravenously. Bone and soft  tissue algorithms were provided.       FINDINGS:   Neck base: The imaged portion of the neck and thyroid gland is  unremarkable.      Lungs: Medial right lower lobe groundglass opacity (image 88, series 4),  most concerning for infectious process. Tree-in-bud opacities also seen  in the right upper lobe (image 78, series 4), also concerning for  infectious process.. No pleural effusion is present. The trachea and  bronchial tree are patent.      Heart: Normal in size and appearance. There is no pericardial effusion.      Vasculature: Aorta is normal in caliber and appearance without  significant atherosclerosis, stenosis, or aneurysm. No coronary  arteriosclerosis identified. The great vessels are normal in appearance.  The pulmonary arteries are normal in appearance.     Lymph nodes: No enlarged axillary, hilar, or mediastinal lymph nodes.      Bones and soft tissues: No acute osseous or soft tissue abnormality is  seen. There are no worrisome osseous lesions.      Upper abdomen: The imaged portion of the upper abdomen demonstrates no  acute findings.      IMPRESSION:   Groundglass opacities in the right lower lobe and tree-in-bud opacities  in the right upper lobe are most concerning for infectious process..        This report was finalized on 01/14/2022 08:51 by Dr. Marjan Fried MD.     Assessment/Plan   Diagnoses and all orders for this visit:    1. Post-COVID chronic cough (Primary)    2. Pneumonia of right lung due to infectious organism, unspecified part of lung    Discussion:  Advised and educated plan of care.      Follow-up:  Return if symptoms worsen or fail to improve.    Electronically  signed by Juanito Ureña, CHLOE, 01/24/22, 8:52 AM CST.

## 2022-01-24 NOTE — TELEPHONE ENCOUNTER
Rx Refill Note  Requested Prescriptions     Pending Prescriptions Disp Refills   • albuterol sulfate  (90 Base) MCG/ACT inhaler [Pharmacy Med Name: ALBUTEROL HFA (PROVENTIL) INH] 8 g 1     Sig: INHALE 2 PUFFS BY MOUTH 4 TIMES A DAY AS NEEDED FOR WHEEZING.      Last office visit with prescribing clinician: 1/5/2022      Next office visit with prescribing clinician: 1/24/2022            Lorri Patel MA  01/24/22, 07:10 CST      Last filled 1/5/22

## 2022-01-25 ENCOUNTER — TELEPHONE (OUTPATIENT)
Dept: FAMILY MEDICINE CLINIC | Facility: CLINIC | Age: 47
End: 2022-01-25

## 2022-01-25 NOTE — TELEPHONE ENCOUNTER
Caller: Hank Hanna    Relationship to patient: Self    Best call back number: [phone]    Patient is needing to check the status of the return to work paperwork that was supposed to be sent to [employer] yesterday. He states that they never received them, and he was informed that this would be done yesterday. Please advise.       Fax: [phone]

## 2022-02-28 DIAGNOSIS — I10 ESSENTIAL HYPERTENSION: ICD-10-CM

## 2022-02-28 RX ORDER — METOPROLOL SUCCINATE 100 MG/1
TABLET, EXTENDED RELEASE ORAL
Qty: 90 TABLET | Refills: 1 | Status: SHIPPED | OUTPATIENT
Start: 2022-02-28 | End: 2022-09-27

## 2022-04-12 RX ORDER — ATORVASTATIN CALCIUM 20 MG/1
TABLET, FILM COATED ORAL
Qty: 90 TABLET | Refills: 0 | Status: SHIPPED | OUTPATIENT
Start: 2022-04-12 | End: 2022-08-09

## 2022-04-12 RX ORDER — DICLOFENAC SODIUM 75 MG/1
TABLET, DELAYED RELEASE ORAL
Qty: 180 TABLET | Refills: 0 | Status: SHIPPED | OUTPATIENT
Start: 2022-04-12

## 2022-04-12 NOTE — TELEPHONE ENCOUNTER
Rx Refill Note  Requested Prescriptions     Pending Prescriptions Disp Refills   • atorvastatin (LIPITOR) 20 MG tablet [Pharmacy Med Name: ATORVASTATIN 20 MG TABLET] 90 tablet 0     Sig: TAKE 1 TABLET BY MOUTH EVERY DAY AT NIGHT   • diclofenac (VOLTAREN) 75 MG EC tablet [Pharmacy Med Name: DICLOFENAC SOD EC 75 MG TAB] 180 tablet 0     Sig: TAKE 1 TABLET BY MOUTH TWICE A DAY      Last office visit with prescribing clinician: 2/2/2021      Next office visit with prescribing clinician: Visit date not found            Kelly Spangler LPN  04/12/22, 11:59 CDT

## 2022-05-05 ENCOUNTER — TELEMEDICINE (OUTPATIENT)
Dept: FAMILY MEDICINE CLINIC | Facility: CLINIC | Age: 47
End: 2022-05-05

## 2022-05-05 DIAGNOSIS — B02.9 HERPES ZOSTER WITHOUT COMPLICATION: Primary | ICD-10-CM

## 2022-05-05 PROCEDURE — 99442 PR PHYS/QHP TELEPHONE EVALUATION 11-20 MIN: CPT | Performed by: NURSE PRACTITIONER

## 2022-05-05 RX ORDER — VALACYCLOVIR HYDROCHLORIDE 1 G/1
1000 TABLET, FILM COATED ORAL 3 TIMES DAILY
Qty: 21 TABLET | Refills: 0 | Status: SHIPPED | OUTPATIENT
Start: 2022-05-05 | End: 2022-05-12

## 2022-05-05 RX ORDER — GABAPENTIN 100 MG/1
100 CAPSULE ORAL 3 TIMES DAILY
Qty: 63 CAPSULE | Refills: 0 | Status: SHIPPED | OUTPATIENT
Start: 2022-05-05 | End: 2022-11-17

## 2022-05-05 NOTE — PROGRESS NOTES
"Subjective   Chief Complaint:  Possible Shingles    History of Present Illness:  After obtaining verbal consent to receive care via telephone, this 47 y.o. male was evaluated via telemedicine telephone conference today for evaluation of shingles.  Reports a few red blisters under left axilla.  Reports wrapped around left torso from back to chest.     Allergies   Allergen Reactions   • Hydrochlorothiazide Rash   • Lisinopril Cough     \"made me cough\"   • Norvasc [Amlodipine] Other (See Comments)     Gum issue      Current Outpatient Medications on File Prior to Visit   Medication Sig   • albuterol sulfate  (90 Base) MCG/ACT inhaler INHALE 2 PUFFS BY MOUTH 4 TIMES A DAY AS NEEDED FOR WHEEZING.   • atorvastatin (LIPITOR) 20 MG tablet TAKE 1 TABLET BY MOUTH EVERY DAY AT NIGHT   • Blood Glucose Monitoring Suppl ([employer] LOGIC BLOOD GLUCOSE) w/Device kit 1 each Every Morning Before Breakfast. And once randomly during the day.   • diclofenac (VOLTAREN) 75 MG EC tablet TAKE 1 TABLET BY MOUTH TWICE A DAY   • glucose blood test strip Use as instructed once prior to breakfast and then once randomly during the day.   • losartan (COZAAR) 100 MG tablet TAKE 1 TABLET BY MOUTH EVERY DAY   • metoprolol succinate XL (TOPROL-XL) 100 MG 24 hr tablet TAKE 1 TABLET BY MOUTH EVERY DAY   • Multiple Vitamins-Minerals (MULTI-VITAMIN GUMMIES) chewable tablet Chew 2 (Two) Times a Day.   • Omeprazole Magnesium (PRILOSEC OTC PO) Take 20 mg by mouth Daily.   • ONETOUCH DELICA LANCETS FINE misc Use 1 prior to breakfast and then 1 randomly during the day.   • tadalafil (CIALIS) 20 MG tablet Take 20 mg by mouth Daily As Needed for erectile dysfunction.   • Trulicity 1.5 MG/0.5ML solution pen-injector INJECT 1.5 MG INTO THE SKIN ONCE WEEKLY     No current facility-administered medications on file prior to visit.      Past Medical, Surgical, Social, and Family History:  Past Medical History:   Diagnosis Date   • GERD (gastroesophageal reflux " disease)    • Hyperlipidemia    • Hypertension    • Obesity      Past Surgical History:   Procedure Laterality Date   • BACK SURGERY     • CAPSULE ENDOSCOPY N/A 10/27/2020    Procedure: CAPSULE ENDOSCOPY M2A;  Surgeon: Shakeel Saravia DO;  Location:  [facility] ENDOSCOPY;  Service: Gastroenterology;  Laterality: N/A;   • COLONOSCOPY N/A 10/23/2020    Procedure: COLONOSCOPY WITH ANESTHESIA;  Surgeon: Shakeel Saravia DO;  Location:  [facility] ENDOSCOPY;  Service: Gastroenterology;  Laterality: N/A;  pre op: heme pos stool  postop:polyps  PCP: Rajan Saxena MD   • ENDOSCOPY N/A 10/23/2020    Procedure: ESOPHAGOGASTRODUODENOSCOPY WITH ANESTHESIA;  Surgeon: Shakeel Saravia DO;  Location: [facility] ENDOSCOPY;  Service: Gastroenterology;  Laterality: N/A;  pre op: anemia  post op;normal  PCP: Rajan Saxena MD   • KNEE SURGERY Bilateral      Social History     Socioeconomic History   • Marital status: Single   • Number of children: 0   • Years of education: 12   Tobacco Use   • Smoking status: Former Smoker     Packs/day: 0.75     Types: Cigarettes     Start date: 1/10/1989     Quit date: 2012     Years since quittin.4   • Smokeless tobacco: Former User     Types: Chew     Quit date: 1990   Vaping Use   • Vaping Use: Former   Substance and Sexual Activity   • Alcohol use: Yes     Comment: 3 times weekly   • Drug use: No   • Sexual activity: Not Currently     Partners: Female     Birth control/protection: None     Family History   Problem Relation Age of Onset   • Lung cancer Paternal Grandfather    • Liver cancer Paternal Grandfather    • Colon cancer Neg Hx    • Colon polyps Neg Hx    • Esophageal cancer Neg Hx      Review of Systems  Objective   Physical Exam:  Visit done through telephone call therefore no visualization, palpation, or percussion is possible.    Assessment/Plan   Diagnoses and all orders for this visit:    1. Herpes zoster without complication (Primary)  -     gabapentin  (Neurontin) 100 MG capsule; Take 1 capsule by mouth 3 (Three) Times a Day for 21 days.  Dispense: 63 capsule; Refill: 0    Other orders  -     valACYclovir (Valtrex) 1000 MG tablet; Take 1 tablet by mouth 3 (Three) Times a Day for 7 days.  Dispense: 21 tablet; Refill: 0    Discussion:  Advised and educated plan of care.      Attempted video visit-he is on a boat-had to use telephone.    Time = 12 Minutes  Telephone visit done due to request of patient after offering a video visit first.    Follow-up:  No follow-ups on file.    Note e-Signed by CHLOE Lan on 05/05/2022 at 13:05 CDT

## 2022-05-20 DIAGNOSIS — E11.21 CONTROLLED TYPE 2 DIABETES MELLITUS WITH DIABETIC NEPHROPATHY, UNSPECIFIED WHETHER LONG TERM INSULIN USE: ICD-10-CM

## 2022-05-20 RX ORDER — DULAGLUTIDE 1.5 MG/.5ML
INJECTION, SOLUTION SUBCUTANEOUS
Qty: 6 ML | Refills: 5 | Status: SHIPPED | OUTPATIENT
Start: 2022-05-20 | End: 2022-09-14 | Stop reason: DRUGHIGH

## 2022-05-20 NOTE — TELEPHONE ENCOUNTER
Rx Refill Note  Requested Prescriptions     Pending Prescriptions Disp Refills   • Trulicity 1.5 MG/0.5ML solution pen-injector [Pharmacy Med Name: TRULICITY 1.5 MG/0.5 ML PEN] 6 mL 5     Sig: INJECT 1.5 MG INTO THE SKIN ONCE WEEKLY      Last office visit with prescribing clinician: 2/2/2021      Next office visit with prescribing clinician: Visit date not found            Kelly Spangler LPN  05/20/22, 13:59 CDT

## 2022-07-31 DIAGNOSIS — U09.9 POST-COVID CHRONIC COUGH: ICD-10-CM

## 2022-07-31 DIAGNOSIS — R05.3 POST-COVID CHRONIC COUGH: ICD-10-CM

## 2022-08-01 RX ORDER — ALBUTEROL SULFATE 90 UG/1
AEROSOL, METERED RESPIRATORY (INHALATION)
Qty: 6.7 G | Refills: 1 | Status: SHIPPED | OUTPATIENT
Start: 2022-08-01 | End: 2022-10-05

## 2022-08-01 NOTE — TELEPHONE ENCOUNTER
Rx Refill Note  Requested Prescriptions     Pending Prescriptions Disp Refills   • albuterol sulfate  (90 Base) MCG/ACT inhaler [Pharmacy Med Name: ALBUTEROL HFA (PROVENTIL) INH] 6.7 g 1     Sig: INHALE 2 PUFFS BY MOUTH 4 TIMES A DAY AS NEEDED FOR WHEEZING      Last office visit with prescribing clinician: 1/24/2022      Next office visit with prescribing clinician: Visit date not found            Kelly Spangler LPN  08/01/22, 10:51 CDT

## 2022-08-09 DIAGNOSIS — E78.5 HYPERLIPIDEMIA, UNSPECIFIED HYPERLIPIDEMIA TYPE: Primary | ICD-10-CM

## 2022-08-09 DIAGNOSIS — I10 ESSENTIAL HYPERTENSION: ICD-10-CM

## 2022-08-09 RX ORDER — LOSARTAN POTASSIUM 100 MG/1
TABLET ORAL
Qty: 90 TABLET | Refills: 0 | Status: SHIPPED | OUTPATIENT
Start: 2022-08-09 | End: 2022-09-27

## 2022-08-09 RX ORDER — ATORVASTATIN CALCIUM 20 MG/1
TABLET, FILM COATED ORAL
Qty: 90 TABLET | Refills: 0 | Status: SHIPPED | OUTPATIENT
Start: 2022-08-09 | End: 2022-09-27

## 2022-08-09 NOTE — TELEPHONE ENCOUNTER
Rx Refill Note  Requested Prescriptions     Pending Prescriptions Disp Refills   • losartan (COZAAR) 100 MG tablet [Pharmacy Med Name: LOSARTAN POTASSIUM 100 MG TAB] 90 tablet 0     Sig: TAKE 1 TABLET BY MOUTH EVERY DAY   • atorvastatin (LIPITOR) 20 MG tablet [Pharmacy Med Name: ATORVASTATIN 20 MG TABLET] 90 tablet 0     Sig: TAKE 1 TABLET BY MOUTH EVERY DAY AT NIGHT      Last office visit with prescribing clinician: 2/2/2021      Next office visit with prescribing clinician: Visit date not found            Kelly Spangler LPN  08/09/22, 16:36 CDT

## 2022-09-14 ENCOUNTER — TRANSCRIBE ORDERS (OUTPATIENT)
Dept: ADMINISTRATIVE | Facility: HOSPITAL | Age: 47
End: 2022-09-14

## 2022-09-14 ENCOUNTER — LAB (OUTPATIENT)
Dept: LAB | Facility: HOSPITAL | Age: 47
End: 2022-09-14

## 2022-09-14 ENCOUNTER — TELEPHONE (OUTPATIENT)
Dept: FAMILY MEDICINE CLINIC | Facility: CLINIC | Age: 47
End: 2022-09-14

## 2022-09-14 ENCOUNTER — OFFICE VISIT (OUTPATIENT)
Dept: FAMILY MEDICINE CLINIC | Facility: CLINIC | Age: 47
End: 2022-09-14

## 2022-09-14 VITALS
HEIGHT: 74 IN | WEIGHT: 315 LBS | OXYGEN SATURATION: 97 % | RESPIRATION RATE: 18 BRPM | HEART RATE: 103 BPM | DIASTOLIC BLOOD PRESSURE: 84 MMHG | SYSTOLIC BLOOD PRESSURE: 138 MMHG | TEMPERATURE: 97.3 F | BODY MASS INDEX: 40.43 KG/M2

## 2022-09-14 DIAGNOSIS — D64.9 ANEMIA, UNSPECIFIED TYPE: ICD-10-CM

## 2022-09-14 DIAGNOSIS — E66.01 CLASS 3 SEVERE OBESITY DUE TO EXCESS CALORIES WITH BODY MASS INDEX (BMI) OF 40.0 TO 44.9 IN ADULT, UNSPECIFIED WHETHER SERIOUS COMORBIDITY PRESENT: ICD-10-CM

## 2022-09-14 DIAGNOSIS — N52.9 ERECTILE DYSFUNCTION, UNSPECIFIED ERECTILE DYSFUNCTION TYPE: ICD-10-CM

## 2022-09-14 DIAGNOSIS — H53.9 VISION CHANGES: ICD-10-CM

## 2022-09-14 DIAGNOSIS — M19.90 OSTEOARTHRITIS, UNSPECIFIED OSTEOARTHRITIS TYPE, UNSPECIFIED SITE: ICD-10-CM

## 2022-09-14 DIAGNOSIS — E11.21 CONTROLLED TYPE 2 DIABETES MELLITUS WITH DIABETIC NEPHROPATHY, UNSPECIFIED WHETHER LONG TERM INSULIN USE: ICD-10-CM

## 2022-09-14 DIAGNOSIS — Z79.4 INSULIN DEPENDENT TYPE 2 DIABETES MELLITUS: ICD-10-CM

## 2022-09-14 DIAGNOSIS — Z79.1 NSAID LONG-TERM USE: ICD-10-CM

## 2022-09-14 DIAGNOSIS — E78.5 HYPERLIPIDEMIA, UNSPECIFIED HYPERLIPIDEMIA TYPE: Chronic | ICD-10-CM

## 2022-09-14 DIAGNOSIS — E11.9 INSULIN DEPENDENT TYPE 2 DIABETES MELLITUS: ICD-10-CM

## 2022-09-14 DIAGNOSIS — Z86.16 HISTORY OF COVID-19: ICD-10-CM

## 2022-09-14 DIAGNOSIS — Z78.9 PATIENT UNDER CARE OF MULTIPLE PROVIDERS: ICD-10-CM

## 2022-09-14 DIAGNOSIS — I10 PRIMARY HYPERTENSION: Chronic | ICD-10-CM

## 2022-09-14 DIAGNOSIS — R06.02 SOB (SHORTNESS OF BREATH): ICD-10-CM

## 2022-09-14 DIAGNOSIS — L98.9 SKIN LESION: ICD-10-CM

## 2022-09-14 DIAGNOSIS — E11.65 UNCONTROLLED TYPE 2 DIABETES MELLITUS WITH HYPERGLYCEMIA: Primary | ICD-10-CM

## 2022-09-14 DIAGNOSIS — K21.9 GASTROESOPHAGEAL REFLUX DISEASE, UNSPECIFIED WHETHER ESOPHAGITIS PRESENT: Chronic | ICD-10-CM

## 2022-09-14 DIAGNOSIS — I10 PRIMARY HYPERTENSION: Primary | ICD-10-CM

## 2022-09-14 DIAGNOSIS — Z12.5 ENCOUNTER FOR PROSTATE CANCER SCREENING: ICD-10-CM

## 2022-09-14 PROBLEM — R60.0 LEG EDEMA: Status: ACTIVE | Noted: 2022-09-14

## 2022-09-14 LAB
ALBUMIN SERPL-MCNC: 4.6 G/DL (ref 3.5–5.2)
ALBUMIN/GLOB SERPL: 1.4 G/DL
ALP SERPL-CCNC: 146 U/L (ref 39–117)
ALT SERPL W P-5'-P-CCNC: 127 U/L (ref 1–41)
ANION GAP SERPL CALCULATED.3IONS-SCNC: 18 MMOL/L (ref 5–15)
AST SERPL-CCNC: 106 U/L (ref 1–40)
AUTO MIXED CELLS #: 0.7 10*3/MM3 (ref 0.1–2.6)
AUTO MIXED CELLS %: 8.8 % (ref 0.1–24)
BILIRUB SERPL-MCNC: 0.7 MG/DL (ref 0–1.2)
BUN SERPL-MCNC: 17 MG/DL (ref 6–20)
BUN/CREAT SERPL: 13.4 (ref 7–25)
CALCIUM SPEC-SCNC: 9.8 MG/DL (ref 8.6–10.5)
CHLORIDE SERPL-SCNC: 94 MMOL/L (ref 98–107)
CHOLEST SERPL-MCNC: 196 MG/DL (ref 0–200)
CO2 SERPL-SCNC: 21 MMOL/L (ref 22–29)
CREAT SERPL-MCNC: 1.27 MG/DL (ref 0.76–1.27)
EGFRCR SERPLBLD CKD-EPI 2021: 69.3 ML/MIN/1.73
ERYTHROCYTE [DISTWIDTH] IN BLOOD BY AUTOMATED COUNT: 13.2 % (ref 12.3–15.4)
GLOBULIN UR ELPH-MCNC: 3.4 GM/DL
GLUCOSE SERPL-MCNC: 416 MG/DL (ref 65–99)
HBA1C MFR BLD: 12.4 % (ref 4.8–5.9)
HCT VFR BLD AUTO: 40.6 % (ref 37.5–51)
HDLC SERPL-MCNC: 31 MG/DL (ref 40–60)
HGB BLD-MCNC: 13.3 G/DL (ref 13–17.7)
LDLC SERPL CALC-MCNC: 83 MG/DL (ref 0–100)
LDLC/HDLC SERPL: 2.07 {RATIO}
LYMPHOCYTES # BLD AUTO: 1.5 10*3/MM3 (ref 0.7–3.1)
LYMPHOCYTES NFR BLD AUTO: 20 % (ref 19.6–45.3)
MCH RBC QN AUTO: 27.8 PG (ref 26.6–33)
MCHC RBC AUTO-ENTMCNC: 32.8 G/DL (ref 31.5–35.7)
MCV RBC AUTO: 84.8 FL (ref 79–97)
NEUTROPHILS NFR BLD AUTO: 5.5 10*3/MM3 (ref 1.7–7)
NEUTROPHILS NFR BLD AUTO: 71.2 % (ref 42.7–76)
PLATELET # BLD AUTO: 248 10*3/MM3 (ref 140–450)
PMV BLD AUTO: 11.3 FL (ref 6–12)
POTASSIUM SERPL-SCNC: 4.5 MMOL/L (ref 3.5–5.2)
PROT SERPL-MCNC: 8 G/DL (ref 6–8.5)
PSA SERPL-MCNC: 1.79 NG/ML (ref 0–4)
RBC # BLD AUTO: 4.79 10*6/MM3 (ref 4.14–5.8)
SODIUM SERPL-SCNC: 133 MMOL/L (ref 136–145)
T4 FREE SERPL-MCNC: 1.61 NG/DL (ref 0.93–1.7)
TRIGL SERPL-MCNC: 504 MG/DL (ref 0–150)
TSH SERPL DL<=0.05 MIU/L-ACNC: 0.45 UIU/ML (ref 0.27–4.2)
VLDLC SERPL-MCNC: 82 MG/DL (ref 5–40)
WBC NRBC COR # BLD: 7.7 10*3/MM3 (ref 3.4–10.8)

## 2022-09-14 PROCEDURE — 80053 COMPREHEN METABOLIC PANEL: CPT | Performed by: FAMILY MEDICINE

## 2022-09-14 PROCEDURE — 99214 OFFICE O/P EST MOD 30 MIN: CPT | Performed by: FAMILY MEDICINE

## 2022-09-14 PROCEDURE — 85025 COMPLETE CBC W/AUTO DIFF WBC: CPT | Performed by: FAMILY MEDICINE

## 2022-09-14 PROCEDURE — 83036 HEMOGLOBIN GLYCOSYLATED A1C: CPT | Performed by: FAMILY MEDICINE

## 2022-09-14 PROCEDURE — 80061 LIPID PANEL: CPT | Performed by: FAMILY MEDICINE

## 2022-09-14 PROCEDURE — G0103 PSA SCREENING: HCPCS | Performed by: FAMILY MEDICINE

## 2022-09-14 PROCEDURE — 36415 COLL VENOUS BLD VENIPUNCTURE: CPT | Performed by: FAMILY MEDICINE

## 2022-09-14 PROCEDURE — 84439 ASSAY OF FREE THYROXINE: CPT | Performed by: FAMILY MEDICINE

## 2022-09-14 PROCEDURE — 84443 ASSAY THYROID STIM HORMONE: CPT | Performed by: FAMILY MEDICINE

## 2022-09-14 RX ORDER — INSULIN ASPART 100 [IU]/ML
INJECTION, SOLUTION INTRAVENOUS; SUBCUTANEOUS
Qty: 50 ML | Refills: 3 | Status: SHIPPED | OUTPATIENT
Start: 2022-09-14

## 2022-09-14 RX ORDER — BLOOD-GLUCOSE METER
1 EACH MISCELLANEOUS
Qty: 1 KIT | Refills: 0 | COMMUNITY
Start: 2022-09-14

## 2022-09-14 RX ORDER — INSULIN DETEMIR 100 [IU]/ML
20 INJECTION, SOLUTION SUBCUTANEOUS NIGHTLY
Qty: 20 ML | Refills: 3 | Status: SHIPPED | OUTPATIENT
Start: 2022-09-14 | End: 2022-11-28 | Stop reason: SDUPTHER

## 2022-09-14 NOTE — PROGRESS NOTES
Subjective   Hank Hanna is a 47 y.o. male presenting with chief complaint of:   Chief Complaint   Patient presents with   • Follow-up       History of Present Illness :  Alone.   Here for review of chronic problems that includes DM2 and others.      Has multiple chronic problems to consider that might have a bearing on today's issues;  an interval appointment.       Chronic/acute problems reviewed today:   1. Primary hypertension Chronic/stable. Stable here past/no recent home blood pressures.  No significant chest pain, SOB, LE edema, orthopnea, near syncope, dizziness/light headness.   Recent Vitals       1/5/2022 1/24/2022 9/14/2022       BP: -- 148/88 138/84     Pulse: 98 90 103     Temp: 97.1 °F (36.2 °C) 97.5 °F (36.4 °C) 97.3 °F (36.3 °C)     Weight: -- 159 kg (350 lb) 162 kg (356 lb 9.6 oz)     BMI (Calculated): -- 44.9 45.8            2. Hyperlipidemia, unspecified hyperlipidemia type Chronic/stable: prefers no statin to this point.  Prefers lifestyle over Rx;  with diet-exercise advised and lab moitored.     3. Controlled type 2 diabetes mellitus with diabetic nephropathy, unspecified whether long term insulin use (HCC) Chronic/unknown.  No problem/pattern hypoglycemia/hyperglycemia manifest by poly- dypsia, phagia, uria, or sweats, diaphoretic episodes, syncope/near.  He has no recent labs and does not do home monitoring     4. Gastroesophageal reflux disease, unspecified whether esophagitis present Chronic/stable.  Controlled heartburn, reflux without dysphagia, melena.  Rx used, periods not used proven currently needed with symptoms -currently doing ok.   He has bothered paying $40 a month over-the-counter for Prilosec     5. Anemia, unspecified type Chronic or past history/stable past: This has been present before.    There has been GI evaulation in the past. There is no current melena, hematochezia. It has been benign to date and stable/watching.  Contributing comorbidities to date: benign to  date.     6. Osteoarthritis, unspecified osteoarthritis type, unspecified site Chronic/stable.  Various on/off joint pains/soreness/stiffness.  Particular joint problems with various.  No joint swelling.  Treats mainly with reduced activity, Rx listed, Tylenol. Active NSAIDs, and no injections.      7. Class 3 severe obesity due to excess calories with body mass index (BMI) of 40.0 to 44.9 in adult, unspecified whether serious comorbidity present (HCC) Chronic/stable.  Aware, advised weight loss before.  On/off some success with weight loss but often with weight gain back.      8. NSAID long-term use Chronic use of NSAID.  Use is daily to frequent.   Per ROS/denies issues with use.   Has been warned of potential for GI toxicity (ulcers, bleeding), renal toxicity (even renal failure), liver injury, interference with control blood pressure and increased risk CV disease in general.  Advised as little use as able.     9. Erectile dysfunction, unspecified erectile dysfunction type Chronic/stable:  Occ use of Rx helps achieve, maintain erections to all for adequate intercourse.  No side effects encountered.  Aware to not use nitroglycerin products 24 hr of Rx.      10. History of COVID-19 had COVID a year or 2 ago; was not hospitalized and we did not take any medications.  He will not be vaccinated   11. SOB (shortness of breath) chronically a little short of breath with exertion; no cough or wheeze   12. Patient under care of multiple providers he gets care from different providers particularly from employment sources.  Even had COVID and we knew nothing about it.   13. Vision changes recently said his vision became change; he does not go so far; blurry he just says is different.  He went to not optometrist and was told there was retinopathy.  Then he went to the ophthalmology group; and was told the same.  He was not told he needed any treatment.  He is not sure of details beyond this.   14. Encounter for prostate  cancer screening he is approaching 48 and needs to have a PSA; voiding okay   15. Skin lesion chronically has a lesion on his back that gets sore from bumping into it.  He incidentally brought it up today that he wants it removed     Has an/another acute issue today: none.    The following portions of the patient's history were reviewed and updated as appropriate: allergies, current medications, past family history, past medical history, past social history, past surgical history and problem list.      Current Outpatient Medications:   •  atorvastatin (LIPITOR) 20 MG tablet, TAKE 1 TABLET BY MOUTH EVERY DAY AT NIGHT, Disp: 90 tablet, Rfl: 0  •  Blood Glucose Monitoring Suppl (B-D LOGIC BLOOD GLUCOSE) w/Device kit, 1 each Every Morning Before Breakfast. And once randomly during the day., Disp: 1 each, Rfl: 0  •  diclofenac (VOLTAREN) 75 MG EC tablet, TAKE 1 TABLET BY MOUTH TWICE A DAY, Disp: 180 tablet, Rfl: 0  •  glucose blood test strip, Use as instructed once prior to breakfast and then once randomly during the day., Disp: 100 each, Rfl: 2  •  losartan (COZAAR) 100 MG tablet, TAKE 1 TABLET BY MOUTH EVERY DAY, Disp: 90 tablet, Rfl: 0  •  metoprolol succinate XL (TOPROL-XL) 100 MG 24 hr tablet, TAKE 1 TABLET BY MOUTH EVERY DAY, Disp: 90 tablet, Rfl: 1  •  Multiple Vitamins-Minerals (MULTI-VITAMIN GUMMIES) chewable tablet, Chew 2 (Two) Times a Day., Disp: , Rfl:   •  Omeprazole Magnesium (PRILOSEC OTC PO), Take 20 mg by mouth Daily., Disp: , Rfl:   •  ONETOUCH DELICA LANCETS FINE misc, Use 1 prior to breakfast and then 1 randomly during the day., Disp: 100 each, Rfl: 2  •  tadalafil (CIALIS) 20 MG tablet, Take 20 mg by mouth Daily As Needed for erectile dysfunction., Disp: , Rfl:   •  Trulicity 1.5 MG/0.5ML solution pen-injector, INJECT 1.5 MG INTO THE SKIN ONCE WEEKLY, Disp: 6 mL, Rfl: 5  •  albuterol sulfate  (90 Base) MCG/ACT inhaler, INHALE 2 PUFFS BY MOUTH 4 TIMES A DAY AS NEEDED FOR WHEEZING, Disp: 6.7  "g, Rfl: 1  •  gabapentin (Neurontin) 100 MG capsule, Take 1 capsule by mouth 3 (Three) Times a Day for 21 days., Disp: 63 capsule, Rfl: 0    No problems with medications.    Allergies   Allergen Reactions   • Hydrochlorothiazide Rash   • Lisinopril Cough     \"made me cough\"   • Norvasc [Amlodipine] Other (See Comments)     Gum issue       Review of Systems  GENERAL:  Active/slower with limits, speed, samni for age. Sleeps ok. No fever.  ENDO:  No syncope, near or diaphoretic sweaty spells.    HEENT: No head injury or headache.   Above vision change; with glasses..  No hearing loss/pain/drainage.  No sore throat.  No nasal/sinus congestion/drainage. No epistaxis.  CHEST: No chest wall tenderness or mass. No cough, wheeze, hemoptysis; mild SOB exertion.  CV: No chest pain, palpatations, ankle edema.  GI: No heartburn, dysphagia, abdominal pain, diarrhea, constipation, rectal bleeding, or melena.  :  Voids without dysuria, or incontience to completion.  ORTHO: No painful/swollen joints but various on /off sore especially knees.   No sore neck or back.  No acute neck or back pain without recent injury.   NEURO: No dizziness, weakness of extremities.  No numbness/parethesias.   PSYCH: No memory loss.  Mood good; not anxious, depressed or suicidal.  Tolerated stress.      Screening:  Mammogram: NA  Bone density: NA  Low dose CT chest: Tobacco-smoker/age 14/<1 ppd/dc 12.1.2013 (22)-NA  GI:   EGD-neg/LIZZY/Manjit/10.23.20/prn  Colon-p/Manjit/LIZZY/10.23.20/3y  M2-missed small part/10.2020  Prostate: NA  Usual lab order  6m BMP, A1c  12m CBC, CMP, A1c, LIPID    Copy/paste function used for ROS/exam AND each area of these were reviewed, updated, confirmed and supplemented as needed.   Data reviewed:   Recent admit/ER/MD visits: none; did try to find some of his history care everwhere and was unable  Last cardiac testing:   Echo:   6.9.21  1. Ejection fraction 58%   2. Wall motion unremarkable   3. Myocardial perfusion imaging " "demonstrated homogeneous uptake of the   tracer in all visualized segments no areas of ischemia or infarction   are identified.   Summary impressions:   Normal ejection fraction 58% normal perfusion study no definite   evidence of ischemia or previous infarction.     Radiology considered:     Lab Results:  Results for orders placed or performed during the hospital encounter of 01/14/22   POC Creatinine    Specimen: Blood   Result Value Ref Range    Creatinine 1.10 0.60 - 1.30 mg/dL     CBC:No results for input(s): WBC, HGB, HCT, PLT, IRONSERUM, IRON in the last 20457 hours.   BMP/CMP:  Lab Results - Last 18 Months   Lab Units 01/14/22  0759   CREATININE mg/dL 1.10       Objective   /84 (BP Location: Left arm, Patient Position: Sitting, Cuff Size: Large Adult)   Pulse 103   Temp 97.3 °F (36.3 °C) (Infrared)   Resp 18   Ht 188 cm (74\")   Wt (!) 162 kg (356 lb 9.6 oz)   SpO2 97%   BMI 45.78 kg/m²   Body mass index is 45.78 kg/m².    Recent Vitals       2/2/2021 1/5/2022 1/24/2022       BP: 152/92 -- 148/88     Pulse: 85 98 90     Temp: 98 °F (36.7 °C) 97.1 °F (36.2 °C) 97.5 °F (36.4 °C)     Weight: 159 kg (350 lb) -- 159 kg (350 lb)     BMI (Calculated): 44.9 -- 44.9         Wt Readings from Last 15 Encounters:   09/14/22 0836 (!) 162 kg (356 lb 9.6 oz)   01/24/22 0840 (!) 159 kg (350 lb)   02/02/21 1306 (!) 159 kg (350 lb)   10/23/20 0928 (!) 155 kg (342 lb)   09/10/20 1242 (!) 158 kg (349 lb)   07/22/20 1308 (!) 160 kg (351 lb 12.8 oz)   12/31/19 1147 (!) 155 kg (342 lb)   10/07/19 1428 (!) 156 kg (343 lb)   12/11/18 0856 (!) 147 kg (325 lb)   01/20/17 1453 (!) 145 kg (319 lb)       Physical Exam  GENERAL:  Well nourished/developed  in no acute distress. Obese.   SKIN: Turgor excellent, without wound, rash, lesion beyond flesh toned 8mm/2mm base pliable lesion mid back; no PHOTO.   HEENT: Normal cephalic without trauma.  Pupils equal round reactive to light. Extraocular motions full without nystagmus.   " .  No thyroidmegaly, mass, tenderness, lymphadenopathy and supple.   CV: Regular rhythm.  No murmur, gallop, edema. Posterior pulses intact.  No carotid bruits.   CHEST: No chest wall tenderness or mass.   LUNGS: Symmetric motion with clear to auscultation.  No dullness to percussion.   ABD: Soft, nontender without mass.   ORTHO: Symmetric extremities without swelling/point tenderness.  Full gross range of motion.    NEURO: CN 2-12 grossly intact.  Symmetric facies. 2/4 x 4 biceps, knees equal reflexes.  UE/LE   4-5/5 strength throughout.  Nonfocal use extremities. Speech clear. Intact light touch with monofilament, vibratory sensation with tuning fork; equal toes/distal feet.    PSYCH: Oriented x 3.  Pleasant calm, well kept.  Purposeful/directed conservation with intact short/long gross memory.      Assessment & Plan     1. Primary hypertension    2. Hyperlipidemia, unspecified hyperlipidemia type    3. Controlled type 2 diabetes mellitus with diabetic nephropathy, unspecified whether long term insulin use (HCC)    4. Gastroesophageal reflux disease, unspecified whether esophagitis present    5. Anemia, unspecified type    6. Osteoarthritis, unspecified osteoarthritis type, unspecified site    7. Class 3 severe obesity due to excess calories with body mass index (BMI) of 40.0 to 44.9 in adult, unspecified whether serious comorbidity present (HCC)    8. NSAID long-term use    9. Erectile dysfunction, unspecified erectile dysfunction type    10. History of COVID-19    11. SOB (shortness of breath)    12. Patient under care of multiple providers    13. Vision changes    14. Encounter for prostate cancer screening    15. Skin lesion        Discussions/medical decisions/reviews:  BP ok  Other vitals ok    Hard to put anything together with various problems, Rx and sources of care  Start with labs  Need records from ophthalmology group  [facility] for now; want to see where renal/etc are at    Medical decision issues:    Data review above:   Rx: reviewed and decisions:   Visit today involved chronic significant medical problems or differentials and/or intensive drug monitoring: ie potential to cause serious morbidity or death:   Rx changes: none  No orders of the defined types were placed in this encounter.      Orders placed:   LAB/Testing/Referrals: reviewed/orders:   Today:   Orders Placed This Encounter   Procedures   • Comprehensive metabolic panel   • Lipid Panel With LDL/HDL Ratio   • TSH   • Hemoglobin A1c   • PSA Screen   • T4, Free   • CBC Auto Differential   • CBC and Differential     Chronic/recurrent labs above or change to:   Needed more regular     Screening reviewed/updated       There is no immunization history on file for this patient.  Vaccine reviewed: today none; later we advised/reaffirmed our support/suggestion for staying complete with covid- covid boosters, seasonal flu/yearly and any missing vaccine from list we supplied; we suggest contact with local health department office to review missing/needed vaccines and then bring nursing documentation for these vaccines to this office.     Health maintenance:   Body mass index is 45.78 kg/m².  Class 3 Severe Obesity (BMI >=40). Obesity-related health conditions include the following: diabetes mellitus. Obesity is unchanged. BMI is is above average; BMI management plan is completed. We discussed portion control and increasing exercise.      Tobacco use reviewed:   Hank Hanna  reports that he quit smoking about 9 years ago. His smoking use included cigarettes. He started smoking about 33 years ago. He smoked 0.75 packs per day. He quit smokeless tobacco use about 32 years ago.  His smokeless tobacco use included chew..    There are no Patient Instructions on file for this visit.    Follow up: Return for will see how testing/or treatment goes and decide;. and nonemergent skin day  Future Appointments   Date Time Provider Department Center   9/26/2022 11:45 AM  Rajan Saxena MD [facility]

## 2022-09-14 NOTE — TELEPHONE ENCOUNTER
"Reanna  Lab \"I have a critical lab, his glucose this am at 9:59a was 416.\"    Advised will inform Dr Saxena  "

## 2022-09-14 NOTE — PROGRESS NOTES
Spoke to patient and will come by the office and advise, medications to [facility] tessa kumari, did provide patient a diabetic meter sample, strips to pharmacy. Patient education and demonstrated the ability to dial correct dose and ability to inject, self. Patient was advised on his short acting and long acting insulin. Patient was sure of his ability to follow directions (printed lab, with Dr Saxena directions for sliding scale chart) advised on low what to look for low blood sugars and should get sugar tablets or glucose gel, as is OTC, pt stated understanding. Also advised pt if has any questions to please call the office, and appt made for tomorrow as directed by Dr Saxena.

## 2022-09-15 ENCOUNTER — OFFICE VISIT (OUTPATIENT)
Dept: FAMILY MEDICINE CLINIC | Facility: CLINIC | Age: 47
End: 2022-09-15

## 2022-09-15 VITALS
OXYGEN SATURATION: 98 % | WEIGHT: 315 LBS | HEIGHT: 74 IN | HEART RATE: 112 BPM | BODY MASS INDEX: 40.43 KG/M2 | TEMPERATURE: 97.3 F | SYSTOLIC BLOOD PRESSURE: 126 MMHG | RESPIRATION RATE: 18 BRPM | DIASTOLIC BLOOD PRESSURE: 78 MMHG

## 2022-09-15 DIAGNOSIS — E11.65 UNCONTROLLED TYPE 2 DIABETES MELLITUS WITH HYPERGLYCEMIA: ICD-10-CM

## 2022-09-15 DIAGNOSIS — E78.2 MIXED HYPERLIPIDEMIA: Chronic | ICD-10-CM

## 2022-09-15 PROCEDURE — 99214 OFFICE O/P EST MOD 30 MIN: CPT | Performed by: FAMILY MEDICINE

## 2022-09-15 NOTE — PROGRESS NOTES
Subjective   Hank Hanna is a 47 y.o. male presenting with chief complaint of:   Chief Complaint   Patient presents with   • Diabetes     Unable to urinate the way he was before starting insulin     [employer] exam due two years.       History of Present Illness :  Alone.  Here for primarily f/u yestereday.   Found to have BS 400s/mildly acidotic    Has multiple chronic problems to consider that might have a bearing on today's issues;  an interval appointment.       [id number] with sliding scale 48 units (48 + 20 long)    Chronic/acute problems reviewed today:   1. Uncontrolled type 2 diabetes mellitus with hyperglycemia (HCC)  Badly uncontrolled and needs acute/active adjustment of meds; adding insulin   2. Mixed hyperlipidemia      Has an/another acute issue today: none.    The following portions of the patient's history were reviewed and updated as appropriate: allergies, current medications, past family history, past medical history, past social history, past surgical history and problem list.      Current Outpatient Medications:   •  albuterol sulfate  (90 Base) MCG/ACT inhaler, INHALE 2 PUFFS BY MOUTH 4 TIMES A DAY AS NEEDED FOR WHEEZING, Disp: 6.7 g, Rfl: 1  •  atorvastatin (LIPITOR) 20 MG tablet, TAKE 1 TABLET BY MOUTH EVERY DAY AT NIGHT, Disp: 90 tablet, Rfl: 0  •  Blood Glucose Monitoring Suppl (Accu-Chek Guide Me) w/Device kit, 1 Device 4 (Four) Times a Day Before Meals & at Bedtime., Disp: 1 kit, Rfl: 0  •  Blood Glucose Monitoring Suppl (B-D LOGIC BLOOD GLUCOSE) w/Device kit, 1 each Every Morning Before Breakfast. And once randomly during the day., Disp: 1 each, Rfl: 0  •  diclofenac (VOLTAREN) 75 MG EC tablet, TAKE 1 TABLET BY MOUTH TWICE A DAY, Disp: 180 tablet, Rfl: 0  •  Dulaglutide 3 MG/0.5ML solution pen-injector, Inject 0.5 mL under the skin into the appropriate area as directed 1 (One) Time Per Week., Disp: 6 mL, Rfl: 3  •  gabapentin (Neurontin) 100 MG capsule, Take 1 capsule by mouth 3  "(Three) Times a Day for 21 days., Disp: 63 capsule, Rfl: 0  •  glucose blood (Accu-Chek Guide) test strip, Test every four hours while awake, bring in reading 24 hours, then test AC and HS, Disp: 200 each, Rfl: 5  •  insulin aspart (NovoLOG FlexPen) 100 UNIT/ML solution pen-injector sc pen, Sliding scale Q four hours, Disp: 50 mL, Rfl: 3  •  insulin detemir (Levemir FlexTouch) 100 UNIT/ML injection, Inject 20 Units under the skin into the appropriate area as directed Every Night., Disp: 20 mL, Rfl: 3  •  Insulin Pen Needle 31G X 8 MM misc, Use with Lantus nightly, use with Novolog every 4 hours for 24 hours then AC and HS, Disp: 200 each, Rfl: 3  •  losartan (COZAAR) 100 MG tablet, TAKE 1 TABLET BY MOUTH EVERY DAY, Disp: 90 tablet, Rfl: 0  •  metFORMIN (Glucophage) 500 MG tablet, Take 2 tablets by mouth Every Night for 90 days., Disp: 180 tablet, Rfl: 0  •  metoprolol succinate XL (TOPROL-XL) 100 MG 24 hr tablet, TAKE 1 TABLET BY MOUTH EVERY DAY, Disp: 90 tablet, Rfl: 1  •  Multiple Vitamins-Minerals (MULTI-VITAMIN GUMMIES) chewable tablet, Chew 2 (Two) Times a Day., Disp: , Rfl:   •  Omeprazole Magnesium (PRILOSEC OTC PO), Take 20 mg by mouth Daily., Disp: , Rfl:   •  ONETOUCH DELICA LANCETS FINE misc, Use 1 prior to breakfast and then 1 randomly during the day., Disp: 100 each, Rfl: 2  •  tadalafil (CIALIS) 20 MG tablet, Take 20 mg by mouth Daily As Needed for erectile dysfunction., Disp: , Rfl:     No problems with medications.    Allergies   Allergen Reactions   • Hydrochlorothiazide Rash   • Lisinopril Cough     \"made me cough\"   • Norvasc [Amlodipine] Other (See Comments)     Gum issue       Review of Systems  GENERAL:  Active/slower with limits, speed, samni for age. Sleeps ok. No fever.  ENDO:  No syncope, near or diaphoretic sweaty spells.    HEENT: No head injury or headache.   Recent mild blurring/ vision change; with glasses..  No hearing loss/pain/drainage.  No sore throat.  No nasal/sinus " congestion/drainage. No epistaxis.  CHEST: No chest wall tenderness or mass. No cough, wheeze, hemoptysis; mild SOB exertion.  CV: No chest pain, palpatations, ankle edema.  GI: No heartburn, dysphagia, abdominal pain, diarrhea, constipation, rectal bleeding, or melena.  :  Voids without dysuria, or incontience to completion.  ORTHO: No painful/swollen joints but various on /off sore especially knees.   No sore neck or back.  No acute neck or back pain without recent injury.   NEURO: No dizziness, weakness of extremities.  No numbness/parethesias.   PSYCH: No memory loss.  Mood good; not anxious, depressed or suicidal.  Tolerated stress.      Screening:  Mammogram: NA  Bone density: NA  Low dose CT chest: Tobacco-smoker/age 14/<1 ppd/dc 12.1.2013 (22)-NA  GI:   EGD-neg/LIZZY/Manjit/10.23.20/prn  Colon-p/Manjit/LIZZY/10.23.20/3y  M2-missed small part/10.2020  Prostate: NA  Usual lab order  6m BMP, A1c  12m CBC, CMP, A1c, LIPID     Copy/paste function used for ROS/exam AND each area of these were reviewed, updated, confirmed and supplemented as needed.   Data reviewed:   Recent admit/ER/MD visits: none; did try to find some of his history care everwhere and was unable  Last cardiac testing:   Echo:   6.9.21  1. Ejection fraction 58%   2. Wall motion unremarkable   3. Myocardial perfusion imaging demonstrated homogeneous uptake of the   tracer in all visualized segments no areas of ischemia or infarction   are identified.   Summary impressions:   Normal ejection fraction 58% normal perfusion study no definite   evidence of ischemia or previous infarction.      Radiology considered:   No radiology results for the last 90 days.    Lab Results:  Results for orders placed or performed in visit on 09/14/22   Lipid Panel    Specimen: Blood   Result Value Ref Range    Total Cholesterol 196 0 - 200 mg/dL    Triglycerides 504 (H) 0 - 150 mg/dL    HDL Cholesterol 31 (L) 40 - 60 mg/dL    LDL Cholesterol  83 0 - 100 mg/dL    VLDL  "Cholesterol 82 (H) 5 - 40 mg/dL    LDL/HDL Ratio 2.07        A1C:  Lab Results - Last 18 Months   Lab Units 09/14/22  0959   HEMOGLOBIN A1C % 12.4*     GLUCOSE:  Lab Results - Last 18 Months   Lab Units 09/14/22  0959   GLUCOSE mg/dL 416*     LIPID:  Lab Results - Last 18 Months   Lab Units 09/14/22  0959   LDL CHOL mg/dL 83   HDL CHOL mg/dL 31*   TRIGLYCERIDES mg/dL 504*     PSA:  Lab Results - Last 18 Months   Lab Units 09/14/22  0959   PSA ng/mL 1.790     CBC:  Lab Results - Last 18 Months   Lab Units 09/14/22  0959   WBC 10*3/mm3 7.70   HEMOGLOBIN g/dL 13.3   HEMATOCRIT % 40.6   PLATELETS 10*3/mm3 248      BMP/CMP:  Lab Results - Last 18 Months   Lab Units 09/14/22  0959 01/14/22  0759   SODIUM mmol/L 133*  --    POTASSIUM mmol/L 4.5  --    CHLORIDE mmol/L 94*  --    CO2 mmol/L 21.0*  --    GLUCOSE mg/dL 416*  --    BUN mg/dL 17  --    CREATININE mg/dL 1.27 1.10   CALCIUM mg/dL 9.8  --      HEPATIC:  Lab Results - Last 18 Months   Lab Units 09/14/22  0959   ALT (SGPT) U/L 127*   AST (SGOT) U/L 106*   ALK PHOS U/L 146*     Vit D:No results for input(s): [id number] in the last 93885 hours.  THYROID:  Lab Results - Last 18 Months   Lab Units 09/14/22  0959   TSH uIU/mL 0.454   FREE T4 ng/dL 1.61       Objective   /78 (BP Location: Left arm, Patient Position: Sitting, Cuff Size: Adult)   Pulse 112   Temp 97.3 °F (36.3 °C) (Infrared)   Resp 18   Ht 188 cm (74\")   Wt (!) 161 kg (356 lb)   SpO2 98%   BMI 45.71 kg/m²   Body mass index is 45.71 kg/m².    Recent Vitals       1/5/2022 1/24/2022 9/14/2022       BP: -- 148/88 138/84     Pulse: 98 90 103     Temp: 97.1 °F (36.2 °C) 97.5 °F (36.4 °C) 97.3 °F (36.3 °C)     Weight: -- 159 kg (350 lb) 162 kg (356 lb 9.6 oz)     BMI (Calculated): -- 44.9 45.8         Wt Readings from Last 15 Encounters:   09/15/22 1507 (!) 161 kg (356 lb)   09/14/22 0836 (!) 162 kg (356 lb 9.6 oz)   01/24/22 0840 (!) 159 kg (350 lb)   02/02/21 1306 (!) 159 kg (350 lb)   10/23/20 0928 " (!) 155 kg (342 lb)   09/10/20 1242 (!) 158 kg (349 lb)   07/22/20 1308 (!) 160 kg (351 lb 12.8 oz)   12/31/19 1147 (!) 155 kg (342 lb)   10/07/19 1428 (!) 156 kg (343 lb)   12/11/18 0856 (!) 147 kg (325 lb)   01/20/17 1453 (!) 145 kg (319 lb)       Physical Exam  GENERAL:  Well nourished/developed  in no acute distress. Obese.   SKIN: Turgor excellent, without wound, rash, lesion.   HEENT: Normal cephalic without trauma.  Pupils equal round reactive to light. Extraocular motions full without nystagmus.   .  No thyroidmegaly, mass, tenderness, lymphadenopathy and supple.   CV: Regular rhythm.  No murmur, gallop; trace/equal LE pitting edema. Posterior pulses intact.  No carotid bruits.   CHEST: No chest wall tenderness or mass.   LUNGS: Symmetric motion with clear to auscultation.   ABD: Soft, nontender without mass.   ORTHO: Symmetric extremities without swelling/point tenderness.  Full gross range of motion.    NEURO: CN 2-12 grossly intact.  Symmetric facies. Nonfocal use extremities. Speech clear.   PSYCH: Oriented x 3.  Pleasant calm, well kept.  Purposeful/directed conservation with intact short/long gross memory.      Assessment & Plan     1. Uncontrolled type 2 diabetes mellitus with hyperglycemia (HCC)    2. 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      Orders placed:   LAB/Testing/Referrals: reviewed/orders:   Today:   No orders of the defined types were placed in this encounter.    Chronic/recurrent labs above or change to:   Same for now     Screening reviewed/updated as usual      There is no immunization history on file for this patient.  Vaccine reviewed: today none; later we advised/reaffirmed our support/suggestion for staying complete with covid- covid boosters, seasonal flu/yearly and any missing vaccine from list we supplied; we suggest contact with local health department office to review missing/needed vaccines and then bring nursing documentation for these vaccines to this office.     Health maintenance:   Body mass index is 45.71 kg/m².  Class 3 Severe Obesity (BMI >=40). Obesity-related health conditions include the following: diabetes mellitus. Obesity is unchanged. BMI is is above average; BMI management plan is completed. We discussed portion control and increasing exercise.      Tobacco use reviewed:   Hank Hanna  reports that he quit smoking about 9 years ago. His smoking use included cigarettes. He started smoking about 33 years ago. He smoked 0.75 packs per day. He quit smokeless tobacco use about 32 years ago.  His smokeless tobacco use included chew..   There are no Patient Instructions on file for this visit.    Follow up: No follow-ups on file.  Future Appointments   Date Time Provider Department Center   9/26/2022 11:45 AM Rajan Saxena MD [facility]

## 2022-09-26 ENCOUNTER — PROCEDURE VISIT (OUTPATIENT)
Dept: FAMILY MEDICINE CLINIC | Facility: CLINIC | Age: 47
End: 2022-09-26

## 2022-09-26 VITALS
RESPIRATION RATE: 18 BRPM | BODY MASS INDEX: 40.43 KG/M2 | HEIGHT: 74 IN | TEMPERATURE: 97.1 F | WEIGHT: 315 LBS | OXYGEN SATURATION: 98 % | HEART RATE: 84 BPM | SYSTOLIC BLOOD PRESSURE: 144 MMHG | DIASTOLIC BLOOD PRESSURE: 88 MMHG

## 2022-09-26 DIAGNOSIS — Z76.89 RETURN TO WORK EVALUATION: ICD-10-CM

## 2022-09-26 DIAGNOSIS — L98.9 SKIN LESION: ICD-10-CM

## 2022-09-26 DIAGNOSIS — E11.65 UNCONTROLLED TYPE 2 DIABETES MELLITUS WITH HYPERGLYCEMIA: ICD-10-CM

## 2022-09-26 PROCEDURE — 17000 DESTRUCT PREMALG LESION: CPT | Performed by: FAMILY MEDICINE

## 2022-09-26 PROCEDURE — 99213 OFFICE O/P EST LOW 20 MIN: CPT | Performed by: FAMILY MEDICINE

## 2022-09-26 NOTE — PROGRESS NOTES
Subjective   Hank Hanna is a 47 y.o. male presenting with chief complaint of:   Chief Complaint   Patient presents with   • Abrasion     Non emergent skin day       History of Present Illness :  Alone.  Here for primarily an acute issue today; review of BS from last and removal skin tag/lesion back.   Here for review of chronic problems that includes DM and others.     Has multiple chronic problems to consider that might have a bearing on today's issues; somewhat an interval appointment.       Chronic/acute problems reviewed today:   1. Skin lesion chronic several months of a lesion in his mid back that is not particular growing but it catches on clothing and is worrisome.  Despite the uncontrolled nature of his diabetes we discussed last time him come in today to have this removed   2. Uncontrolled type 2 diabetes mellitus with hyperglycemia (HCC) Chronic/variable as last over 400 with mildly low bicarb.  Started long acting insulin/ss and increase trulicity 0.75 to 1.5.  No problem/pattern hypoglycemia/hyperglycemia manifest by poly- dypsia, phagia, uria, or sweats, diaphoretic episodes, syncope/near.    9.22. 22-long acting 30/26 short acting units  9.23.22-long acting 30/23 short acting    AM fasting BS   9.24.22 254  9.25.22 182    AM sliding scale around 3  Lunch sliding scale around 5-4  Supper sliding scale around 3     3 ? Return to work.  [employer] .  What we decide will have to be reviewed by employer MD     Has an/another acute issue today: none.    The following portions of the patient's history were reviewed and updated as appropriate: allergies, current medications, past family history, past medical history, past social history, past surgical history and problem list.      Current Outpatient Medications:   •  albuterol sulfate  (90 Base) MCG/ACT inhaler, INHALE 2 PUFFS BY MOUTH 4 TIMES A DAY AS NEEDED FOR WHEEZING, Disp: 6.7 g, Rfl: 1  •  atorvastatin (LIPITOR) 20 MG tablet, TAKE 1  TABLET BY MOUTH EVERY DAY AT NIGHT, Disp: 90 tablet, Rfl: 0  •  Blood Glucose Monitoring Suppl (Accu-Chek Guide Me) w/Device kit, 1 Device 4 (Four) Times a Day Before Meals & at Bedtime., Disp: 1 kit, Rfl: 0  •  Blood Glucose Monitoring Suppl (B-D LOGIC BLOOD GLUCOSE) w/Device kit, 1 each Every Morning Before Breakfast. And once randomly during the day., Disp: 1 each, Rfl: 0  •  diclofenac (VOLTAREN) 75 MG EC tablet, TAKE 1 TABLET BY MOUTH TWICE A DAY, Disp: 180 tablet, Rfl: 0  •  Dulaglutide 3 MG/0.5ML solution pen-injector, Inject 0.5 mL under the skin into the appropriate area as directed 1 (One) Time Per Week., Disp: 6 mL, Rfl: 3  •  glucose blood (Accu-Chek Guide) test strip, Test every four hours while awake, bring in reading 24 hours, then test AC and HS, Disp: 200 each, Rfl: 5  •  insulin aspart (NovoLOG FlexPen) 100 UNIT/ML solution pen-injector sc pen, Sliding scale Q four hours, Disp: 50 mL, Rfl: 3  •  insulin detemir (Levemir FlexTouch) 100 UNIT/ML injection, Inject 20 Units under the skin into the appropriate area as directed Every Night., Disp: 20 mL, Rfl: 3  •  Insulin Pen Needle 31G X 8 MM misc, Use with Lantus nightly, use with Novolog every 4 hours for 24 hours then AC and HS, Disp: 200 each, Rfl: 3  •  losartan (COZAAR) 100 MG tablet, TAKE 1 TABLET BY MOUTH EVERY DAY, Disp: 90 tablet, Rfl: 0  •  metFORMIN (Glucophage) 500 MG tablet, Take 2 tablets by mouth Every Night for 90 days., Disp: 180 tablet, Rfl: 0  •  metoprolol succinate XL (TOPROL-XL) 100 MG 24 hr tablet, TAKE 1 TABLET BY MOUTH EVERY DAY, Disp: 90 tablet, Rfl: 1  •  Multiple Vitamins-Minerals (MULTI-VITAMIN GUMMIES) chewable tablet, Chew 2 (Two) Times a Day., Disp: , Rfl:   •  Omeprazole Magnesium (PRILOSEC OTC PO), Take 20 mg by mouth Daily., Disp: , Rfl:   •  ONETOUCH DELICA LANCETS FINE misc, Use 1 prior to breakfast and then 1 randomly during the day., Disp: 100 each, Rfl: 2  •  tadalafil (CIALIS) 20 MG tablet, Take 20 mg by mouth  "Daily As Needed for erectile dysfunction., Disp: , Rfl:   •  gabapentin (Neurontin) 100 MG capsule, Take 1 capsule by mouth 3 (Three) Times a Day for 21 days., Disp: 63 capsule, Rfl: 0    DM meds:   •  Dulaglutide 3 MG/0.5ML solution pen-injector, Inject 0.5 mL under the skin into the appropriate area as directed 1 (One) Time Per Week., Disp: 6 mL, Rfl: 3  •  insulin aspart (NovoLOG FlexPen) 100 UNIT/ML solution pen-injector sc pen, Sliding scale Q four hours, Disp: 50 mL, Rfl: 3  •  insulin detemir (Levemir FlexTouch) 100 UNIT/ML injection, Inject 30 Units under the skin into the appropriate area as directed Every Night., Disp: 20 mL, Rfl: 3  •  metFORMIN (Glucophage) 500 MG tablet, Take 2 tablets by mouth Every Night for 90 days., Disp: 180 tablet, Rfl: 0    No problems with medications.    Allergies   Allergen Reactions   • Hydrochlorothiazide Rash   • Lisinopril Cough     \"made me cough\"   • Norvasc [Amlodipine] Other (See Comments)     Gum issue       Review of Systems  GENERAL:  Active/slower with limits, speed, samni for age. Sleeps ok. No fever.  ENDO:  No syncope, near or diaphoretic sweaty spells.    HEENT: No head injury or headache.   No vision change; with glasses..  No hearing loss/pain/drainage.  No sore throat.  No nasal/sinus congestion/drainage. No epistaxis.  CHEST: No chest wall tenderness or mass. No cough, wheeze, hemoptysis; mild SOB exertion.  CV: No chest pain, palpatations; same occ/mild ankle edema.  GI: No heartburn, dysphagia, abdominal pain, diarrhea, constipation, rectal bleeding, or melena.  :  Voids without dysuria, or incontience to completion.  ORTHO: No painful/swollen joints but various on /off sore especially knees.   No sore neck or back.  No acute neck or back pain without recent injury.   NEURO: No dizziness, weakness of extremities.  No numbness/parethesias.   PSYCH: No memory loss.  Mood good; not anxious, depressed or suicidal.  Tolerated stress.    "   Screening:  Mammogram: NA  Bone density: NA  Low dose CT chest: Tobacco-smoker/age 14/<1 ppd/dc 12.1.2013 (22)-NA  GI:   EGD-neg/BH/Manjit/10.23.20/prn  Colon-p/Manjit/LIZZY/10.23.20/3y  M2-missed small part/10.2020  Prostate: NA  Usual lab order  6m BMP, A1c  12m CBC, CMP, A1c, LIPID     Copy/paste function used for ROS/exam AND each area of these were reviewed, updated, confirmed and supplemented as needed.   Data reviewed:   Recent admit/ER/MD visits: none; did try to find some of his history care everwhere and was unable  Last cardiac testing:   Echo:   6.9.21  1. Ejection fraction 58%   2. Wall motion unremarkable   3. Myocardial perfusion imaging demonstrated homogeneous uptake of the   tracer in all visualized segments no areas of ischemia or infarction   are identified.   Summary impressions:   Normal ejection fraction 58% normal perfusion study no definite   evidence of ischemia or previous infarction.      Radiology considered:   No radiology results for the last 90 days.    Lab Results:  Results for orders placed or performed in visit on 09/14/22   Lipid Panel    Specimen: Blood   Result Value Ref Range    Total Cholesterol 196 0 - 200 mg/dL    Triglycerides 504 (H) 0 - 150 mg/dL    HDL Cholesterol 31 (L) 40 - 60 mg/dL    LDL Cholesterol  83 0 - 100 mg/dL    VLDL Cholesterol 82 (H) 5 - 40 mg/dL    LDL/HDL Ratio 2.07        A1C:  Lab Results - Last 18 Months   Lab Units 09/14/22  0959   HEMOGLOBIN A1C % 12.4*     GLUCOSE:  Lab Results - Last 18 Months   Lab Units 09/14/22  0959   GLUCOSE mg/dL 416*     LIPID:  Lab Results - Last 18 Months   Lab Units 09/14/22  0959   LDL CHOL mg/dL 83   HDL CHOL mg/dL 31*   TRIGLYCERIDES mg/dL 504*     PSA:  Lab Results - Last 18 Months   Lab Units 09/14/22  0959   PSA ng/mL 1.790     CBC:  Lab Results - Last 18 Months   Lab Units 09/14/22  0959   WBC 10*3/mm3 7.70   HEMOGLOBIN g/dL 13.3   HEMATOCRIT % 40.6   PLATELETS 10*3/mm3 248      BMP/CMP:  Lab Results - Last 18  "Months   Lab Units 09/14/22  0959 01/14/22  0759   SODIUM mmol/L 133*  --    POTASSIUM mmol/L 4.5  --    CHLORIDE mmol/L 94*  --    CO2 mmol/L 21.0*  --    GLUCOSE mg/dL 416*  --    BUN mg/dL 17  --    CREATININE mg/dL 1.27 1.10   CALCIUM mg/dL 9.8  --      HEPATIC:  Lab Results - Last 18 Months   Lab Units 09/14/22  0959   ALT (SGPT) U/L 127*   AST (SGOT) U/L 106*   ALK PHOS U/L 146*     Vit D:No results for input(s): RNFT16NS in the last 32537 hours.  THYROID:  Lab Results - Last 18 Months   Lab Units 09/14/22  0959   TSH uIU/mL 0.454   FREE T4 ng/dL 1.61       Objective   /88 (BP Location: Left arm, Patient Position: Sitting, Cuff Size: Adult)   Pulse 84   Temp 97.1 °F (36.2 °C) (Infrared)   Resp 18   Ht 188 cm (74\")   Wt (!) 163 kg (360 lb 3.2 oz)   SpO2 98%   BMI 46.25 kg/m²   Body mass index is 46.25 kg/m².    Recent Vitals       9/14/2022 9/15/2022 9/26/2022       BP: 138/84 126/78 144/88     Pulse: 103 112 84     Temp: 97.3 °F (36.3 °C) 97.3 °F (36.3 °C) 97.1 °F (36.2 °C)     Weight: 162 kg (356 lb 9.6 oz) 161 kg (356 lb) 163 kg (360 lb 3.2 oz)     BMI (Calculated): 45.8 45.7 46.2         Wt Readings from Last 15 Encounters:   09/26/22 1142 (!) 163 kg (360 lb 3.2 oz)   09/15/22 1507 (!) 161 kg (356 lb)   09/14/22 0836 (!) 162 kg (356 lb 9.6 oz)   01/24/22 0840 (!) 159 kg (350 lb)   02/02/21 1306 (!) 159 kg (350 lb)   10/23/20 0928 (!) 155 kg (342 lb)   09/10/20 1242 (!) 158 kg (349 lb)   07/22/20 1308 (!) 160 kg (351 lb 12.8 oz)   12/31/19 1147 (!) 155 kg (342 lb)   10/07/19 1428 (!) 156 kg (343 lb)   12/11/18 0856 (!) 147 kg (325 lb)   01/20/17 1453 (!) 145 kg (319 lb)       Physical Exam  GENERAL:  Well nourished/developed  in no acute distress. Obese.   SKIN: Turgor excellent, without wound, rash, lesion beyond PHOTO mid back; reviewed past PHOTO.   HEENT: Normal cephalic without trauma.  Pupils equal round reactive to light. Extraocular motions full without nystagmus.  No thyroidmegaly, " mass, tenderness, lymphadenopathy and supple.   CV: Regular rhythm.  No murmur, gallop, edema. Posterior pulses intact.  No carotid bruits.   CHEST: No chest wall tenderness or mass.   LUNGS: Symmetric motion with clear to auscultation.   ABD: Soft, nontender without mass.   ORTHO: Symmetric extremities without swelling/point tenderness.  Full gross range of motion.    NEURO: CN 2-12 grossly intact.  Symmetric facies.  UE/LE   4-5/5 strength throughout.  Nonfocal use extremities. Speech clear.   PSYCH: Oriented x 3.  Pleasant calm, well kept.  Purposeful/directed conservation with intact short/long gross memory.      Assessment & Plan     1. Skin lesion    2. Uncontrolled type 2 diabetes mellitus with hyperglycemia (HCC)    3. Return to work evaluation      Verbal permission given for procedure described as done with warning of pain, bleeding, infection, more surgery/expense and regrowth; all possible. Accepted  Cleansed the lesion/surrounding area with betadine wipes.   Lidocaine 1% with 1.5 cc infiltrated; and confirmed anesthesia/no pain perceived  # 15 blade; outlined 5mm base lesion and deeply shaved into the subdermal tissue.   Lesion base/edges was hyfricated repeated until no bleeding and noting some wound closure.  The wound was then cleansed with peroxide.  Triple antibiotic/equal was applied and bandage applied after that.    Asked to repeat this cleanse procedure as needed/at least twice a day until healed and report any signs of problems such as bleeding, pain, fever, nonhealing and regrowth in the future.   No path report pending      Discussions/medical decisions/reviews:  BP barely over; no chest pain, SOB, TIA/CVA sxs  Other vitals ok    Data review above:   Rx: reviewed and decisions:   Rx new/changes: above  No orders of the defined types were placed in this encounter.    Comfortable with getting on the boat-letter  Increase long acting insulin 35 units-watch the morning fasting sugar; do not go  be 120 often.   Carry/stop the short acting insulin (call if out of control)  Continue to monitor  Work letter    Orders placed:   LAB/Testing/Referrals: reviewed/orders:   Today:   No orders of the defined types were placed in this encounter.    Chronic/recurrent labs above or change to:   same     Screening reviewed/updated       There is no immunization history on file for this patient.  Vaccine reviewed: today none; later we advised/reaffirmed our support/suggestion for staying complete with covid- covid boosters, seasonal flu/yearly and any missing vaccine from list we supplied; we suggest contact with local health department office to review missing/needed vaccines and then bring nursing documentation for these vaccines to this office.     Health maintenance:   Body mass index is 46.25 kg/m².  Class 3 Severe Obesity (BMI >=40). Obesity-related health conditions include the following: diabetes mellitus. Obesity is unchanged. BMI is is above average; BMI management plan is completed. We discussed portion control and increasing exercise.      Tobacco use reviewed:   Hank Hanna  reports that he quit smoking about 9 years ago. His smoking use included cigarettes. He started smoking about 33 years ago. He smoked 0.75 packs per day. He quit smokeless tobacco use about 32 years ago.  His smokeless tobacco use included chew.. I have educated him on the risk of diseases from using tobacco products such as cancer, COPD and heart disease.     I advised him to quit and he is not willing to quit.    I spent 1 minutes counseling the patient.  Reminded; as discussed before.      Patient Instructions   Please cleanse your treatment site/wound with soap and clean water 2-3 times a day or as needed.  Please report signs of infection such as reddness, increasing pain, drainage, splitting or obvious problems now or regrowth in the future.  Try to keep the area dry...showering is ok if brief and dry well when done. If drainage or  the wound/site is going to be exposed to soilage; keep covered with bandage/bandaid as needed. Be aware there was a specimum/pathology taken today;  be sure and call us for results if not called by us in 7-10 days    #######################    If any skin lesion  Grows in size; especially greater than pencil eraser  Changes in color  Becomes ulcerated/bleeds  Itches/hurts  Or changes; let us know      Visit today involved chronic significant medical problems or differentials and/or intensive drug monitoring: ie potential to cause serious morbidity or death:     Follow up: Return for leave with work note; lab/Dr Saxena .  Future Appointments   Date Time Provider Department Center   12/21/2022  8:40 AM [facility]   12/28/2022  3:30 PM Rajan Saxena MD [facility]

## 2022-09-27 DIAGNOSIS — E78.5 HYPERLIPIDEMIA, UNSPECIFIED HYPERLIPIDEMIA TYPE: ICD-10-CM

## 2022-09-27 DIAGNOSIS — I10 ESSENTIAL HYPERTENSION: ICD-10-CM

## 2022-09-27 RX ORDER — ATORVASTATIN CALCIUM 20 MG/1
TABLET, FILM COATED ORAL
Qty: 90 TABLET | Refills: 3 | Status: SHIPPED | OUTPATIENT
Start: 2022-09-27

## 2022-09-27 RX ORDER — METOPROLOL SUCCINATE 100 MG/1
TABLET, EXTENDED RELEASE ORAL
Qty: 90 TABLET | Refills: 3 | Status: SHIPPED | OUTPATIENT
Start: 2022-09-27

## 2022-09-27 RX ORDER — LOSARTAN POTASSIUM 100 MG/1
TABLET ORAL
Qty: 90 TABLET | Refills: 3 | Status: SHIPPED | OUTPATIENT
Start: 2022-09-27

## 2022-09-27 NOTE — TELEPHONE ENCOUNTER
Rx Refill Note  Requested Prescriptions     Pending Prescriptions Disp Refills   • atorvastatin (LIPITOR) 20 MG tablet [Pharmacy Med Name: ATORVASTATIN 20 MG TABLET] 90 tablet 3     Sig: TAKE 1 TABLET BY MOUTH EVERY DAY AT NIGHT   • metoprolol succinate XL (TOPROL-XL) 100 MG 24 hr tablet [Pharmacy Med Name: METOPROLOL SUCC  MG TAB] 90 tablet 3     Sig: TAKE 1 TABLET BY MOUTH EVERY DAY   • losartan (COZAAR) 100 MG tablet [Pharmacy Med Name: LOSARTAN POTASSIUM 100 MG TAB] 90 tablet 3     Sig: TAKE 1 TABLET BY MOUTH EVERY DAY      Last office visit with prescribing clinician: 9/15/2022      Next office visit with prescribing clinician: 12/28/2022            Kelly Spangler, LPN  09/27/22, 11:12 CDT

## 2022-09-28 ENCOUNTER — TELEPHONE (OUTPATIENT)
Dept: FAMILY MEDICINE CLINIC | Facility: CLINIC | Age: 47
End: 2022-09-28

## 2022-09-28 ENCOUNTER — PRIOR AUTHORIZATION (OUTPATIENT)
Dept: FAMILY MEDICINE CLINIC | Facility: CLINIC | Age: 47
End: 2022-09-28

## 2022-09-28 DIAGNOSIS — E11.21 CONTROLLED TYPE 2 DIABETES MELLITUS WITH DIABETIC NEPHROPATHY, UNSPECIFIED WHETHER LONG TERM INSULIN USE: ICD-10-CM

## 2022-09-28 NOTE — TELEPHONE ENCOUNTER
PATIENT CALLED IN STATING HE NEEDS A PRIOR AUTHORIZATION FOR THE TRULICITY     BUT MEDICATION DID NOT LOAD ON PATIENTS MEDICATION LIST     GOOD CALL BACK   [phone]

## 2022-09-29 ENCOUNTER — TELEPHONE (OUTPATIENT)
Dept: FAMILY MEDICINE CLINIC | Facility: CLINIC | Age: 47
End: 2022-09-29

## 2022-09-29 RX ORDER — DULAGLUTIDE 1.5 MG/.5ML
3 INJECTION, SOLUTION SUBCUTANEOUS WEEKLY
Qty: 12 ML | Refills: 3 | Status: SHIPPED | OUTPATIENT
Start: 2022-09-29 | End: 2022-10-31 | Stop reason: SDUPTHER

## 2022-09-29 NOTE — TELEPHONE ENCOUNTER
Rep for [facility] nicole called to advise she has spoken to member and PA department    Pt needs an URGENT letter of medical necessity for the reason of increasing his trulisity to 3mg, as pt is getting back on the boat tomorrow and needs this letter marked urgent and faxed to Appeals Dept and faxed to [phone]

## 2022-10-04 ENCOUNTER — TELEPHONE (OUTPATIENT)
Dept: FAMILY MEDICINE CLINIC | Facility: CLINIC | Age: 47
End: 2022-10-04

## 2022-10-04 NOTE — TELEPHONE ENCOUNTER
Caller: Hank Hanna    Relationship to patient: Self    Best call back number: [phone]    Patient is needing:     PATIENT CALLED AND ADVISED THAT INSURANCE COMPANY IS REQUESTING MEDICAL NECCESITY FORM IS NEEDED AGAIN, CLINICAL NOTES AND TEST RESULTS TO HAVE [facility] APPROVED.     PLEASE FAX STATEMENT TO: [phone]        PLEASE CONTACT PATIENT AND ADVISE ONCE FAX IS SENT.

## 2022-10-05 DIAGNOSIS — R05.3 POST-COVID CHRONIC COUGH: ICD-10-CM

## 2022-10-05 DIAGNOSIS — U09.9 POST-COVID CHRONIC COUGH: ICD-10-CM

## 2022-10-05 RX ORDER — ALBUTEROL SULFATE 90 UG/1
AEROSOL, METERED RESPIRATORY (INHALATION)
Qty: 6.7 G | Refills: 1 | Status: SHIPPED | OUTPATIENT
Start: 2022-10-05 | End: 2022-12-27

## 2022-10-12 NOTE — TELEPHONE ENCOUNTER
Called pt and advised that he was able to  the Trulisity 1.5 mg take two injections weekly, but insurance is still fighting about getting him the 3.0 mg trulisity, all information faxed to number provide and advised pt

## 2022-10-31 RX ORDER — DULAGLUTIDE 1.5 MG/.5ML
3 INJECTION, SOLUTION SUBCUTANEOUS WEEKLY
Qty: 24 ML | Refills: 3 | Status: SHIPPED | OUTPATIENT
Start: 2022-10-31 | End: 2022-12-22 | Stop reason: SDUPTHER

## 2022-10-31 NOTE — TELEPHONE ENCOUNTER
"Pt called to advise 'my insurance will not pay for the 3 mg pen, but they will cover two of the 1.5 mg pens and ill need a refill\"     E-rx done    Rx Refill Note  Requested Prescriptions     Pending Prescriptions Disp Refills   • Dulaglutide (Trulicity) 1.5 MG/0.5ML solution pen-injector 24 mL 3     Sig: Inject 3 mg as directed 1 (One) Time Per Week.     Signed Prescriptions Disp Refills   • Dulaglutide (Trulicity) 1.5 MG/0.5ML solution pen-injector 12 mL 3     Sig: Inject 3 mg as directed 1 (One) Time Per Week.      Last office visit with prescribing clinician: 9/15/2022      Next office visit with prescribing clinician: 9/29/2022            Kelly Spangler LPN  10/31/22, 16:03 CDT  "

## 2022-11-07 ENCOUNTER — TELEPHONE (OUTPATIENT)
Dept: FAMILY MEDICINE CLINIC | Facility: CLINIC | Age: 47
End: 2022-11-07

## 2022-11-07 NOTE — TELEPHONE ENCOUNTER
"Pt called to advised \"there is a national of trulicity, my pharmacy was only able to give me one box of 1.5 mg, I will take one shot a week instead of the two shots, until I can find out if the shortage is lifted?  Is there another medication like trulicity that I could try?\"    Advised there is other GLP1 shortages, but will ask?  "

## 2022-11-09 NOTE — TELEPHONE ENCOUNTER
Called pt and advised that he was able to get prescription at [facility], but his insurance will not cover the trulicity 1.5 mg two shots. Advised he does have enough medication to make it to his next office visit, that had to be moved as patient will be on the boat 12-27-22

## 2022-11-17 ENCOUNTER — TELEPHONE (OUTPATIENT)
Dept: FAMILY MEDICINE CLINIC | Facility: CLINIC | Age: 47
End: 2022-11-17

## 2022-11-17 NOTE — TELEPHONE ENCOUNTER
Current Outpatient Medications for DM  •  Dulaglutide (Trulicity) 1.5 MG/0.5ML solution pen-injector, Inject 3 mg as directed 1 (One) Time Per Week., Disp: 24 mL, Rfl: 3  •  insulin detemir (Levemir FlexTouch) 100 UNIT/ML injection, Inject 20 Units under the skin into the appropriate area as directed Every Night., Disp: 20 mL, Rfl: 3  •  metFORMIN (Glucophage) 500 MG tablet, Take 2 tablets by mouth Every Night for 90 days., Disp: 180 tablet, Rfl: 0      Patient Entered Attachment - [id number] (11/09/2022)    7 day avg 92 H/L 104/84  0/0% hyper/hypo and no bolus  30 day 107   140/84   0/0  0/0%   No bolus  90 day avg 114  152/84  8.5%/0%    Be sure insulin dosing in Rx list accurate AND  What are his fasting/AM doing?   (for safety right now; decrease long acting 3units)

## 2022-11-17 NOTE — TELEPHONE ENCOUNTER
Called pt and advised he can only decrease his levemir by 10 u, does not have anything in between, he has been taking 30 u daily as Dr Saxena had directed him to do. So pt will decrease to the 20 U and send a week of fasting blood sugar readings

## 2022-11-25 DIAGNOSIS — E11.9 INSULIN DEPENDENT TYPE 2 DIABETES MELLITUS: ICD-10-CM

## 2022-11-25 DIAGNOSIS — E11.65 UNCONTROLLED TYPE 2 DIABETES MELLITUS WITH HYPERGLYCEMIA: ICD-10-CM

## 2022-11-25 DIAGNOSIS — Z79.4 INSULIN DEPENDENT TYPE 2 DIABETES MELLITUS: ICD-10-CM

## 2022-11-28 ENCOUNTER — TELEPHONE (OUTPATIENT)
Dept: FAMILY MEDICINE CLINIC | Facility: CLINIC | Age: 47
End: 2022-11-28

## 2022-11-29 NOTE — TELEPHONE ENCOUNTER
Confirm he is reasonable with the problem so we can more completely consider/review on his next apt since it is soon; 12.21.22

## 2022-11-29 NOTE — TELEPHONE ENCOUNTER
vanessa message    Ok thank you.   Another question. Since I started taking the Metformin I have developed a bloated stomach and constipation that requires laxatives. Is there another pill I can take that doesn’t have these side effects.

## 2022-12-21 DIAGNOSIS — I10 ESSENTIAL HYPERTENSION: ICD-10-CM

## 2022-12-21 DIAGNOSIS — E11.65 UNCONTROLLED TYPE 2 DIABETES MELLITUS WITH HYPERGLYCEMIA: Primary | ICD-10-CM

## 2022-12-21 DIAGNOSIS — E78.5 HYPERLIPIDEMIA, UNSPECIFIED HYPERLIPIDEMIA TYPE: ICD-10-CM

## 2022-12-22 ENCOUNTER — OFFICE VISIT (OUTPATIENT)
Dept: FAMILY MEDICINE CLINIC | Facility: CLINIC | Age: 47
End: 2022-12-22

## 2022-12-22 VITALS
SYSTOLIC BLOOD PRESSURE: 134 MMHG | RESPIRATION RATE: 12 BRPM | HEART RATE: 87 BPM | WEIGHT: 315 LBS | DIASTOLIC BLOOD PRESSURE: 82 MMHG | OXYGEN SATURATION: 95 % | BODY MASS INDEX: 40.43 KG/M2 | HEIGHT: 74 IN

## 2022-12-22 DIAGNOSIS — I10 PRIMARY HYPERTENSION: Chronic | ICD-10-CM

## 2022-12-22 DIAGNOSIS — N28.9 RENAL INSUFFICIENCY: ICD-10-CM

## 2022-12-22 DIAGNOSIS — R74.8 LIVER ENZYME ELEVATION: ICD-10-CM

## 2022-12-22 DIAGNOSIS — K21.9 GASTROESOPHAGEAL REFLUX DISEASE, UNSPECIFIED WHETHER ESOPHAGITIS PRESENT: ICD-10-CM

## 2022-12-22 DIAGNOSIS — E78.2 MIXED HYPERLIPIDEMIA: Chronic | ICD-10-CM

## 2022-12-22 DIAGNOSIS — E11.21 CONTROLLED TYPE 2 DIABETES MELLITUS WITH DIABETIC NEPHROPATHY, UNSPECIFIED WHETHER LONG TERM INSULIN USE: ICD-10-CM

## 2022-12-22 DIAGNOSIS — Z79.1 NSAID LONG-TERM USE: ICD-10-CM

## 2022-12-22 LAB
BUN SERPL-MCNC: 16 MG/DL (ref 6–20)
BUN/CREAT SERPL: 9.7 (ref 7–25)
CALCIUM SERPL-MCNC: 9.4 MG/DL (ref 8.6–10.5)
CHLORIDE SERPL-SCNC: 106 MMOL/L (ref 98–107)
CO2 SERPL-SCNC: 25 MMOL/L (ref 22–29)
CREAT SERPL-MCNC: 1.65 MG/DL (ref 0.76–1.27)
EGFRCR SERPLBLD CKD-EPI 2021: 50.6 ML/MIN/1.73
GLUCOSE SERPL-MCNC: 117 MG/DL (ref 65–99)
HBA1C MFR BLD: 7 % (ref 4.8–5.6)
POTASSIUM SERPL-SCNC: 4 MMOL/L (ref 3.5–5.2)
SODIUM SERPL-SCNC: 142 MMOL/L (ref 136–145)

## 2022-12-22 PROCEDURE — 99214 OFFICE O/P EST MOD 30 MIN: CPT | Performed by: FAMILY MEDICINE

## 2022-12-22 RX ORDER — DULAGLUTIDE 1.5 MG/.5ML
1.5 INJECTION, SOLUTION SUBCUTANEOUS WEEKLY
Qty: 6 ML | Refills: 1 | Status: SHIPPED | OUTPATIENT
Start: 2022-12-22

## 2022-12-22 RX ORDER — ESOMEPRAZOLE MAGNESIUM 40 MG/1
40 CAPSULE, DELAYED RELEASE ORAL
Qty: 30 CAPSULE | Refills: 2 | Status: SHIPPED | OUTPATIENT
Start: 2022-12-22 | End: 2023-03-21

## 2022-12-22 NOTE — PROGRESS NOTES
Subjective   Hank Hanna is a 47 y.o. male presenting with chief complaint of:   Chief Complaint   Patient presents with   • Follow-up     F/u on labwork drawn yesterday  Patient is req a Rx for prilosec instead of getting it otc.       History of Present Illness :  Alone.   Here for review of chronic problems that includes DM2 and others.     Has multiple chronic problems to consider that might have a bearing on today's issues;  an interval appointment.       Chronic/acute problems reviewed today:   1. Controlled type 2 diabetes mellitus with diabetic nephropathy, unspecified whether long term insulin use (HCC): Chronic/stable for several weeks despite remote past  and significant insulin tx No problem/pattern hypoglycemia/hyperglycemia manifest by poly- dypsia, phagia, uria, or sweats, diaphoretic episodes, syncope/near.  Wants to decrease Rx significantly and follow BS     2. Gastroesophageal reflux disease, unspecified whether esophagitis present: Chronic/stable.  Controlled with meds heartburn, reflux without dysphagia, melena.  Rx used, periods not used proven currently needed with symptoms -currently doing ok.      3. Mixed hyperlipidemia: Chronic/stable.  Tolerated use of Rx with labs showing improved lipid values and tolerant liver labs. No muscle aches unexpected.      4. Primary hypertension: Chronic/stable. Stable here past/no recent home blood pressures.  No significant chest pain, SOB, LE edema, orthopnea, near syncope, dizziness/light headness.   Recent Vitals       9/15/2022 9/26/2022 12/22/2022       BP: 126/78 144/88 134/82     Pulse: 112 84 87     Temp: 97.3 °F (36.3 °C) 97.1 °F (36.2 °C) --     Weight: 161 kg (356 lb) 163 kg (360 lb 3.2 oz) 164 kg (361 lb)     BMI (Calculated): 45.7 46.2 46.3            5 Renal insufficiency: Chronic/variable.  No/yet nephrology.  No dysuria.  Previously warned/reminded:   To avoid further kidney function decline  A. Treat any time you think you have  infection  B. Stay hydrated (dont get dehydrated); drink at least 60 oz fluid every 24 hr (1800 cc or nearly a 2L)  C. Do not allow any xrays with dye WITHOUT the doctor ordering checking your renal function  D. Do not get new medications without the doctor considering your renal condition  E. Do not use motrin/ibuprofen, alleve/naprosyn and these types of medications     6 Liver enzyme: elevated this time; risk fatty liver   7 Nsaid use: Chronic use of NSAID.  Use is daily.   Per ROS/denies issues with use.   Has been warned of potential for GI toxicity (ulcers, bleeding), renal toxicity (even renal failure), liver injury, interference with control blood pressure and increased risk CV disease in general.  Advised as little use as able.       Has an/another acute issue today: none.    The following portions of the patient's history were reviewed and updated as appropriate: allergies, current medications, past family history, past medical history, past social history, past surgical history and problem list.      Current Outpatient Medications:   •  albuterol sulfate  (90 Base) MCG/ACT inhaler, INHALE 2 PUFFS BY MOUTH 4 TIMES A DAY AS NEEDED FOR WHEEZING, Disp: 6.7 g, Rfl: 1  •  atorvastatin (LIPITOR) 20 MG tablet, TAKE 1 TABLET BY MOUTH EVERY DAY AT NIGHT, Disp: 90 tablet, Rfl: 3  •  diclofenac (VOLTAREN) 75 MG EC tablet, TAKE 1 TABLET BY MOUTH TWICE A DAY, Disp: 180 tablet, Rfl: 0  •  Dulaglutide (Trulicity) 1.5 MG/0.5ML solution pen-injector, Inject 3 mg as directed 1 (One) Time Per Week., Disp: 24 mL, Rfl: 3  •  losartan (COZAAR) 100 MG tablet, TAKE 1 TABLET BY MOUTH EVERY DAY, Disp: 90 tablet, Rfl: 3  •  metoprolol succinate XL (TOPROL-XL) 100 MG 24 hr tablet, TAKE 1 TABLET BY MOUTH EVERY DAY, Disp: 90 tablet, Rfl: 3  •  Multiple Vitamins-Minerals (MULTI-VITAMIN GUMMIES) chewable tablet, Chew 2 (Two) Times a Day., Disp: , Rfl:   •  Omeprazole Magnesium (PRILOSEC OTC PO), Take 20 mg by mouth Daily., Disp: ,  "Rfl:   •  tadalafil (CIALIS) 20 MG tablet, Take 20 mg by mouth Daily As Needed for erectile dysfunction., Disp: , Rfl:   •  Blood Glucose Monitoring Suppl (Accu-Chek Guide Me) w/Device kit, 1 Device 4 (Four) Times a Day Before Meals & at Bedtime., Disp: 1 kit, Rfl: 0  •  Blood Glucose Monitoring Suppl (B-D LOGIC BLOOD GLUCOSE) w/Device kit, 1 each Every Morning Before Breakfast. And once randomly during the day., Disp: 1 each, Rfl: 0  •  glucose blood (Accu-Chek Guide) test strip, Test every four hours while awake, bring in reading 24 hours, then test AC and HS, Disp: 200 each, Rfl: 5  •  insulin aspart (NovoLOG FlexPen) 100 UNIT/ML solution pen-injector sc pen, Sliding scale Q four hours, Disp: 50 mL, Rfl: 3  •  insulin detemir (Levemir FlexTouch) 100 UNIT/ML injection, Inject 15 Units under the skin into the appropriate area as directed Every Night., Disp: 20 mL, Rfl: 3  •  Insulin Pen Needle 31G X 8 MM misc, Use with Lantus nightly, use with Novolog every 4 hours for 24 hours then AC and HS, Disp: 200 each, Rfl: 3  •  metFORMIN (GLUCOPHAGE) 500 MG tablet, TAKE 2 TABLETS BY MOUTH EVERY NIGHT FOR 90 DAYS., Disp: 180 tablet, Rfl: 0  •  ONETOUCH DELICA LANCETS FINE misc, Use 1 prior to breakfast and then 1 randomly during the day., Disp: 100 each, Rfl: 2    No problems with medications.    Allergies   Allergen Reactions   • Hydrochlorothiazide Rash   • Lisinopril Cough     \"made me cough\"   • Norvasc [Amlodipine] Other (See Comments)     Gum issue       Review of Systems  GENERAL:  Active/slower with limits, speed, samni for age. Sleeps ok. No fever.  ENDO:  No syncope, near or diaphoretic sweaty spells.    HEENT: No head injury or headache.   No vision change; with glasses..  No hearing loss/pain/drainage.  No sore throat.  No nasal/sinus congestion/drainage. No epistaxis.  CHEST: No chest wall tenderness or mass. No cough, wheeze, hemoptysis; mild SOB exertion.  CV: No chest pain, palpatations; same occ/mild ankle " edema.  GI: No heartburn, dysphagia, abdominal pain, diarrhea, constipation, rectal bleeding, or melena.  :  Voids without dysuria, or incontience to completion.  ORTHO: No painful/swollen joints but various on /off sore especially knees.   No sore neck or back.  No acute neck or back pain without recent injury.   NEURO: No dizziness, weakness of extremities.  No numbness/parethesias.   PSYCH: No memory loss.  Mood good; not anxious, depressed or suicidal.  Tolerated stress.      Screening:  Mammogram: NA  Bone density: NA  Low dose CT chest: Tobacco-smoker/age 14/<1 ppd/dc 12.1.2013 (22)-NA  GI:   EGD-neg/LIZZY/Manjit/10.23.20/prn  Colon-p/Manjit/LIZZY/10.23.20/3y  M2-missed small part/10.2020  Prostate: NA  Usual lab order  6m BMP, A1c  12m CBC, CMP, A1c, LIPID    Copy/paste function used for ROS/exam AND each area of these were reviewed, updated, confirmed and supplemented as needed.  Data reviewed:   Recent admit/ER/MD visits: 9.26.22    1. Skin lesion    2. Uncontrolled type 2 diabetes mellitus with hyperglycemia (HCC)    3. Return to work evaluation       Verbal permission given for procedure described as done with warning of pain, bleeding, infection, more surgery/expense and regrowth; all possible. Accepted  Cleansed the lesion/surrounding area with betadine wipes.   Lidocaine 1% with 1.5 cc infiltrated; and confirmed anesthesia/no pain perceived  # 15 blade; outlined 5mm base lesion and deeply shaved into the subdermal tissue.   Lesion base/edges was hyfricated repeated until no bleeding and noting some wound closure.  The wound was then cleansed with peroxide.  Triple antibiotic/equal was applied and bandage applied after that.    Asked to repeat this cleanse procedure as needed/at least twice a day until healed and report any signs of problems such as bleeding, pain, fever, nonhealing and regrowth in the future.   No path report pending     Discussions/medical decisions/reviews:  BP barely over; no chest  pain, SOB, TIA/CVA sxs  Other vitals ok    Last cardiac testing:   Echo: none  Radiology considered:   No radiology results for the last 90 days.    Lab Results:  Results for orders placed or performed in visit on 12/21/22   Basic Metabolic Panel    Specimen: Blood   Result Value Ref Range    Glucose 117 (H) 65 - 99 mg/dL    BUN 16 6 - 20 mg/dL    Creatinine 1.65 (H) 0.76 - 1.27 mg/dL    EGFR Result 50.6 (L) >60.0 mL/min/1.73    BUN/Creatinine Ratio 9.7 7.0 - 25.0    Sodium 142 136 - 145 mmol/L    Potassium 4.0 3.5 - 5.2 mmol/L    Chloride 106 98 - 107 mmol/L    Total CO2 25.0 22.0 - 29.0 mmol/L    Calcium 9.4 8.6 - 10.5 mg/dL   Hemoglobin A1c    Specimen: Blood   Result Value Ref Range    Hemoglobin A1C 7.00 (H) 4.80 - 5.60 %       A1C:  Lab Results - Last 18 Months   Lab Units 12/21/22  1341 09/14/22  0959   HEMOGLOBIN A1C % 7.00* 12.4*     GLUCOSE:  Lab Results - Last 18 Months   Lab Units 12/21/22  1341 09/14/22  0959   GLUCOSE mg/dL 117* 416*     LIPID:  Lab Results - Last 18 Months   Lab Units 09/14/22  0959   LDL CHOL mg/dL 83   HDL CHOL mg/dL 31*   TRIGLYCERIDES mg/dL 504*     PSA:  Lab Results - Last 18 Months   Lab Units 09/14/22  0959   PSA ng/mL 1.790       CBC:  Lab Results - Last 18 Months   Lab Units 09/14/22  0959   WBC 10*3/mm3 7.70   HEMOGLOBIN g/dL 13.3   HEMATOCRIT % 40.6   PLATELETS 10*3/mm3 248      BMP/CMP:  Lab Results - Last 18 Months   Lab Units 12/21/22  1341 09/14/22  0959 01/14/22  0759   SODIUM mmol/L 142 133*  --    POTASSIUM mmol/L 4.0 4.5  --    CHLORIDE mmol/L 106 94*  --    TOTAL CO2 mmol/L 25.0  --   --    CO2 mmol/L  --  21.0*  --    GLUCOSE mg/dL 117* 416*  --    BUN mg/dL 16 17  --    CREATININE mg/dL 1.65* 1.27 1.10   EGFR RESULT mL/min/1.73 50.6*  --   --    CALCIUM mg/dL 9.4 9.8  --      HEPATIC:  Lab Results - Last 18 Months   Lab Units 09/14/22  0959   ALT (SGPT) U/L 127*   AST (SGOT) U/L 106*   ALK PHOS U/L 146*     Vit D:No results for input(s): WCMD20PI in the last  "11188 hours.  THYROID:  Lab Results - Last 18 Months   Lab Units 09/14/22  0959   TSH uIU/mL 0.454   FREE T4 ng/dL 1.61     Objective   /82   Pulse 87   Resp 12   Ht 188 cm (74\")   Wt (!) 164 kg (361 lb)   SpO2 95%   BMI 46.35 kg/m²   Body mass index is 46.35 kg/m².    Recent Vitals       9/15/2022 9/26/2022 12/22/2022       BP: 126/78 144/88 134/82     Pulse: 112 84 87     Temp: 97.3 °F (36.3 °C) 97.1 °F (36.2 °C) --     Weight: 161 kg (356 lb) 163 kg (360 lb 3.2 oz) 164 kg (361 lb)     BMI (Calculated): 45.7 46.2 46.3         Wt Readings from Last 15 Encounters:   12/22/22 1309 (!) 164 kg (361 lb)   09/26/22 1142 (!) 163 kg (360 lb 3.2 oz)   09/15/22 1507 (!) 161 kg (356 lb)   09/14/22 0836 (!) 162 kg (356 lb 9.6 oz)   01/24/22 0840 (!) 159 kg (350 lb)   02/02/21 1306 (!) 159 kg (350 lb)   10/23/20 0928 (!) 155 kg (342 lb)   09/10/20 1242 (!) 158 kg (349 lb)   07/22/20 1308 (!) 160 kg (351 lb 12.8 oz)   12/31/19 1147 (!) 155 kg (342 lb)   10/07/19 1428 (!) 156 kg (343 lb)   12/11/18 0856 (!) 147 kg (325 lb)   01/20/17 1453 (!) 145 kg (319 lb)       Physical Exam  GENERAL:  Well nourished/developed  in no acute distress. Obese.   SKIN: Turgor excellent, without wound, rash, lesion.   HEENT: Normal cephalic without trauma.  Pupils equal round reactive to light. Extraocular motions full without nystagmus.   .  No thyroidmegaly, mass, tenderness, lymphadenopathy and supple.   CV: Regular rhythm.  No murmur, gallop, edema. Posterior pulses intact.  No carotid bruits.   CHEST: No chest wall tenderness or mass.   LUNGS: Symmetric motion with clear to auscultation.  No dullness to percussion.   ABD: Soft, nontender without mass.   ORTHO: Symmetric extremities without swelling/point tenderness.  Full gross range of motion.    NEURO: CN 2-12 grossly intact.  Symmetric facies. 2/4 x 4 biceps, knees equal reflexes.  UE/LE   4-5/5 strength throughout.  Nonfocal use extremities. Speech clear. Intact light touch with " monofilament, vibratory sensation with tuning fork; equal toes/distal feet.    PSYCH: Oriented x 3.  Pleasant calm, well kept.  Purposeful/directed conservation with intact short/long gross memory.      Assessment & Plan     1. Controlled type 2 diabetes mellitus with diabetic nephropathy, unspecified whether long term insulin use (HCC)    2. Gastroesophageal reflux disease, unspecified whether esophagitis present    3. Mixed hyperlipidemia    4. Primary hypertension    5. Renal insufficiency    6. Liver enzyme elevation    7. NSAID long-term use      Data review above:   Discussions/medical decisions/reviews:  BP ok  Other vitals weight always an issue  DM/BS 7.0 12.21.22 from 12.4 9.14.22  Lipid LDL 83 9.14.22 statin  PSA ok 9.14.22  CBC ok 9.14.22  Renal 50.6 12.21.22  Liver elevations 9.14.22  Vit D sometime  Thyroid TSH, fT4 ok 9.14.22    Data review above:   Rx: reviewed and decisions:   Rx new/changes:   New Medications Ordered This Visit   Medications   • Dulaglutide (Trulicity) 1.5 MG/0.5ML solution pen-injector     Sig: Inject 1.5 mg as directed 1 (One) Time Per Week.     Dispense:  6 mL     Refill:  1   • esomeprazole (nexIUM) 40 MG capsule     Sig: Take 1 capsule by mouth Every Morning Before Breakfast.     Dispense:  30 capsule     Refill:  2     Cautious allow stopping DM Rx he desires; as he agrees to daily/random BS monitoring  Advised; watch weight also; could see weight gain with loss of GLP1  Ok nexium; while watching renal  Stay hydrated/watch renal-caution with NSAID  Would expect liver fatty liver; will follow with imaging if still up next lab 3m    Orders placed:   LAB/Testing/Referrals: reviewed/orders:   Today: lab 3m  Orders Placed This Encounter   Procedures   • Comprehensive Metabolic Panel   • Hemoglobin A1c     Chronic/recurrent labs above or change to:   Same   Screening reviewed/updated       There is no immunization history on file for this patient.  Vaccine reviewed: today none;  later we advised/reaffirmed our support/suggestion for staying complete with covid- covid boosters, seasonal flu/yearly and any missing vaccine from list we supplied; we suggest contact with local health department office to review missing/needed vaccines and then bring nursing documentation for these vaccines to this office.     Health maintenance:   Body mass index is 46.35 kg/m².  Class 3 Severe Obesity (BMI >=40). Obesity-related health conditions include the following: diabetes mellitus. Obesity is unchanged. BMI is is above average; BMI management plan is completed. We discussed portion control and increasing exercise.      Tobacco use reviewed:   Hank Hanna  reports that he quit smoking about 10 years ago. His smoking use included cigarettes. He started smoking about 33 years ago. He smoked an average of .75 packs per day. He quit smokeless tobacco use about 32 years ago.  His smokeless tobacco use included chew..     There are no Patient Instructions on file for this visit.    Visit today involved chronic significant medical problems or differentials and/or intensive drug monitoring: ie potential to cause serious morbidity or death:     Follow up: Return for lab 3m; then lab/Dr Saxena 6m.  Future Appointments   Date Time Provider Department Center   3/22/2023  8:50 AM [facility]   6/22/2023  2:45 PM Rajan Saxena MD [facility]

## 2022-12-24 PROBLEM — R74.8 LIVER ENZYME ELEVATION: Status: ACTIVE | Noted: 2022-12-24

## 2022-12-24 PROBLEM — N28.9 RENAL INSUFFICIENCY: Status: ACTIVE | Noted: 2022-12-24

## 2022-12-26 DIAGNOSIS — U09.9 POST-COVID CHRONIC COUGH: ICD-10-CM

## 2022-12-26 DIAGNOSIS — E11.9 INSULIN DEPENDENT TYPE 2 DIABETES MELLITUS: ICD-10-CM

## 2022-12-26 DIAGNOSIS — Z79.4 INSULIN DEPENDENT TYPE 2 DIABETES MELLITUS: ICD-10-CM

## 2022-12-26 DIAGNOSIS — R05.3 POST-COVID CHRONIC COUGH: ICD-10-CM

## 2022-12-26 DIAGNOSIS — E11.65 UNCONTROLLED TYPE 2 DIABETES MELLITUS WITH HYPERGLYCEMIA: ICD-10-CM

## 2022-12-27 RX ORDER — ALBUTEROL SULFATE 90 UG/1
AEROSOL, METERED RESPIRATORY (INHALATION)
Qty: 6.7 G | Refills: 1 | Status: SHIPPED | OUTPATIENT
Start: 2022-12-27

## 2022-12-27 NOTE — TELEPHONE ENCOUNTER
Rx Refill Note  Requested Prescriptions     Pending Prescriptions Disp Refills   • metFORMIN (GLUCOPHAGE) 500 MG tablet [Pharmacy Med Name: METFORMIN  MG TABLET] 180 tablet 0     Sig: TAKE 2 TABLETS BY MOUTH EVERY NIGHT   • albuterol sulfate  (90 Base) MCG/ACT inhaler [Pharmacy Med Name: ALBUTEROL HFA (PROVENTIL) INH]  1     Sig: INHALE 2 PUFFS BY MOUTH 4 TIMES A DAY AS NEEDED FOR WHEEZING      Last office visit with prescribing clinician: 12/22/2022      Next office visit with prescribing clinician: 6/22/2023            Matt Martínez MA  12/27/22, 11:23 CST

## 2023-03-21 DIAGNOSIS — K21.9 GASTROESOPHAGEAL REFLUX DISEASE, UNSPECIFIED WHETHER ESOPHAGITIS PRESENT: ICD-10-CM

## 2023-03-21 RX ORDER — ESOMEPRAZOLE MAGNESIUM 40 MG/1
CAPSULE, DELAYED RELEASE ORAL
Qty: 90 CAPSULE | Refills: 0 | Status: SHIPPED | OUTPATIENT
Start: 2023-03-21

## 2023-04-14 DIAGNOSIS — R05.3 POST-COVID CHRONIC COUGH: ICD-10-CM

## 2023-04-14 DIAGNOSIS — U09.9 POST-COVID CHRONIC COUGH: ICD-10-CM

## 2023-04-17 RX ORDER — ALBUTEROL SULFATE 90 UG/1
AEROSOL, METERED RESPIRATORY (INHALATION)
Qty: 6.7 G | Refills: 1 | Status: SHIPPED | OUTPATIENT
Start: 2023-04-17

## 2023-07-17 PROBLEM — R69 MULTIPLE CHRONIC DISEASES: Status: ACTIVE | Noted: 2023-07-17

## 2023-07-25 NOTE — TELEPHONE ENCOUNTER
Refill request [facility] for Diclofen[facility] 75mg bid, pt has not had labs or office vist since we have had Epic, call placed to pt and message left for him [facility] about scheduling?  
Spoke to pt and advised needs fasting labs and office visit, [place] on the boat and wont be home till 12-7-18, pt agreeable to come in for appt and get fasting labs done, rx refilled   
Home

## 2023-08-17 RX ORDER — SEMAGLUTIDE 0.68 MG/ML
INJECTION, SOLUTION SUBCUTANEOUS
Qty: 3 ML | Refills: 3 | Status: SHIPPED | OUTPATIENT
Start: 2023-08-17

## 2023-09-13 ENCOUNTER — TELEPHONE (OUTPATIENT)
Dept: FAMILY MEDICINE CLINIC | Facility: CLINIC | Age: 48
End: 2023-09-13
Payer: COMMERCIAL

## 2023-09-13 NOTE — TELEPHONE ENCOUNTER
Patient states his sugar has been controlled for several years, he states the only thing new that he has started is Nitric Oxide Supplement and we switched from trulicity to ozempic, he wanted to know if either of these could be making his sugars elevate now?

## 2023-09-13 NOTE — TELEPHONE ENCOUNTER
Also spoke with other provider since you were with patients and he advised for him to stop the supplement and see if sugars go down in the next couple of days, is this what you would also like? Does Ozempic make sugar elevate?

## 2023-09-25 DIAGNOSIS — E11.65 UNCONTROLLED TYPE 2 DIABETES MELLITUS WITH HYPERGLYCEMIA: ICD-10-CM

## 2023-09-25 DIAGNOSIS — I10 ESSENTIAL HYPERTENSION: ICD-10-CM

## 2023-09-25 DIAGNOSIS — K21.9 GASTROESOPHAGEAL REFLUX DISEASE, UNSPECIFIED WHETHER ESOPHAGITIS PRESENT: ICD-10-CM

## 2023-09-25 DIAGNOSIS — Z79.4 INSULIN DEPENDENT TYPE 2 DIABETES MELLITUS: ICD-10-CM

## 2023-09-25 DIAGNOSIS — E11.21 CONTROLLED TYPE 2 DIABETES MELLITUS WITH DIABETIC NEPHROPATHY, UNSPECIFIED WHETHER LONG TERM INSULIN USE: ICD-10-CM

## 2023-09-25 DIAGNOSIS — E11.9 INSULIN DEPENDENT TYPE 2 DIABETES MELLITUS: ICD-10-CM

## 2023-09-25 RX ORDER — ESOMEPRAZOLE MAGNESIUM 40 MG/1
40 CAPSULE, DELAYED RELEASE ORAL
Qty: 90 CAPSULE | Refills: 0 | OUTPATIENT
Start: 2023-09-25

## 2023-09-25 RX ORDER — PEN NEEDLE, DIABETIC 32GX 5/32"
1 NEEDLE, DISPOSABLE MISCELLANEOUS WEEKLY
Qty: 50 EACH | Refills: 0 | OUTPATIENT
Start: 2023-09-25

## 2023-09-25 RX ORDER — PEN NEEDLE, DIABETIC 32GX 5/32"
1 NEEDLE, DISPOSABLE MISCELLANEOUS WEEKLY
Qty: 50 EACH | Refills: 0 | Status: SHIPPED | OUTPATIENT
Start: 2023-09-25

## 2023-09-25 RX ORDER — LOSARTAN POTASSIUM 100 MG/1
100 TABLET ORAL DAILY
Qty: 90 TABLET | Refills: 3 | Status: SHIPPED | OUTPATIENT
Start: 2023-09-25

## 2023-09-25 RX ORDER — DICLOFENAC SODIUM 75 MG/1
75 TABLET, DELAYED RELEASE ORAL 2 TIMES DAILY
Qty: 180 TABLET | Refills: 0 | Status: SHIPPED | OUTPATIENT
Start: 2023-09-25

## 2023-09-25 RX ORDER — METOPROLOL SUCCINATE 100 MG/1
100 TABLET, EXTENDED RELEASE ORAL DAILY
Qty: 90 TABLET | Refills: 3 | OUTPATIENT
Start: 2023-09-25

## 2023-09-25 RX ORDER — SEMAGLUTIDE 0.68 MG/ML
INJECTION, SOLUTION SUBCUTANEOUS
Qty: 3 ML | Refills: 3 | OUTPATIENT
Start: 2023-09-25

## 2023-09-25 RX ORDER — ESOMEPRAZOLE MAGNESIUM 40 MG/1
40 CAPSULE, DELAYED RELEASE ORAL
Qty: 90 CAPSULE | Refills: 0 | Status: SHIPPED | OUTPATIENT
Start: 2023-09-25

## 2023-09-25 RX ORDER — DICLOFENAC SODIUM 75 MG/1
75 TABLET, DELAYED RELEASE ORAL 2 TIMES DAILY
Qty: 180 TABLET | Refills: 0 | OUTPATIENT
Start: 2023-09-25

## 2023-09-25 RX ORDER — LANCETS
EACH MISCELLANEOUS
OUTPATIENT
Start: 2023-09-25

## 2023-09-25 RX ORDER — BLOOD SUGAR DIAGNOSTIC
STRIP MISCELLANEOUS
Qty: 200 EACH | Refills: 5 | Status: SHIPPED | OUTPATIENT
Start: 2023-09-25

## 2023-09-25 RX ORDER — SEMAGLUTIDE 1.34 MG/ML
1 INJECTION, SOLUTION SUBCUTANEOUS WEEKLY
Qty: 3 ML | Refills: 5 | Status: SHIPPED | OUTPATIENT
Start: 2023-09-25

## 2023-09-25 RX ORDER — LOSARTAN POTASSIUM 100 MG/1
100 TABLET ORAL DAILY
Qty: 90 TABLET | Refills: 3 | OUTPATIENT
Start: 2023-09-25

## 2023-09-25 RX ORDER — BLOOD SUGAR DIAGNOSTIC
STRIP MISCELLANEOUS
Qty: 200 EACH | Refills: 5 | OUTPATIENT
Start: 2023-09-25

## 2023-09-25 RX ORDER — METOPROLOL SUCCINATE 100 MG/1
100 TABLET, EXTENDED RELEASE ORAL DAILY
Qty: 90 TABLET | Refills: 3 | Status: SHIPPED | OUTPATIENT
Start: 2023-09-25

## 2023-09-25 NOTE — TELEPHONE ENCOUNTER
Caller: Hank Hanna    Relationship: Self    Best call back number: [phone]    Requested Prescriptions     Pending Prescriptions Disp Refills    Insulin Pen Needle (BD Pen Needle Cesia U/F) 32G X 4 MM misc 50 each 0     Sig: Use 1 each 1 (One) Time Per Week.    glucose blood (Accu-Chek Guide) test strip 200 each 5     Sig: Test every four hours while awake, bring in reading 24 hours, then test AC and HS    Lancets (accu-chek multiclix) lancets       Sig: Use 1 prior to breakfast and then 1 randomly during the day.    diclofenac (VOLTAREN) 75 MG EC tablet 180 tablet 0     Sig: Take 1 tablet by mouth 2 (Two) Times a Day.    esomeprazole (nexIUM) 40 MG capsule 90 capsule 0     Sig: Take 1 capsule by mouth Every Morning Before Breakfast.    losartan (COZAAR) 100 MG tablet 90 tablet 3     Sig: Take 1 tablet by mouth Daily.    metoprolol succinate XL (TOPROL-XL) 100 MG 24 hr tablet 90 tablet 3     Sig: Take 1 tablet by mouth Daily.    Semaglutide,0.25 or 0.5MG/DOS, (Ozempic, 0.25 or 0.5 MG/DOSE,) 2 MG/3ML solution pen-injector 3 mL 3        Pharmacy where request should be sent:        [id number] - [place], [place] - [address] AT [address]CROSS FROM [address]  [phone] [facility] [phone] FX   Last office visit with prescribing clinician: 7/17/2023   Last telemedicine visit with prescribing clinician: Visit date not found   Next office visit with prescribing clinician: Visit date not found     Additional details provided by patient:     LESS THAN 3 DAYS ON NEEDLES.    REQUESTING OZEMPIC FOR A 90 DAY SUPPLY      Does the patient have less than a 3 day supply:  [x] Yes  [] No    Would you like a call back once the refill request has been completed: [] Yes [] No    If the office needs to give you a call back, can they leave a voicemail: [] Yes [] No    Danitza Reed Rep   09/25/23 11:55 CDT

## 2023-10-04 ENCOUNTER — OFFICE VISIT (OUTPATIENT)
Dept: FAMILY MEDICINE CLINIC | Facility: CLINIC | Age: 48
End: 2023-10-04
Payer: COMMERCIAL

## 2023-10-04 VITALS
DIASTOLIC BLOOD PRESSURE: 84 MMHG | HEART RATE: 111 BPM | WEIGHT: 315 LBS | TEMPERATURE: 97.3 F | RESPIRATION RATE: 18 BRPM | BODY MASS INDEX: 40.43 KG/M2 | SYSTOLIC BLOOD PRESSURE: 130 MMHG | HEIGHT: 74 IN | OXYGEN SATURATION: 99 %

## 2023-10-04 DIAGNOSIS — N28.9 RENAL INSUFFICIENCY: ICD-10-CM

## 2023-10-04 DIAGNOSIS — D64.9 ANEMIA, UNSPECIFIED TYPE: ICD-10-CM

## 2023-10-04 DIAGNOSIS — E78.2 MIXED HYPERLIPIDEMIA: Chronic | ICD-10-CM

## 2023-10-04 DIAGNOSIS — E11.65 UNCONTROLLED TYPE 2 DIABETES MELLITUS WITH HYPERGLYCEMIA: ICD-10-CM

## 2023-10-04 DIAGNOSIS — I10 PRIMARY HYPERTENSION: Chronic | ICD-10-CM

## 2023-10-04 DIAGNOSIS — R69 MULTIPLE CHRONIC DISEASES: ICD-10-CM

## 2023-10-04 DIAGNOSIS — R74.8 LIVER ENZYME ELEVATION: ICD-10-CM

## 2023-10-04 NOTE — PROGRESS NOTES
VERONIKA”.   Subjective   Hank Hanna is a 48 y.o. male presenting with chief complaint of:   Chief Complaint   Patient presents with    Follow-up     AWV NA  Last Completed Annual Wellness Visit       This patient has no relevant Health Maintenance data.             History of Present Illness :  Alone.   Here for review of chronic problems that includes now uncontrolled DM and others.    Has multiple chronic problems to consider that might have a bearing on today's issues;  an interval appointment.       Chronic/acute problems reviewed today:   1. Uncontrolled type 2 diabetes mellitus with hyperglycemia chronic and much worse.  EMS understood the Ozempic autoinjector and has not been giving by less than 0.25 dosing.   2. Anemia, unspecified type Chronic or past history/stable along while: This has been present before.    There has been GI evaulation in the past. There is no current melena, hematochezia. It has been benign to date and stable/watching.  Contributing comorbidities to date: .    EGD-neg/LIZZY/Manjit/10.23.20/prn  Colon-p/Manjit/LIZZY/10.23.20/3y  M2-missed small part/10.2020    SURGERIES:  LS Spine/[place]/2006  L arthroscopic/[facility]/[place]/7-5-11  R arthroscopic/[facility]/[place]/7.25.11    Hb 12.8L 7.22.20; 13.5 10.2.23  Iron 68N 2.2.21  Ferritin none  B12 none  Folate none  Retic none  OB 2/3+ 7.2020       3. Renal insufficiency Chronic/stable.  No nephrology.  No dysuria.  Previously warned/reminded:   To avoid further kidney function decline  A. Treat any time you think you have infection  B. Stay hydrated (dont get dehydrated); drink at least 60 oz fluid every 24 hr (1800 cc or nearly a 2L)  C. Do not allow any xrays with dye WITHOUT the doctor ordering checking your renal function  D. Do not get new medications without the doctor considering your renal condition  E. Do not use motrin/ibuprofen, alleve/naprosyn and these types of medications     4. Liver enzyme elevation chronic elevation of liver  enzymes; most likely etiology fatty liver   5. Mixed hyperlipidemia Chronic/stable.  Tolerated use of Rx with labs showing improved lipid values and tolerant liver labs. No muscle aches unexpected.      6. Primary hypertension Chronic/stable. Stable here past/no recent home blood pressures.  No significant chest pain, SOB, LE edema, orthopnea, near syncope, dizziness/light headness.   Recent Vitals         12/22/2022 7/17/2023 10/4/2023       BP: 134/82 140/86 130/84     Pulse: 87 76 111     Temp: -- 97.1 °F (36.2 °C) 97.3 °F (36.3 °C)     Weight: 164 kg (361 lb) 160 kg (352 lb) 160 kg (353 lb)     BMI (Calculated): 46.3 45.2 45.3              7. Multiple chronic diseases Has multiple chronic problems with increased risks for management (involves polypharmacy, many required labs/imaging/ to manage)       Has an/another acute issue today: none.    The following portions of the patient's history were reviewed and updated as appropriate: allergies, current medications, past family history, past medical history, past social history, past surgical history, and problem list.      Current Outpatient Medications:     albuterol sulfate  (90 Base) MCG/ACT inhaler, INHALE 2 PUFFS BY MOUTH 4 TIMES A DAY AS NEEDED FOR WHEEZING, Disp: 6.7 g, Rfl: 1    atorvastatin (LIPITOR) 20 MG tablet, TAKE 1 TABLET BY MOUTH EVERY DAY AT NIGHT, Disp: 90 tablet, Rfl: 3    Blood Glucose Monitoring Suppl (Accu-Chek Guide Me) w/Device kit, 1 Device 4 (Four) Times a Day Before Meals & at Bedtime., Disp: 1 kit, Rfl: 0    Blood Glucose Monitoring Suppl (B-D LOGIC BLOOD GLUCOSE) w/Device kit, 1 each Every Morning Before Breakfast. And once randomly during the day., Disp: 1 each, Rfl: 0    diclofenac (VOLTAREN) 75 MG EC tablet, Take 1 tablet by mouth 2 (Two) Times a Day., Disp: 180 tablet, Rfl: 0    esomeprazole (nexIUM) 40 MG capsule, Take 1 capsule by mouth Every Morning Before Breakfast., Disp: 90 capsule, Rfl: 0    glucose blood (Accu-Chek  "Guide) test strip, Test every four hours while awake, bring in reading 24 hours, then test AC and HS, Disp: 200 each, Rfl: 5    Insulin Pen Needle (BD Pen Needle Cesia U/F) 32G X 4 MM misc, Use 1 each 1 (One) Time Per Week., Disp: 50 each, Rfl: 0    losartan (COZAAR) 100 MG tablet, Take 1 tablet by mouth Daily., Disp: 90 tablet, Rfl: 3    metoprolol succinate XL (TOPROL-XL) 100 MG 24 hr tablet, Take 1 tablet by mouth Daily., Disp: 90 tablet, Rfl: 3    Multiple Vitamins-Minerals (MULTI-VITAMIN GUMMIES) chewable tablet, Chew 2 (Two) Times a Day., Disp: , Rfl:     Nitric Acid liquid, For exercise two tablets daily, Disp: , Rfl:     ONETOUCH DELICA LANCETS FINE misc, Use 1 prior to breakfast and then 1 randomly during the day., Disp: 100 each, Rfl: 2    Semaglutide, 1 MG/DOSE, (Ozempic, 1 MG/DOSE,) 4 MG/3ML solution pen-injector, Inject 1 mg under the skin into the appropriate area as directed 1 (One) Time Per Week., Disp: 3 mL, Rfl: 5    Semaglutide,0.25 or 0.5MG/DOS, (Ozempic, 0.25 or 0.5 MG/DOSE,) 2 MG/3ML solution pen-injector, INJECT 0.5 MG UNDER THE SKIN INTO THE APPROPRIATE AREA AS DIRECTED 1 (ONE) TIME PER WEEK FOR 14 DAYS., Disp: 3 mL, Rfl: 3    tadalafil (CIALIS) 20 MG tablet, Take 1 tablet by mouth Daily As Needed for Erectile Dysfunction., Disp: , Rfl:       Current Outpatient Medications:     Semaglutide, 1 MG/DOSE, (Ozempic, 1 MG/DOSE,) 4 MG/3ML solution pen-injector, Inject 1 mg under the skin into the appropriate area as directed 1 (One) Time Per Week., Disp: 3 mL, Rfl: 5    Semaglutide,0.25 or 0.5MG/DOS, (Ozempic, 0.25 or 0.5 MG/DOSE,) 2 MG/3ML solution pen-injector, INJECT 0.5 MG UNDER THE SKIN INTO THE APPROPRIATE AREA AS DIRECTED 1 (ONE) TIME PER WEEK FOR 14 DAYS., Disp: 3 mL, Rfl: 3    No problems with medications.    Allergies   Allergen Reactions    Hydrochlorothiazide Rash    Lisinopril Cough     \"made me cough\"    Norvasc [Amlodipine] Other (See Comments)     Gum issue       Review of " Systems  GENERAL:  Active/slower with limits, speed, samni for age. Sleeps ok. No fever.  ENDO:  No syncope, near or diaphoretic sweaty spells.    HEENT: No head injury or headache.   No vision change; with glasses..  No hearing loss/pain/drainage.  No sore throat.  No nasal/sinus congestion/drainage. No epistaxis.  CHEST: No chest wall tenderness or mass. No cough, wheeze, hemoptysis; mild SOB exertion.  CV: No chest pain, palpatations; same occ/mild ankle edema.  GI: No heartburn, dysphagia, abdominal pain, diarrhea, constipation, rectal bleeding, or melena.  :  Voids without dysuria, or incontience to completion.  ORTHO: No painful/swollen joints but various on /off sore especially knees.   No sore neck or back.  No acute neck or back pain without recent injury.   NEURO: No dizziness, weakness of extremities.  No numbness/parethesias.   PSYCH: No memory loss.  Mood good; not anxious, depressed or suicidal.  Tolerated stress.      Screening:  Mammogram: NA  Bone density: NA  Low dose CT chest: Tobacco-smoker/age 14/<1 ppd/dc 12.1.2013 (22)-NA  GI:   EGD-neg/LIZZY/Manjit/10.23.20/prn  Colon-p/Manjit/LIZZY/10.23.20/3y-reminded  M2-missed small part/10.2020  Prostate: 7.17.23; voiding ok  Usual lab order  6m BMP, A1c  12m CBC, CMP, A1c, LIPID    Copy/paste function used for ROS/exam AND each area of these were reviewed, updated, confirmed and supplemented as needed.  Data reviewed:   Recent admit/ER/MD visits: 7.17.23    1. Liver enzyme elevation    2. Gastroesophageal reflux disease, unspecified whether esophagitis present    3. Controlled type 2 diabetes mellitus with diabetic nephropathy, unspecified whether long term insulin use    4. Mixed hyperlipidemia    5. Primary hypertension    6. Renal insufficiency    7. Osteoarthritis, unspecified osteoarthritis type, unspecified site    8. Leg edema    9. Multiple chronic diseases          Data review above:   Discussions/medical decisions/reviews:  BP borderline here;  lower home  Other vitals weight down; good        DM/A1c 6.6 3.22.23  DM/ 3.22.23  Lipid LDL 83 9.14.22; lipitor 20  PSA ok 9.14.22  CBC ok 9.14.22  Renal ok 3.22.23  Liver ALT 54 3.22.23; better  Vit D sometime  Thyroid TSH, fT4 ok 9.14.22     Screening reviewed/updated   Dont want him to stop GLP1; hoping there are formulary issues that can be worked out    Last cardiac testing:   Echo:     Radiology considered:   No radiology results for the last 90 days.    Lab Results:  Results for orders placed or performed in visit on 10/02/23   PSA Screen    Specimen: Blood   Result Value Ref Range    PSA 1.670 0.000 - 4.000 ng/mL   Comprehensive Metabolic Panel    Specimen: Blood   Result Value Ref Range    Glucose 173 (H) 65 - 99 mg/dL    BUN 16 6 - 20 mg/dL    Creatinine 1.24 0.76 - 1.27 mg/dL    EGFR Result 70.8 >60.0 mL/min/1.73    BUN/Creatinine Ratio 12.9 7.0 - 25.0    Sodium 140 136 - 145 mmol/L    Potassium 4.9 3.5 - 5.2 mmol/L    Chloride 101 98 - 107 mmol/L    Total CO2 27.8 22.0 - 29.0 mmol/L    Calcium 10.1 8.6 - 10.5 mg/dL    Total Protein 7.0 6.0 - 8.5 g/dL    Albumin 4.6 3.5 - 5.2 g/dL    Globulin 2.4 gm/dL    A/G Ratio 1.9 g/dL    Total Bilirubin 0.8 0.0 - 1.2 mg/dL    Alkaline Phosphatase 111 39 - 117 U/L    AST (SGOT) 79 (H) 1 - 40 U/L    ALT (SGPT) 154 (H) 1 - 41 U/L   Hemoglobin A1c    Specimen: Blood   Result Value Ref Range    Hemoglobin A1C 10.80 (H) 4.80 - 5.60 %   Lipid Panel With / Chol / HDL Ratio    Specimen: Blood   Result Value Ref Range    Total Cholesterol 269 (H) 0 - 200 mg/dL    Triglycerides 151 (H) 0 - 150 mg/dL    HDL Cholesterol 41 40 - 60 mg/dL    VLDL Cholesterol Balta 28 5 - 40 mg/dL    LDL Chol Calc (NIH) 200 (H) 0 - 100 mg/dL    Chol/HDL Ratio 6.56    Uric Acid    Specimen: Blood   Result Value Ref Range    Uric Acid 5.5 3.4 - 7.0 mg/dL   CBC & Differential    Specimen: Blood   Result Value Ref Range    WBC 7.54 3.40 - 10.80 10*3/mm3    RBC 4.85 4.14 - 5.80 10*6/mm3     Hemoglobin 13.5 13.0 - 17.7 g/dL    Hematocrit 41.2 37.5 - 51.0 %    MCV 84.9 79.0 - 97.0 fL    MCH 27.8 26.6 - 33.0 pg    MCHC 32.8 31.5 - 35.7 g/dL    RDW 13.5 12.3 - 15.4 %    Platelets 278 140 - 450 10*3/mm3    Neutrophil Rel % 68.1 42.7 - 76.0 %    Lymphocyte Rel % 18.8 (L) 19.6 - 45.3 %    Monocyte Rel % 9.8 5.0 - 12.0 %    Eosinophil Rel % 1.7 0.3 - 6.2 %    Basophil Rel % 0.9 0.0 - 1.5 %    Neutrophils Absolute 5.13 1.70 - 7.00 10*3/mm3    Lymphocytes Absolute 1.42 0.70 - 3.10 10*3/mm3    Monocytes Absolute 0.74 0.10 - 0.90 10*3/mm3    Eosinophils Absolute 0.13 0.00 - 0.40 10*3/mm3    Basophils Absolute 0.07 0.00 - 0.20 10*3/mm3    Immature Granulocyte Rel % 0.7 (H) 0.0 - 0.5 %    Immature Grans Absolute 0.05 0.00 - 0.05 10*3/mm3    nRBC 0.0 0.0 - 0.2 /100 WBC       A1C:  Lab Results - Last 18 Months   Lab Units 10/02/23  0916 03/22/23  0734 12/21/22  1341 09/14/22  0959   HEMOGLOBIN A1C % 10.80* 6.60* 7.00* 12.4*     GLUCOSE:  Lab Results - Last 18 Months   Lab Units 10/02/23  0916 03/22/23  0734 12/21/22  1341 09/14/22  0959   GLUCOSE mg/dL 173* 127* 117* 416*     LIPID:  Lab Results - Last 18 Months   Lab Units 10/02/23  0916 09/14/22  0959   CHOLESTEROL mg/dL 269*  --    LDL CHOL mg/dL 200* 83   HDL CHOL mg/dL 41 31*   TRIGLYCERIDES mg/dL 151* 504*     PSA:  Lab Results   Component Value Date/Time    PSA 1.670 10/02/2023 09:16 AM    PSA 1.790 09/14/2022 09:59 AM       CBC:  Lab Results - Last 18 Months   Lab Units 10/02/23  0916 09/14/22  0959   WBC 10*3/mm3 7.54 7.70   HEMOGLOBIN g/dL 13.5 13.3   HEMATOCRIT % 41.2 40.6   PLATELETS 10*3/mm3 278 248     BMP/CMP:  Lab Results - Last 18 Months   Lab Units 10/02/23  0916 03/22/23  0734 12/21/22  1341 09/14/22  0959   SODIUM mmol/L 140 143 142 133*   POTASSIUM mmol/L 4.9 4.4 4.0 4.5   CHLORIDE mmol/L 101 105 106 94*   CO2 mmol/L 27.8 26.3 25.0 21.0*   GLUCOSE mg/dL 173* 127* 117* 416*   BUN mg/dL 16 18 16 17   CREATININE mg/dL 1.24 1.41* 1.65* 1.27   EGFR  "RESULT mL/min/1.73 70.8 60.7 50.6*  --    CALCIUM mg/dL 10.1 9.2 9.4 9.8   URIC ACID mg/dL 5.5  --   --   --        HEPATIC:  Lab Results - Last 18 Months   Lab Units 10/02/23  0916 03/22/23  0734 09/14/22  0959   ALT (SGPT) U/L 154* 54* 127*   AST (SGOT) U/L 79* 32 106*   ALK PHOS U/L 111 93 146*     HEPATITIS C ANTIBODY: No results found for: HEPCVIRUSABY  Vit D:No results for input(s): UUED01VD in the last 34114 hours.  THYROID:  Lab Results - Last 18 Months   Lab Units 09/14/22  0959   TSH uIU/mL 0.454   FREE T4 ng/dL 1.61       Objective   /84   Pulse 111   Temp 97.3 °F (36.3 °C) (Temporal)   Resp 18   Ht 188 cm (74\")   Wt (!) 160 kg (353 lb)   SpO2 99%   BMI 45.32 kg/m²   Body mass index is 45.32 kg/m².    Recent Vitals         12/22/2022 7/17/2023 10/4/2023       BP: 134/82 140/86 130/84     Pulse: 87 76 111     Temp: -- 97.1 °F (36.2 °C) 97.3 °F (36.3 °C)     Weight: 164 kg (361 lb) 160 kg (352 lb) 160 kg (353 lb)     BMI (Calculated): 46.3 45.2 45.3           Wt Readings from Last 15 Encounters:   10/04/23 0832 (!) 160 kg (353 lb)   07/17/23 1115 (!) 160 kg (352 lb)   12/22/22 1309 (!) 164 kg (361 lb)   09/26/22 1142 (!) 163 kg (360 lb 3.2 oz)   09/15/22 1507 (!) 161 kg (356 lb)   09/14/22 0836 (!) 162 kg (356 lb 9.6 oz)   01/24/22 0840 (!) 159 kg (350 lb)   02/02/21 1306 (!) 159 kg (350 lb)   10/23/20 0928 (!) 155 kg (342 lb)   09/10/20 1242 (!) 158 kg (349 lb)   07/22/20 1308 (!) 160 kg (351 lb 12.8 oz)   12/31/19 1147 (!) 155 kg (342 lb)   10/07/19 1428 (!) 156 kg (343 lb)   12/11/18 0856 (!) 147 kg (325 lb)   01/20/17 1453 (!) 145 kg (319 lb)       Physical Exam  GENERAL:  Well nourished/developed  in no acute distress. Obese.   SKIN: Turgor excellent, without wound, rash, lesion.   HEENT: Normal cephalic without trauma.  Pupils equal round reactive to light. Extraocular motions full without nystagmus.   .  No thyroidmegaly, mass, tenderness, lymphadenopathy and supple.   CV: Regular " rhythm.  No murmur, gallop, edema. Posterior pulses intact.  No carotid bruits.   CHEST: No chest wall tenderness or mass.   LUNGS: Symmetric motion with clear to auscultation.  No dullness to percussion.   ABD: Soft, nontender without mass.   ORTHO: Symmetric extremities without swelling/point tenderness.  Full gross range of motion.    NEURO: CN 2-12 grossly intact.  Symmetric facies. 2/4 x 4 biceps, knees equal reflexes.  UE/LE   4-5/5 strength throughout.  Nonfocal use extremities. Speech clear. Intact light touch with monofilament, vibratory sensation with tuning fork; equal toes/distal feet.    PSYCH: Oriented x 3.  Pleasant calm, well kept.  Purposeful/directed conservation with intact short/long gross memory.        Assessment & Plan     1. Uncontrolled type 2 diabetes mellitus with hyperglycemia    2. Anemia, unspecified type    3. Renal insufficiency    4. Liver enzyme elevation    5. Mixed hyperlipidemia    6. Primary hypertension    7. Multiple chronic diseases        Data review above:   Discussions/medical decisions/reviews:  BP ok  Other vitals ok  Recent Vitals         12/22/2022 7/17/2023 10/4/2023       BP: 134/82 140/86 130/84     Pulse: 87 76 111     Temp: -- 97.1 °F (36.2 °C) 97.3 °F (36.3 °C)     Weight: 164 kg (361 lb) 160 kg (352 lb) 160 kg (353 lb)     BMI (Calculated): 46.3 45.2 45.3           DM/A1c 10.8 10.2.23  DM/ 10.2.23  Lipid  10.2.23; lipitor 20  PSA ok 10.2.23  CBC ok 10.2.23  Renal ok 10.2.23  Liver ALT, /79 10.2.23; worse  Vit D sometime  Thyroid TSH, fT4 ok 9.14.22    Screening reviewed/updated   Vaccines discussed (see below)   A1c 2m  0.25 for 1-2weeks then 0.5 for 1-2weeks and 1.0 mg  He prefers to not take metformin    Follow up: Return for lab 2m then lab/Dr Saxena 6m.  Future Appointments   Date Time Provider Department Center   5/6/2024  9:45 AM Rajan Saxena MD [facility]       Data review above:   Rx: reviewed and decisions:   Rx  "new/changes: correct/higher dose ozempic  No orders of the defined types were placed in this encounter.      Orders placed:   LAB/Testing/Referrals: reviewed/orders:   Today:   No orders of the defined types were placed in this encounter.    Chronic/recurrent labs above or change to:   Same       There is no immunization history on file for this patient.  We advised/reaffirmed our support/suggestion for staying complete with covid- covid boosters, seasonal flu/yearly and any missing vaccine from list we supplied; when cannot be given here we suggest contact with local health department office or pharmacy to review missing/needed vaccines and then bring nursing documentation for these vaccines to this office or call this information in. Shingles became \"free\" 1.1.23 for medicare insurance.    Health maintenance:   Body mass index is 45.32 kg/m².         Tobacco use reviewed:   Hank Hanna  reports that he quit smoking about 10 years ago. His smoking use included cigarettes. He started smoking about 34 years ago. He has a 18.75 pack-year smoking history. He quit smokeless tobacco use about 33 years ago.  His smokeless tobacco use included chew..   There are no Patient Instructions on file for this visit.          "

## 2023-10-11 ENCOUNTER — TELEPHONE (OUTPATIENT)
Dept: FAMILY MEDICINE CLINIC | Facility: CLINIC | Age: 48
End: 2023-10-11

## 2023-10-11 NOTE — TELEPHONE ENCOUNTER
Caller: Hank Hanna    Relationship: Self    Best call back number: [phone]    What is the best time to reach you: ANYTIME    Who are you requesting to speak with (clinical staff, provider,  specific staff member): CLINICAL    What was the call regarding: NEEDING ASSISTANCE WITH OZEMPIC PRESCRIPTION, STATES THAT INSURANCE WILL NOT COVER AMOUNT THAT IS NEEDED FOR TRIP ON BARGE, WILL NOT LAST TIME. WANTING TO SEEK OTHER OPTIONS.

## 2023-10-18 DIAGNOSIS — K21.9 GASTROESOPHAGEAL REFLUX DISEASE, UNSPECIFIED WHETHER ESOPHAGITIS PRESENT: ICD-10-CM

## 2023-10-18 DIAGNOSIS — Z79.4 INSULIN DEPENDENT TYPE 2 DIABETES MELLITUS: ICD-10-CM

## 2023-10-18 DIAGNOSIS — E11.65 UNCONTROLLED TYPE 2 DIABETES MELLITUS WITH HYPERGLYCEMIA: ICD-10-CM

## 2023-10-18 DIAGNOSIS — U09.9 POST-COVID CHRONIC COUGH: ICD-10-CM

## 2023-10-18 DIAGNOSIS — R05.3 POST-COVID CHRONIC COUGH: ICD-10-CM

## 2023-10-18 DIAGNOSIS — E11.9 INSULIN DEPENDENT TYPE 2 DIABETES MELLITUS: ICD-10-CM

## 2023-10-18 RX ORDER — ESOMEPRAZOLE MAGNESIUM 40 MG/1
40 CAPSULE, DELAYED RELEASE ORAL
Qty: 90 CAPSULE | Refills: 0 | Status: SHIPPED | OUTPATIENT
Start: 2023-10-18

## 2023-10-18 RX ORDER — ALBUTEROL SULFATE 90 UG/1
AEROSOL, METERED RESPIRATORY (INHALATION)
Qty: 6.7 G | Refills: 0 | Status: SHIPPED | OUTPATIENT
Start: 2023-10-18

## 2023-10-18 RX ORDER — INSULIN ASPART 100 [IU]/ML
INJECTION, SOLUTION INTRAVENOUS; SUBCUTANEOUS
Qty: 3 ML | Refills: 5 | Status: SHIPPED | OUTPATIENT
Start: 2023-10-18

## 2023-10-18 RX ORDER — DICLOFENAC SODIUM 75 MG/1
75 TABLET, DELAYED RELEASE ORAL 2 TIMES DAILY
Qty: 180 TABLET | Refills: 0 | Status: SHIPPED | OUTPATIENT
Start: 2023-10-18

## 2023-11-08 ENCOUNTER — TELEPHONE (OUTPATIENT)
Dept: FAMILY MEDICINE CLINIC | Facility: CLINIC | Age: 48
End: 2023-11-08
Payer: COMMERCIAL

## 2023-11-08 DIAGNOSIS — E11.65 UNCONTROLLED TYPE 2 DIABETES MELLITUS WITH HYPERGLYCEMIA: Primary | ICD-10-CM

## 2023-11-08 RX ORDER — SEMAGLUTIDE 2.68 MG/ML
2 INJECTION, SOLUTION SUBCUTANEOUS WEEKLY
Qty: 3 ML | Refills: 0 | Status: SHIPPED | OUTPATIENT
Start: 2023-11-08

## 2023-11-08 RX ORDER — SEMAGLUTIDE 1.34 MG/ML
2 INJECTION, SOLUTION SUBCUTANEOUS WEEKLY
Qty: 3 ML | Refills: 5 | Status: SHIPPED | OUTPATIENT
Start: 2023-11-08

## 2023-11-08 NOTE — TELEPHONE ENCOUNTER
Patient would like to see if he can raise the Ozempic 2mg or another medication because the Ozempic isn't working like the trulicity by keeping sugars down and insurance is not paying for trulicity

## 2023-11-08 NOTE — TELEPHONE ENCOUNTER
Ok to increase - f/u before next f/u if after a week or 2 doesn't come down    Electronically signed by CHLOE Lan, 11/08/23, 2:58 PM CST.

## 2023-11-13 ENCOUNTER — TELEPHONE (OUTPATIENT)
Dept: FAMILY MEDICINE CLINIC | Facility: CLINIC | Age: 48
End: 2023-11-13
Payer: COMMERCIAL

## 2023-11-13 DIAGNOSIS — E11.21 CONTROLLED TYPE 2 DIABETES MELLITUS WITH DIABETIC NEPHROPATHY, UNSPECIFIED WHETHER LONG TERM INSULIN USE: Primary | ICD-10-CM

## 2023-11-13 DIAGNOSIS — E78.5 HYPERLIPIDEMIA, UNSPECIFIED HYPERLIPIDEMIA TYPE: ICD-10-CM

## 2023-11-13 DIAGNOSIS — U09.9 POST-COVID CHRONIC COUGH: ICD-10-CM

## 2023-11-13 DIAGNOSIS — R05.3 POST-COVID CHRONIC COUGH: ICD-10-CM

## 2023-11-13 RX ORDER — ALBUTEROL SULFATE 90 UG/1
AEROSOL, METERED RESPIRATORY (INHALATION)
Qty: 6.7 G | Refills: 3 | Status: SHIPPED | OUTPATIENT
Start: 2023-11-13

## 2023-11-13 RX ORDER — TIRZEPATIDE 5 MG/.5ML
5 INJECTION, SOLUTION SUBCUTANEOUS WEEKLY
Qty: 4 ML | Refills: 5 | Status: SHIPPED | OUTPATIENT
Start: 2023-11-13 | End: 2023-11-14 | Stop reason: SDUPTHER

## 2023-11-13 RX ORDER — ATORVASTATIN CALCIUM 20 MG/1
TABLET, FILM COATED ORAL
Qty: 90 TABLET | Refills: 3 | Status: SHIPPED | OUTPATIENT
Start: 2023-11-13

## 2023-11-13 NOTE — TELEPHONE ENCOUNTER
Patient has been made aware after calling to let us know that mounjaro and trulicity is covered but mounjaro is the only one in stock, I let him know medications have been sent

## 2023-11-13 NOTE — TELEPHONE ENCOUNTER
Caller: Hank Hanna    Relationship to patient: Self    Best call back number: [phone]     PATIENT IS UNABLE TO GET THE OZEMPIC, HE HAS CALLED EVERYWHERE. NO WHERE HAS IT. WANTS TO KNOW IF THERE IS AN ALTERNATIVE HE CAN TRY. HE WAS DUE FOR THE INJECTION TODAY. HE HAS SOME FAST ACTING LEFT OVER INSULIN WANTS TO KNOW IF HE NEEDS TO USE THIS INSTEAD.    HIS BLOOD SUGAR AS OF 11:05AM 11/13/2023 IS 84

## 2023-11-13 NOTE — TELEPHONE ENCOUNTER
Have him try to change to    Tirzepatide (Mounjaro) 5 MG/0.5ML solution pen-injector, Inject 0.5 mL under the skin into the appropriate area as directed 1 (One) Time Per Week., Disp: 4 mL, Rfl: 5  (Sent to [facility] by [facility])  If not able to get let us know today      Was on    Semaglutide, 1 MG/DOSE, (Ozempic, 1 MG/DOSE,) 4 MG/3ML solution pen-injector, Inject 2 mg under the skin into the appropriate area as directed 1 (One) Time Per Week., Disp: 3 mL, Rfl: 5    Semaglutide, 2 MG/DOSE, (Ozempic, 2 MG/DOSE,) 8 MG/3ML solution pen-injector, Inject 2 mg under the skin into the appropriate area as directed 1 (One) Time Per Week., Disp: 3 mL, Rfl: 0

## 2023-11-14 ENCOUNTER — TELEPHONE (OUTPATIENT)
Dept: FAMILY MEDICINE CLINIC | Facility: CLINIC | Age: 48
End: 2023-11-14
Payer: COMMERCIAL

## 2023-11-14 DIAGNOSIS — E11.21 CONTROLLED TYPE 2 DIABETES MELLITUS WITH DIABETIC NEPHROPATHY, UNSPECIFIED WHETHER LONG TERM INSULIN USE: ICD-10-CM

## 2023-11-14 RX ORDER — TIRZEPATIDE 5 MG/.5ML
5 INJECTION, SOLUTION SUBCUTANEOUS WEEKLY
Qty: 6 ML | Refills: 0 | Status: SHIPPED | OUTPATIENT
Start: 2023-11-14

## 2023-11-14 NOTE — TELEPHONE ENCOUNTER
Patient called and stated that he needed a 3 month supply not a one month, I received permission from RO to change and sent to pharmacy

## 2024-04-09 RX ORDER — DICLOFENAC SODIUM 75 MG/1
75 TABLET, DELAYED RELEASE ORAL 2 TIMES DAILY
Qty: 180 TABLET | Refills: 0 | Status: SHIPPED | OUTPATIENT
Start: 2024-04-09

## 2024-04-09 NOTE — TELEPHONE ENCOUNTER
Rx Refill Note  Requested Prescriptions     Pending Prescriptions Disp Refills    diclofenac (VOLTAREN) 75 MG EC tablet [Pharmacy Med Name: DICLOFENAC SOD EC 75 MG TAB] 180 tablet 0     Sig: TAKE 1 TABLET BY MOUTH TWICE A DAY      Last office visit with prescribing clinician: 10/4/2023   Last telemedicine visit with prescribing clinician: Visit date not found   Next office visit with prescribing clinician: 5/6/2024     Jared Mcdonald MA  04/09/24, 09:27 CDT

## 2024-05-06 ENCOUNTER — OFFICE VISIT (OUTPATIENT)
Dept: FAMILY MEDICINE CLINIC | Facility: CLINIC | Age: 49
End: 2024-05-06
Payer: COMMERCIAL

## 2024-05-06 VITALS
OXYGEN SATURATION: 97 % | HEART RATE: 81 BPM | TEMPERATURE: 97.1 F | WEIGHT: 315 LBS | SYSTOLIC BLOOD PRESSURE: 136 MMHG | BODY MASS INDEX: 40.43 KG/M2 | RESPIRATION RATE: 18 BRPM | HEIGHT: 74 IN | DIASTOLIC BLOOD PRESSURE: 94 MMHG

## 2024-05-06 DIAGNOSIS — Z79.1 NSAID LONG-TERM USE: ICD-10-CM

## 2024-05-06 DIAGNOSIS — K21.9 GASTROESOPHAGEAL REFLUX DISEASE, UNSPECIFIED WHETHER ESOPHAGITIS PRESENT: ICD-10-CM

## 2024-05-06 DIAGNOSIS — R69 MULTIPLE CHRONIC DISEASES: ICD-10-CM

## 2024-05-06 DIAGNOSIS — E11.65 UNCONTROLLED TYPE 2 DIABETES MELLITUS WITH HYPERGLYCEMIA: ICD-10-CM

## 2024-05-06 DIAGNOSIS — I10 PRIMARY HYPERTENSION: Chronic | ICD-10-CM

## 2024-05-06 DIAGNOSIS — D64.9 ANEMIA, UNSPECIFIED TYPE: ICD-10-CM

## 2024-05-06 DIAGNOSIS — R74.8 LIVER ENZYME ELEVATION: ICD-10-CM

## 2024-05-06 DIAGNOSIS — K63.5 POLYP OF COLON, UNSPECIFIED PART OF COLON, UNSPECIFIED TYPE: ICD-10-CM

## 2024-05-06 DIAGNOSIS — N52.9 ERECTILE DYSFUNCTION, UNSPECIFIED ERECTILE DYSFUNCTION TYPE: ICD-10-CM

## 2024-05-06 DIAGNOSIS — M19.90 OSTEOARTHRITIS, UNSPECIFIED OSTEOARTHRITIS TYPE, UNSPECIFIED SITE: ICD-10-CM

## 2024-05-06 DIAGNOSIS — E78.5 HYPERLIPIDEMIA, UNSPECIFIED HYPERLIPIDEMIA TYPE: ICD-10-CM

## 2024-05-06 DIAGNOSIS — K59.03 DRUG-INDUCED CONSTIPATION: ICD-10-CM

## 2024-05-06 PROCEDURE — 99214 OFFICE O/P EST MOD 30 MIN: CPT | Performed by: FAMILY MEDICINE

## 2024-05-07 ENCOUNTER — TELEPHONE (OUTPATIENT)
Dept: FAMILY MEDICINE CLINIC | Facility: CLINIC | Age: 49
End: 2024-05-07
Payer: COMMERCIAL

## 2024-05-07 DIAGNOSIS — N28.9 RENAL INSUFFICIENCY: ICD-10-CM

## 2024-05-07 DIAGNOSIS — R94.4 DECREASED GFR: Primary | ICD-10-CM

## 2024-05-07 LAB
ALBUMIN SERPL-MCNC: 4.3 G/DL (ref 3.5–5.2)
ALBUMIN/GLOB SERPL: 1.7 G/DL
ALP SERPL-CCNC: 118 U/L (ref 39–117)
ALT SERPL-CCNC: 61 U/L (ref 1–41)
AST SERPL-CCNC: 43 U/L (ref 1–40)
BASOPHILS # BLD AUTO: 0.06 10*3/MM3 (ref 0–0.2)
BASOPHILS NFR BLD AUTO: 0.6 % (ref 0–1.5)
BILIRUB SERPL-MCNC: 0.9 MG/DL (ref 0–1.2)
BUN SERPL-MCNC: 29 MG/DL (ref 6–20)
BUN/CREAT SERPL: 9.3 (ref 7–25)
CALCIUM SERPL-MCNC: 9.1 MG/DL (ref 8.6–10.5)
CHLORIDE SERPL-SCNC: 102 MMOL/L (ref 98–107)
CHOLEST SERPL-MCNC: 179 MG/DL (ref 0–200)
CO2 SERPL-SCNC: 22.9 MMOL/L (ref 22–29)
CREAT SERPL-MCNC: 3.11 MG/DL (ref 0.76–1.27)
EGFRCR SERPLBLD CKD-EPI 2021: 23.4 ML/MIN/1.73
EOSINOPHIL # BLD AUTO: 0.09 10*3/MM3 (ref 0–0.4)
EOSINOPHIL NFR BLD AUTO: 0.9 % (ref 0.3–6.2)
ERYTHROCYTE [DISTWIDTH] IN BLOOD BY AUTOMATED COUNT: 13.6 % (ref 12.3–15.4)
FERRITIN SERPL-MCNC: 173 NG/ML (ref 30–400)
GLOBULIN SER CALC-MCNC: 2.6 GM/DL
GLUCOSE SERPL-MCNC: 115 MG/DL (ref 65–99)
HBA1C MFR BLD: 6.8 % (ref 4.8–5.6)
HCT VFR BLD AUTO: 40 % (ref 37.5–51)
HDLC SERPL-MCNC: 46 MG/DL (ref 40–60)
HGB BLD-MCNC: 13 G/DL (ref 13–17.7)
IMM GRANULOCYTES # BLD AUTO: 0.06 10*3/MM3 (ref 0–0.05)
IMM GRANULOCYTES NFR BLD AUTO: 0.6 % (ref 0–0.5)
IRON SATN MFR SERPL: 30 % (ref 20–50)
IRON SERPL-MCNC: 111 MCG/DL (ref 59–158)
LDLC SERPL CALC-MCNC: 102 MG/DL (ref 0–100)
LYMPHOCYTES # BLD AUTO: 1.25 10*3/MM3 (ref 0.7–3.1)
LYMPHOCYTES NFR BLD AUTO: 13.1 % (ref 19.6–45.3)
MCH RBC QN AUTO: 27.3 PG (ref 26.6–33)
MCHC RBC AUTO-ENTMCNC: 32.5 G/DL (ref 31.5–35.7)
MCV RBC AUTO: 84 FL (ref 79–97)
MONOCYTES # BLD AUTO: 0.91 10*3/MM3 (ref 0.1–0.9)
MONOCYTES NFR BLD AUTO: 9.5 % (ref 5–12)
NEUTROPHILS # BLD AUTO: 7.18 10*3/MM3 (ref 1.7–7)
NEUTROPHILS NFR BLD AUTO: 75.3 % (ref 42.7–76)
NRBC BLD AUTO-RTO: 0 /100 WBC (ref 0–0.2)
PLATELET # BLD AUTO: 276 10*3/MM3 (ref 140–450)
POTASSIUM SERPL-SCNC: 4.6 MMOL/L (ref 3.5–5.2)
PROT SERPL-MCNC: 6.9 G/DL (ref 6–8.5)
RBC # BLD AUTO: 4.76 10*6/MM3 (ref 4.14–5.8)
SODIUM SERPL-SCNC: 139 MMOL/L (ref 136–145)
TIBC SERPL-MCNC: 368 MCG/DL
TRIGL SERPL-MCNC: 181 MG/DL (ref 0–150)
UIBC SERPL-MCNC: 257 MCG/DL (ref 112–346)
VLDLC SERPL CALC-MCNC: 31 MG/DL (ref 5–40)
WBC # BLD AUTO: 9.55 10*3/MM3 (ref 3.4–10.8)

## 2024-05-09 DIAGNOSIS — N28.9 RENAL INSUFFICIENCY: ICD-10-CM

## 2024-05-24 ENCOUNTER — TELEPHONE (OUTPATIENT)
Dept: GASTROENTEROLOGY | Facility: CLINIC | Age: 49
End: 2024-05-24
Payer: COMMERCIAL

## 2024-05-24 NOTE — TELEPHONE ENCOUNTER
Letter sent via my chart and printed for mail to discuss fast track option for colonoscopy recall      Please verify  if any family history of colon cancer or colon polyps present    COLONOSCOPY (10/23/2020 10:00)   Tissue Pathology Exam (10/23/2020 10:10) -tubular adenoma    Semaglutide is on med list    I recommend he start utilizing miralax daily, adjusting dose to facilitate comfortable bowel movement     Multi day prep will also be beneficial (4 dose miralax or mag citrate night prior to clear liq diet)

## 2024-06-06 ENCOUNTER — PATIENT OUTREACH (OUTPATIENT)
Dept: CASE MANAGEMENT | Facility: OTHER | Age: 49
End: 2024-06-06
Payer: COMMERCIAL

## 2024-06-06 NOTE — OUTREACH NOTE
[facility] Enrollment    Was the enrollment attempt to reach the patient successful?: yes  Date/Time of successful contact: 6/6/2024 11:50 AM  Patient response: not interested           Allison VELA  Ambulatory Case Management    6/6/2024, 11:50 CDT

## 2024-06-11 ENCOUNTER — OFFICE VISIT (OUTPATIENT)
Dept: FAMILY MEDICINE CLINIC | Facility: CLINIC | Age: 49
End: 2024-06-11
Payer: COMMERCIAL

## 2024-06-11 VITALS
WEIGHT: 315 LBS | SYSTOLIC BLOOD PRESSURE: 128 MMHG | TEMPERATURE: 97.1 F | HEART RATE: 83 BPM | DIASTOLIC BLOOD PRESSURE: 78 MMHG | BODY MASS INDEX: 40.43 KG/M2 | OXYGEN SATURATION: 95 % | HEIGHT: 74 IN

## 2024-06-11 DIAGNOSIS — R94.4 DECREASED GFR: ICD-10-CM

## 2024-06-11 DIAGNOSIS — Z79.1 NSAID LONG-TERM USE: ICD-10-CM

## 2024-06-11 DIAGNOSIS — N28.9 RENAL INSUFFICIENCY: ICD-10-CM

## 2024-06-11 DIAGNOSIS — E11.21 CONTROLLED TYPE 2 DIABETES MELLITUS WITH DIABETIC NEPHROPATHY, UNSPECIFIED WHETHER LONG TERM INSULIN USE: ICD-10-CM

## 2024-06-11 DIAGNOSIS — I10 PRIMARY HYPERTENSION: Chronic | ICD-10-CM

## 2024-06-11 DIAGNOSIS — E11.65 UNCONTROLLED TYPE 2 DIABETES MELLITUS WITH HYPERGLYCEMIA: ICD-10-CM

## 2024-06-11 PROCEDURE — 99214 OFFICE O/P EST MOD 30 MIN: CPT | Performed by: FAMILY MEDICINE

## 2024-06-11 RX ORDER — SEMAGLUTIDE 2.68 MG/ML
2 INJECTION, SOLUTION SUBCUTANEOUS WEEKLY
Qty: 3 ML | Refills: 5 | Status: SHIPPED | OUTPATIENT
Start: 2024-06-11

## 2024-06-11 NOTE — PROGRESS NOTES
VERONIKA”.   Subjective   Hank Hanna is a 49 y.o. male presenting with chief complaint of:   Chief Complaint   Patient presents with    Diabetes    Hyperlipidemia    Hypertension     AWV NA  Last Completed Annual Wellness Visit       This patient has no relevant Health Maintenance data.             History of Present Illness :  Alone.  Here for primarily f/u last renal test.       Has multiple chronic problems to consider that might have a bearing on today's issues;  an interval appointment.       Chronic/acute problems reviewed today:   1. Uncontrolled type 2 diabetes mellitus with hyperglycemia: doing better on current Rx.  Has had some supply chain problems.    2. NSAID long-term use -has stopped after heavy/long use for knee pain.  Avoiding surgery and injections did not help.  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 Primary hypertension: Chronic/stable. Stable here past/and home blood pressures.  No significant chest pain, SOB, LE edema, orthopnea, near syncope, dizziness/light headness.   Recent Vitals         10/4/2023 5/6/2024 6/11/2024       BP: 130/84 136/94 128/78     Pulse: 111 81 83     Temp: 97.3 °F (36.3 °C) 97.1 °F (36.2 °C) 97.1 °F (36.2 °C)     Weight: 160 kg (353 lb) 162 kg (356 lb 9.6 oz) 157 kg (346 lb)     BMI (Calculated): 45.3 45.8 44.4              4. Controlled type 2 diabetes mellitus with diabetic nephropathy, unspecified whether long term insulin use: Chronic/stable. No problem/pattern hypoglycemia/hyperglycemia manifest by poly- dypsia, phagia, uria, or sweats, diaphoretic episodes, syncope/near.     5. Decreased GFR: see #6   6. Renal insufficiency acute drop GFR last visit; was using daily NSAID.  Has stopped NSAID and using 60 oz fluid/24 hr.  Voiding ok.  Renal US ok     Has an/another acute issue today: none.    The following portions of the patient's history were reviewed and updated as appropriate: allergies, current medications, past family history, past  medical history, past social history, past surgical history, and problem list.      Current Outpatient Medications:     albuterol sulfate  (90 Base) MCG/ACT inhaler, INHALE 2 PUFFS BY MOUTH 4 TIMES A DAY AS NEEDED FOR WHEEZING, Disp: 6.7 g, Rfl: 3    atorvastatin (LIPITOR) 20 MG tablet, TAKE 1 TABLET BY MOUTH EVERY DAY AT NIGHT, Disp: 90 tablet, Rfl: 3    Blood Glucose Monitoring Suppl (Accu-Chek Guide Me) w/Device kit, 1 Device 4 (Four) Times a Day Before Meals & at Bedtime., Disp: 1 kit, Rfl: 0    Blood Glucose Monitoring Suppl (B-D LOGIC BLOOD GLUCOSE) w/Device kit, 1 each Every Morning Before Breakfast. And once randomly during the day., Disp: 1 each, Rfl: 0    esomeprazole (nexIUM) 40 MG capsule, TAKE 1 CAPSULE BY MOUTH EVERY DAY IN THE MORNING BEFORE BREAKFAST, Disp: 90 capsule, Rfl: 0    glucose blood (Accu-Chek Guide) test strip, Test every four hours while awake, bring in reading 24 hours, then test AC and HS, Disp: 200 each, Rfl: 5    losartan (COZAAR) 100 MG tablet, Take 1 tablet by mouth Daily., Disp: 90 tablet, Rfl: 3    metoprolol succinate XL (TOPROL-XL) 100 MG 24 hr tablet, Take 1 tablet by mouth Daily., Disp: 90 tablet, Rfl: 3    Multiple Vitamins-Minerals (MULTI-VITAMIN GUMMIES) chewable tablet, Chew 2 (Two) Times a Day., Disp: , Rfl:     Nitric Acid liquid, For exercise two tablets daily, Disp: , Rfl:     ONETOUCH DELICA LANCETS FINE misc, Use 1 prior to breakfast and then 1 randomly during the day., Disp: 100 each, Rfl: 2    Semaglutide, 1 MG/DOSE, (Ozempic, 1 MG/DOSE,) 4 MG/3ML solution pen-injector, Inject 2 mg under the skin into the appropriate area as directed 1 (One) Time Per Week., Disp: 3 mL, Rfl: 5    Semaglutide, 2 MG/DOSE, (Ozempic, 2 MG/DOSE,) 8 MG/3ML solution pen-injector, Inject 2 mg under the skin into the appropriate area as directed 1 (One) Time Per Week., Disp: 3 mL, Rfl: 0    tadalafil (CIALIS) 20 MG tablet, Take 1 tablet by mouth Daily As Needed for Erectile  "Dysfunction., Disp: , Rfl:     Tirzepatide (Mounjaro) 5 MG/0.5ML solution pen-injector, Inject 0.5 mL under the skin into the appropriate area as directed 1 (One) Time Per Week., Disp: 6 mL, Rfl: 0    No problems with medications.    Allergies   Allergen Reactions    Hydrochlorothiazide Rash    Lisinopril Cough     \"made me cough\"    Norvasc [Amlodipine] Other (See Comments)     Gum issue       Review of Systems  GENERAL:  Active/slower with limits, speed, samni for age. Sleeps ok. No fever.  ENDO:  No syncope, near or diaphoretic sweaty spells.    HEENT: No head injury or headache.   No vision change; with glasses..  No hearing loss/pain/drainage.  No sore throat.  No nasal/sinus congestion/drainage. No epistaxis.  CHEST: No chest wall tenderness or mass. No cough, wheeze, hemoptysis; mild SOB exertion.  CV: No chest pain, palpatations; same occ/mild ankle edema.  GI: No heartburn, dysphagia, abdominal pain, diarrhea, constipation, rectal bleeding, or melena.  :  Voids without dysuria, or incontience to completion.  ORTHO: No painful/swollen joints but various on /off sore especially knees.   No sore neck or back.  No acute neck or back pain without recent injury.   NEURO: No dizziness, weakness of extremities.  No numbness/parethesias.   PSYCH: No memory loss.  Mood good; not anxious, depressed or suicidal.  Tolerated stress.      Screening:  Mammogram: NA  Bone density: NA  Low dose CT chest: Tobacco-smoker/age 14/<1 ppd/dc 12.1.2013 (22)-NA  GI:   EGD-neg/LIZZY/Manjit/10.23.20/prn  Colon-p/Manjit/LIZZY/10.23.20/3y-reminded repeatedly  M2-missed small part/10.2020  Prostate:   5.6.24 voiding ok; exam he declined  7.17.23; voiding ok  Usual lab order  6m BMP, A1c  12m CBC, CMP, A1c, LIPID    Copy/paste function used for ROS/exam AND each area of these were reviewed, updated, confirmed and supplemented as needed.  Data reviewed:   Recent admit/ER/MD visits: 5.6.24    1. Gastroesophageal reflux disease, unspecified " whether esophagitis present    2. Hyperlipidemia, unspecified hyperlipidemia type    3. Uncontrolled type 2 diabetes mellitus with hyperglycemia    4. Multiple chronic diseases    5. Osteoarthritis, unspecified osteoarthritis type, unspecified site    6. Anemia, unspecified type    7. NSAID long-term use    8. Liver enzyme elevation    9. Primary hypertension    10. Erectile dysfunction, unspecified erectile dysfunction type    11. Drug-induced constipation    12. Polyp of colon, unspecified part of colon, unspecified type       Data review above:   Discussions/medical decisions/reviews:  BP   Other vitals   Recent Vitals           12/22/2022 7/17/2023 10/4/2023          BP: 134/82 140/86 130/84       Pulse: 87 76 111       Temp: -- 97.1 °F (36.2 °C) 97.3 °F (36.3 °C)       Weight: 164 kg (361 lb) 160 kg (352 lb) 160 kg (353 lb)       BMI (Calculated): 46.3 45.2 45.3               DM/A1c 10.8 10.2.23  DM/ 10.2.23  Lipid  10.2.23; lipitor 20  PSA ok 10.2.23  CBC ok 10.2.23  Renal ok 10.2.23  Liver ALT, /79 10.2.23; worse  Vit D sometime  Thyroid TSH, fT4 ok 9.14.22     Wellness/or annual done   Screening reviewed/updated reminded behind on colon-agreed to allow referral  Vaccines discussed (see below) offered pneumonia age 49  Labs: CBC, CMP, A1c, LIPID, iron, ferritin-today  Discussed upcoming long term of me/Dr Saxena.  Discussed new physician, Dr Escobar Novak coming as replacement. Decision to: stay at /[facility]     Follow up: No follow-ups on file.  No future appointments.     Data review above:   Rx: reviewed and decisions:   Rx new/changes: none  No orders of the defined types were placed in this encounter.        Orders placed:   LAB/Testing/Referrals: reviewed/orders:   Today:       Orders Placed This Encounter   Procedures    Comprehensive Metabolic Panel    Hemoglobin A1c    Iron Profile    Ferritin    Lipid Panel    Ambulatory Referral to Gastroenterology    CBC & Differential        Last cardiac testing:   Echo:     Radiology considered:   No radiology results for the last 90 days.  [facility]/5.7.24  renal  ok    Lab Results:  Results for orders placed or performed in visit on 05/06/24   Comprehensive Metabolic Panel    Specimen: Blood   Result Value Ref Range    Glucose 115 (H) 65 - 99 mg/dL    BUN 29 (H) 6 - 20 mg/dL    Creatinine 3.11 (H) 0.76 - 1.27 mg/dL    EGFR Result 23.4 (L) >60.0 mL/min/1.73    BUN/Creatinine Ratio 9.3 7.0 - 25.0    Sodium 139 136 - 145 mmol/L    Potassium 4.6 3.5 - 5.2 mmol/L    Chloride 102 98 - 107 mmol/L    Total CO2 22.9 22.0 - 29.0 mmol/L    Calcium 9.1 8.6 - 10.5 mg/dL    Total Protein 6.9 6.0 - 8.5 g/dL    Albumin 4.3 3.5 - 5.2 g/dL    Globulin 2.6 gm/dL    A/G Ratio 1.7 g/dL    Total Bilirubin 0.9 0.0 - 1.2 mg/dL    Alkaline Phosphatase 118 (H) 39 - 117 U/L    AST (SGOT) 43 (H) 1 - 40 U/L    ALT (SGPT) 61 (H) 1 - 41 U/L   Hemoglobin A1c    Specimen: Blood   Result Value Ref Range    Hemoglobin A1C 6.80 (H) 4.80 - 5.60 %   Iron Profile    Specimen: Blood   Result Value Ref Range    TIBC 368 mcg/dL    UIBC 257 112 - 346 mcg/dL    Iron 111 59 - 158 mcg/dL    Iron Saturation 30 20 - 50 %   Ferritin    Specimen: Blood   Result Value Ref Range    Ferritin 173.00 30.00 - 400.00 ng/mL   Lipid Panel    Specimen: Blood   Result Value Ref Range    Total Cholesterol 179 0 - 200 mg/dL    Triglycerides 181 (H) 0 - 150 mg/dL    HDL Cholesterol 46 40 - 60 mg/dL    VLDL Cholesterol Balta 31 5 - 40 mg/dL    LDL Chol Calc (NIH) 102 (H) 0 - 100 mg/dL   CBC & Differential    Specimen: Blood   Result Value Ref Range    WBC 9.55 3.40 - 10.80 10*3/mm3    RBC 4.76 4.14 - 5.80 10*6/mm3    Hemoglobin 13.0 13.0 - 17.7 g/dL    Hematocrit 40.0 37.5 - 51.0 %    MCV 84.0 79.0 - 97.0 fL    MCH 27.3 26.6 - 33.0 pg    MCHC 32.5 31.5 - 35.7 g/dL    RDW 13.6 12.3 - 15.4 %    Platelets 276 140 - 450 10*3/mm3    Neutrophil Rel % 75.3 42.7 - 76.0 %    Lymphocyte Rel % 13.1 (L) 19.6 - 45.3 %     Monocyte Rel % 9.5 5.0 - 12.0 %    Eosinophil Rel % 0.9 0.3 - 6.2 %    Basophil Rel % 0.6 0.0 - 1.5 %    Neutrophils Absolute 7.18 (H) 1.70 - 7.00 10*3/mm3    Lymphocytes Absolute 1.25 0.70 - 3.10 10*3/mm3    Monocytes Absolute 0.91 (H) 0.10 - 0.90 10*3/mm3    Eosinophils Absolute 0.09 0.00 - 0.40 10*3/mm3    Basophils Absolute 0.06 0.00 - 0.20 10*3/mm3    Immature Granulocyte Rel % 0.6 (H) 0.0 - 0.5 %    Immature Grans Absolute 0.06 (H) 0.00 - 0.05 10*3/mm3    nRBC 0.0 0.0 - 0.2 /100 WBC       A1C:  Lab Results - Last 18 Months   Lab Units 05/06/24  0942 10/02/23  0916 03/22/23  0734 12/21/22  1341   HEMOGLOBIN A1C % 6.80* 10.80* 6.60* 7.00*     GLUCOSE:  Lab Results - Last 18 Months   Lab Units 05/06/24  0942 10/02/23  0916 03/22/23  0734 12/21/22  1341   GLUCOSE mg/dL 115* 173* 127* 117*     LIPID:  Lab Results - Last 18 Months   Lab Units 05/06/24  0942 10/02/23  0916   CHOLESTEROL mg/dL 179 269*   LDL CHOL mg/dL 102* 200*   HDL CHOL mg/dL 46 41   TRIGLYCERIDES mg/dL 181* 151*     PSA:  Lab Results   Component Value Date/Time    PSA 1.670 10/02/2023 09:16 AM    PSA 1.790 09/14/2022 09:59 AM       CBC:  Lab Results - Last 18 Months   Lab Units 05/06/24  0942 10/02/23  0916   WBC 10*3/mm3 9.55 7.54   HEMOGLOBIN g/dL 13.0 13.5   HEMATOCRIT % 40.0 41.2   PLATELETS 10*3/mm3 276 278   IRON mcg/dL 111  --      BMP/CMP:  Lab Results - Last 18 Months   Lab Units 05/06/24  0942 10/02/23  0916 03/22/23  0734 12/21/22  1341   SODIUM mmol/L 139 140 143 142   POTASSIUM mmol/L 4.6 4.9 4.4 4.0   CHLORIDE mmol/L 102 101 105 106   CO2 mmol/L 22.9 27.8 26.3 25.0   GLUCOSE mg/dL 115* 173* 127* 117*   BUN mg/dL 29* 16 18 16   CREATININE mg/dL 3.11* 1.24 1.41* 1.65*   EGFR RESULT mL/min/1.73 23.4* 70.8 60.7 50.6*   CALCIUM mg/dL 9.1 10.1 9.2 9.4   URIC ACID mg/dL  --  5.5  --   --        HEPATIC:  Lab Results - Last 18 Months   Lab Units 05/06/24  0942 10/02/23  0916 03/22/23  0734   ALT (SGPT) U/L 61* 154* 54*   AST (SGOT) U/L 43*  "79* 32   ALK PHOS U/L 118* 111 93     HEPATITIS C ANTIBODY: No results found for: \"HEPCVIRUSABY\"  Vit D:No results for input(s): \"URIT90CD\" in the last 94562 hours.  THYROID:No results for input(s): \"TSH\", \"FREET4\", \"FTI\" in the last 68217 hours.    Invalid input(s): \"FREET3\", \"T3\", \"T4\", \"TEUP\", \"TOTALT4\"    Objective   /78   Pulse 83   Temp 97.1 °F (36.2 °C)   Ht 188 cm (74\")   Wt (!) 157 kg (346 lb)   SpO2 95%   BMI 44.42 kg/m²   Body mass index is 44.42 kg/m².    Recent Vitals         10/4/2023 5/6/2024 6/11/2024       BP: 130/84 136/94 128/78     Pulse: 111 81 83     Temp: 97.3 °F (36.3 °C) 97.1 °F (36.2 °C) 97.1 °F (36.2 °C)     Weight: 160 kg (353 lb) 162 kg (356 lb 9.6 oz) 157 kg (346 lb)     BMI (Calculated): 45.3 45.8 44.4           Wt Readings from Last 15 Encounters:   06/11/24 1425 (!) 157 kg (346 lb)   05/06/24 0957 (!) 162 kg (356 lb 9.6 oz)   10/04/23 0832 (!) 160 kg (353 lb)   07/17/23 1115 (!) 160 kg (352 lb)   12/22/22 1309 (!) 164 kg (361 lb)   09/26/22 1142 (!) 163 kg (360 lb 3.2 oz)   09/15/22 1507 (!) 161 kg (356 lb)   09/14/22 0836 (!) 162 kg (356 lb 9.6 oz)   01/24/22 0840 (!) 159 kg (350 lb)   02/02/21 1306 (!) 159 kg (350 lb)   10/23/20 0928 (!) 155 kg (342 lb)   09/10/20 1242 (!) 158 kg (349 lb)   07/22/20 1308 (!) 160 kg (351 lb 12.8 oz)   12/31/19 1147 (!) 155 kg (342 lb)   10/07/19 1428 (!) 156 kg (343 lb)       Physical Exam  Constitutional:       General: He is not in acute distress.     Appearance: He is obese.   HENT:      Mouth/Throat:      Mouth: Mucous membranes are moist.   Eyes:      Extraocular Movements: Extraocular movements intact.      Conjunctiva/sclera: Conjunctivae normal.      Pupils: Pupils are equal, round, and reactive to light.   Cardiovascular:      Rate and Rhythm: Normal rate and regular rhythm.      Pulses: Normal pulses.      Heart sounds: Normal heart sounds.   Abdominal:      Palpations: Abdomen is soft.   Musculoskeletal:      Cervical back: " Neck supple.      Right lower leg: No edema.      Left lower leg: No edema.   Lymphadenopathy:      Cervical: No cervical adenopathy.   Neurological:      Mental Status: He is alert and oriented to person, place, and time. Mental status is at baseline.         Assessment & Plan     1. Uncontrolled type 2 diabetes mellitus with hyperglycemia    2. NSAID long-term use    3. Primary hypertension    4. Controlled type 2 diabetes mellitus with diabetic nephropathy, unspecified whether long term insulin use    5. Decreased GFR    6. Renal insufficiency        Data review above:   Discussions/medical decisions/reviews:  BP ok  Other vitals ok  Recent Vitals         10/4/2023 5/6/2024 6/11/2024       BP: 130/84 136/94 128/78     Pulse: 111 81 83     Temp: 97.3 °F (36.3 °C) 97.1 °F (36.2 °C) 97.1 °F (36.2 °C)     Weight: 160 kg (353 lb) 162 kg (356 lb 9.6 oz) 157 kg (346 lb)     BMI (Calculated): 45.3 45.8 44.4           A1c 6.8 5.6.24  BS: 118 5.6.24  LIPID: 102 5.6.24; lipitor 20  PSA: ok 10.2.23  CBC: ok 10.  Renal: 23 5.6.24; has since stopped NSAID  Liver: ALT 61, AST 43, Alk p 5.6.24  Vit D: sometime  Thyroid: sometime    Screening reviewed/updated   Hoping off NSAID/hydrated renal back to normal  Nephrology if not  No NSAID  Work with ortho as/when ready (even if total knee)    Discussed upcoming group home of me/Dr Saxena.  Discussed new physician, Dr Escobar Novak coming as replacement. Decision to: stay at /[facility]    Follow up: Return for lab today then lab/Dr Novak 6m .  Future Appointments   Date Time Provider Department Center   12/2/2024  9:00 AM Mendez Novak MD [facility]       Data review above:   Rx: reviewed and decisions:   Rx new/changes: clarify increase  New Medications Ordered This Visit   Medications    Semaglutide, 2 MG/DOSE, (Ozempic, 2 MG/DOSE,) 8 MG/3ML solution pen-injector     Sig: Inject 2 mg under the skin into the appropriate area as directed 1 (One) Time Per Week.     Dispense:  3 mL      "Refill:  5       Orders placed:   LAB/Testing/Referrals: reviewed/orders:   Today:   Orders Placed This Encounter   Procedures    Comprehensive Metabolic Panel    CBC & Differential     Chronic/recurrent labs above or change to:   Same       There is no immunization history on file for this patient.  We advised/reaffirmed our support/suggestion for staying complete with covid- covid boosters, seasonal flu/yearly and any missing vaccine from list we supplied; when cannot be given here we suggest contact with local health department office or pharmacy to review missing/needed vaccines and then bring nursing documentation for these vaccines to this office or call this information in. Shingles became \"free\" 1.1.23 for medicare insurance.    Health maintenance:   Body mass index is 44.42 kg/m².         Tobacco use reviewed:   Hank Hanna  reports that he quit smoking about 11 years ago. His smoking use included cigarettes. He started smoking about 35 years ago. He has a 18.7 pack-year smoking history. He has been exposed to tobacco smoke. He quit smokeless tobacco use about 34 years ago.  His smokeless tobacco use included chew. I    There are no Patient Instructions on file for this visit.          "

## 2024-06-12 LAB
ALBUMIN SERPL-MCNC: 4.5 G/DL (ref 3.5–5.2)
ALBUMIN/GLOB SERPL: 1.7 G/DL
ALP SERPL-CCNC: 97 U/L (ref 39–117)
ALT SERPL-CCNC: 43 U/L (ref 1–41)
AST SERPL-CCNC: 28 U/L (ref 1–40)
BASOPHILS # BLD AUTO: 0.05 10*3/MM3 (ref 0–0.2)
BASOPHILS NFR BLD AUTO: 0.7 % (ref 0–1.5)
BILIRUB SERPL-MCNC: 0.6 MG/DL (ref 0–1.2)
BUN SERPL-MCNC: 20 MG/DL (ref 6–20)
BUN/CREAT SERPL: 16 (ref 7–25)
CALCIUM SERPL-MCNC: 9.4 MG/DL (ref 8.6–10.5)
CHLORIDE SERPL-SCNC: 104 MMOL/L (ref 98–107)
CO2 SERPL-SCNC: 24.2 MMOL/L (ref 22–29)
CREAT SERPL-MCNC: 1.25 MG/DL (ref 0.76–1.27)
EGFRCR SERPLBLD CKD-EPI 2021: 69.7 ML/MIN/1.73
EOSINOPHIL # BLD AUTO: 0.12 10*3/MM3 (ref 0–0.4)
EOSINOPHIL NFR BLD AUTO: 1.7 % (ref 0.3–6.2)
ERYTHROCYTE [DISTWIDTH] IN BLOOD BY AUTOMATED COUNT: 14 % (ref 12.3–15.4)
GLOBULIN SER CALC-MCNC: 2.6 GM/DL
GLUCOSE SERPL-MCNC: 108 MG/DL (ref 65–99)
HCT VFR BLD AUTO: 39.8 % (ref 37.5–51)
HGB BLD-MCNC: 13.2 G/DL (ref 13–17.7)
IMM GRANULOCYTES # BLD AUTO: 0.03 10*3/MM3 (ref 0–0.05)
IMM GRANULOCYTES NFR BLD AUTO: 0.4 % (ref 0–0.5)
LYMPHOCYTES # BLD AUTO: 1.66 10*3/MM3 (ref 0.7–3.1)
LYMPHOCYTES NFR BLD AUTO: 23.6 % (ref 19.6–45.3)
MCH RBC QN AUTO: 28.3 PG (ref 26.6–33)
MCHC RBC AUTO-ENTMCNC: 33.2 G/DL (ref 31.5–35.7)
MCV RBC AUTO: 85.4 FL (ref 79–97)
MONOCYTES # BLD AUTO: 0.69 10*3/MM3 (ref 0.1–0.9)
MONOCYTES NFR BLD AUTO: 9.8 % (ref 5–12)
NEUTROPHILS # BLD AUTO: 4.49 10*3/MM3 (ref 1.7–7)
NEUTROPHILS NFR BLD AUTO: 63.8 % (ref 42.7–76)
NRBC BLD AUTO-RTO: 0 /100 WBC (ref 0–0.2)
PLATELET # BLD AUTO: 248 10*3/MM3 (ref 140–450)
POTASSIUM SERPL-SCNC: 4.4 MMOL/L (ref 3.5–5.2)
PROT SERPL-MCNC: 7.1 G/DL (ref 6–8.5)
RBC # BLD AUTO: 4.66 10*6/MM3 (ref 4.14–5.8)
SODIUM SERPL-SCNC: 139 MMOL/L (ref 136–145)
WBC # BLD AUTO: 7.04 10*3/MM3 (ref 3.4–10.8)

## 2024-06-13 ENCOUNTER — PATIENT MESSAGE (OUTPATIENT)
Dept: GASTROENTEROLOGY | Facility: CLINIC | Age: 49
End: 2024-06-13
Payer: COMMERCIAL

## 2024-06-13 DIAGNOSIS — Z86.010 HISTORY OF ADENOMATOUS POLYP OF COLON: Primary | ICD-10-CM

## 2024-06-13 PROBLEM — Z86.0101 HISTORY OF ADENOMATOUS POLYP OF COLON: Status: ACTIVE | Noted: 2024-06-13

## 2024-06-14 ENCOUNTER — TELEPHONE (OUTPATIENT)
Dept: GASTROENTEROLOGY | Facility: CLINIC | Age: 49
End: 2024-06-14
Payer: COMMERCIAL

## 2024-06-14 ENCOUNTER — ANESTHESIA EVENT (OUTPATIENT)
Dept: GASTROENTEROLOGY | Facility: HOSPITAL | Age: 49
End: 2024-06-14
Payer: COMMERCIAL

## 2024-06-14 ENCOUNTER — HOSPITAL ENCOUNTER (OUTPATIENT)
Facility: HOSPITAL | Age: 49
Setting detail: HOSPITAL OUTPATIENT SURGERY
Discharge: HOME | End: 2024-06-14
Attending: INTERNAL MEDICINE | Admitting: INTERNAL MEDICINE
Payer: COMMERCIAL

## 2024-06-14 ENCOUNTER — ANESTHESIA (OUTPATIENT)
Dept: GASTROENTEROLOGY | Facility: HOSPITAL | Age: 49
End: 2024-06-14
Payer: COMMERCIAL

## 2024-06-14 VITALS
SYSTOLIC BLOOD PRESSURE: 131 MMHG | HEART RATE: 96 BPM | OXYGEN SATURATION: 98 % | RESPIRATION RATE: 18 BRPM | BODY MASS INDEX: 40.43 KG/M2 | DIASTOLIC BLOOD PRESSURE: 86 MMHG | TEMPERATURE: 96.7 F | HEIGHT: 74 IN | WEIGHT: 315 LBS

## 2024-06-14 DIAGNOSIS — Z86.010 HISTORY OF ADENOMATOUS POLYP OF COLON: ICD-10-CM

## 2024-06-14 LAB — GLUCOSE BLDC GLUCOMTR-MCNC: 122 MG/DL (ref 70–130)

## 2024-06-14 PROCEDURE — 25810000003 SODIUM CHLORIDE 0.9 % SOLUTION: Performed by: ANESTHESIOLOGY

## 2024-06-14 PROCEDURE — 45378 DIAGNOSTIC COLONOSCOPY: CPT | Performed by: INTERNAL MEDICINE

## 2024-06-14 PROCEDURE — 82948 REAGENT STRIP/BLOOD GLUCOSE: CPT

## 2024-06-14 PROCEDURE — 25010000002 PROPOFOL 10 MG/ML EMULSION: Performed by: NURSE ANESTHETIST, CERTIFIED REGISTERED

## 2024-06-14 RX ORDER — SODIUM CHLORIDE 9 MG/ML
100 INJECTION, SOLUTION INTRAVENOUS CONTINUOUS
Status: CANCELLED | OUTPATIENT
Start: 2024-06-14

## 2024-06-14 RX ORDER — SODIUM CHLORIDE 0.9 % (FLUSH) 0.9 %
10 SYRINGE (ML) INJECTION EVERY 12 HOURS SCHEDULED
Status: CANCELLED | OUTPATIENT
Start: 2024-06-14

## 2024-06-14 RX ORDER — LIDOCAINE HYDROCHLORIDE 20 MG/ML
INJECTION, SOLUTION EPIDURAL; INFILTRATION; INTRACAUDAL; PERINEURAL AS NEEDED
Status: DISCONTINUED | OUTPATIENT
Start: 2024-06-14 | End: 2024-06-14 | Stop reason: SURG

## 2024-06-14 RX ORDER — SODIUM CHLORIDE 0.9 % (FLUSH) 0.9 %
10 SYRINGE (ML) INJECTION AS NEEDED
Status: CANCELLED | OUTPATIENT
Start: 2024-06-14

## 2024-06-14 RX ORDER — OMEPRAZOLE 20 MG/1
20 CAPSULE, DELAYED RELEASE ORAL DAILY
COMMUNITY

## 2024-06-14 RX ORDER — SODIUM CHLORIDE 9 MG/ML
500 INJECTION, SOLUTION INTRAVENOUS CONTINUOUS PRN
Status: DISCONTINUED | OUTPATIENT
Start: 2024-06-14 | End: 2024-06-14 | Stop reason: HOSPADM

## 2024-06-14 RX ORDER — PROPOFOL 10 MG/ML
VIAL (ML) INTRAVENOUS AS NEEDED
Status: DISCONTINUED | OUTPATIENT
Start: 2024-06-14 | End: 2024-06-14 | Stop reason: SURG

## 2024-06-14 RX ORDER — SODIUM CHLORIDE 9 MG/ML
40 INJECTION, SOLUTION INTRAVENOUS AS NEEDED
Status: CANCELLED | OUTPATIENT
Start: 2024-06-14

## 2024-06-14 RX ADMIN — SODIUM CHLORIDE 500 ML: 9 INJECTION, SOLUTION INTRAVENOUS at 07:38

## 2024-06-14 RX ADMIN — LIDOCAINE HYDROCHLORIDE 100 MG: 20 INJECTION, SOLUTION EPIDURAL; INFILTRATION; INTRACAUDAL; PERINEURAL at 08:29

## 2024-06-14 RX ADMIN — PROPOFOL 400 MG: 10 INJECTION, EMULSION INTRAVENOUS at 08:29

## 2024-06-14 NOTE — ANESTHESIA PREPROCEDURE EVALUATION
Anesthesia Evaluation     Patient summary reviewed   history of anesthetic complications:   NPO Solid Status: > 8 hours  NPO Liquid Status: > 4 hours           Airway   Mallampati: II  TM distance: >3 FB  Neck ROM: full  No difficulty expected  Dental - normal exam     Pulmonary    (+) a smoker Former,sleep apnea on CPAP  Cardiovascular   Exercise tolerance: good (4-7 METS)    Patient on routine beta blocker and Beta blocker given within 24 hours of surgery  Rhythm: regular  Rate: normal    (+) hypertension well controlled, hyperlipidemia      Neuro/Psych- negative ROS  GI/Hepatic/Renal/Endo    (+) obesity, morbid obesity, GERD poorly controlled, renal disease- stones, diabetes mellitus type 2 well controlled    Musculoskeletal     Abdominal    Substance History      OB/GYN          Other                          Anesthesia Plan    ASA 3     MAC     (Red hair )  intravenous induction     Anesthetic plan, risks, benefits, and alternatives have been provided, discussed and informed consent has been obtained with: patient.

## 2024-06-14 NOTE — H&P
[facility] Gastroenterology  Pre Procedure History & Physical    Chief Complaint:   Polyps    Subjective     HPI:   Polyps    Past Medical History:   Past Medical History:   Diagnosis Date    Arthritis     Diabetes mellitus     GERD (gastroesophageal reflux disease)     Hyperlipidemia     Hypertension     Obesity     pt states wears a cpap but has never had a sleep study, pt states he is self diagnosed       Past Surgical History:  Past Surgical History:   Procedure Laterality Date    BACK SURGERY      CAPSULE ENDOSCOPY N/A 10/27/2020    Procedure: CAPSULE ENDOSCOPY M2A;  Surgeon: Shakeel Saravia DO;  Location: [facility] ENDOSCOPY;  Service: Gastroenterology;  Laterality: N/A;    COLONOSCOPY N/A 10/23/2020    Procedure: COLONOSCOPY WITH ANESTHESIA;  Surgeon: Shakeel Saravia DO;  Location:  [facility] ENDOSCOPY;  Service: Gastroenterology;  Laterality: N/A;  pre op: heme pos stool  postop:polyps  PCP: Rajan Saxena MD    ENDOSCOPY N/A 10/23/2020    Procedure: ESOPHAGOGASTRODUODENOSCOPY WITH ANESTHESIA;  Surgeon: Shakeel Saravia DO;  Location: [facility] ENDOSCOPY;  Service: Gastroenterology;  Laterality: N/A;  pre op: anemia  post op;normal  PCP: Rajan Saxena MD    KNEE SURGERY Bilateral     SPINE SURGERY         Family History:  Family History   Problem Relation Age of Onset    Arthritis Father     COPD Father     Diabetes Father     Hyperlipidemia Father     Colon polyps Father     Cancer Maternal Grandfather     Lung cancer Paternal Grandfather     Liver cancer Paternal Grandfather     Colon cancer Neg Hx     Esophageal cancer Neg Hx        Social History:   reports that he quit smoking about 11 years ago. His smoking use included cigarettes. He started smoking about 35 years ago. He has a 18.7 pack-year smoking history. He has been exposed to tobacco smoke. He quit smokeless tobacco use about 34 years ago.  His smokeless tobacco use included chew. He reports current alcohol use of about 18.0  standard drinks of alcohol per week. He reports that he does not use drugs.    Medications:   Prior to Admission medications    Medication Sig Start Date End Date Taking? Authorizing Provider   atorvastatin (LIPITOR) 20 MG tablet TAKE 1 TABLET BY MOUTH EVERY DAY AT NIGHT 11/13/23  Yes Rajan Saxena MD   losartan (COZAAR) 100 MG tablet Take 1 tablet by mouth Daily. 9/25/23  Yes Rajan Saxena MD   metoprolol succinate XL (TOPROL-XL) 100 MG 24 hr tablet Take 1 tablet by mouth Daily. 9/25/23  Yes Rajan Saxena MD   Multiple Vitamins-Minerals (MULTI-VITAMIN GUMMIES) chewable tablet Chew 2 (Two) Times a Day.   Yes ProviderJohnny MD   Nitric Acid liquid For exercise two tablets daily   Yes ProviderJohnny MD   omeprazole (priLOSEC) 20 MG capsule Take 1 capsule by mouth Daily.   Yes ProviderJohnny MD   albuterol sulfate  (90 Base) MCG/ACT inhaler INHALE 2 PUFFS BY MOUTH 4 TIMES A DAY AS NEEDED FOR WHEEZING 11/13/23   Rajan Saxena MD   Blood Glucose Monitoring Suppl (Accu-Chek Guide Me) w/Device kit 1 Device 4 (Four) Times a Day Before Meals & at Bedtime. 9/14/22   Rajan Saxena MD   Blood Glucose Monitoring Suppl (B-D LOGIC BLOOD GLUCOSE) w/Device kit 1 each Every Morning Before Breakfast. And once randomly during the day. 10/16/19   Rajan Saxena MD   esomeprazole (nexIUM) 40 MG capsule TAKE 1 CAPSULE BY MOUTH EVERY DAY IN THE MORNING BEFORE BREAKFAST 10/18/23   Rajan Saxena MD   glucose blood (Accu-Chek Guide) test strip Test every four hours while awake, bring in reading 24 hours, then test AC and HS 9/25/23   Rajan Saxena MD   ONETOUCH DELICA LANCETS FINE misc Use 1 prior to breakfast and then 1 randomly during the day. 10/16/19   Rajan Saxena MD   Semaglutide, 2 MG/DOSE, (Ozempic, 2 MG/DOSE,) 8 MG/3ML solution pen-injector Inject 2 mg under the skin into the appropriate area as directed 1 (One) Time Per Week. 6/11/24   " Rajan Saxena MD   tadalafil (CIALIS) 20 MG tablet Take 1 tablet by mouth Daily As Needed for Erectile Dysfunction.    Provider, MD Johnny   Tirzepatide (Mounjaro) 5 MG/0.5ML solution pen-injector Inject 0.5 mL under the skin into the appropriate area as directed 1 (One) Time Per Week. 11/14/23   Rajan Saxena MD       Allergies:  Nsaids, Hydrochlorothiazide, Lisinopril, and Norvasc [amlodipine]    ROS:    General: Weight stable  Resp: No SOA  Cardiovascular: No CP    Objective     Blood pressure 131/86, pulse 96, temperature 96.7 °F (35.9 °C), temperature source Temporal, resp. rate 18, height 188 cm (74\"), weight (!) 156 kg (345 lb), SpO2 98%.    Physical Exam   Constitutional: Pt is oriented to person, place, and in no distress.   HENT: Mouth/Throat: Oropharynx is clear.   Cardiovascular: Normal rate, regular rhythm.    Pulmonary/Chest: Effort normal. No respiratory distress. No  wheezes.   Abdominal: Soft. Non-distended.  Skin: Skin is warm and dry.   Psychiatric: Mood, memory, affect and judgment appear normal.     Assessment & Plan     Diagnosis:  polyps    Anticipated Surgical Procedure:  C-scope    The risks, benefits, and alternatives of this procedure have been discussed with the patient or the responsible party- the patient understands and agrees to proceed.        "

## 2024-06-14 NOTE — ANESTHESIA POSTPROCEDURE EVALUATION
Patient: Hank Hanna    Procedure Summary       Date: 06/14/24 Room / Location:  [facility] /  [facility]    Anesthesia Start: 0817 Anesthesia Stop: 0845    Procedure: COLONOSCOPY WITH ANESTHESIA Diagnosis:       History of adenomatous polyp of colon      (History of adenomatous polyp of colon [Z86.010])    Surgeons: Shakeel Saravia DO Provider: Bo Lemons CRNA    Anesthesia Type: MAC ASA Status: 3            Anesthesia Type: MAC    Vitals  Vitals Value Taken Time   /86 06/14/24 0843   Temp     Pulse 94 06/14/24 0845   Resp 19 06/14/24 0841   SpO2 94 % 06/14/24 0845   Vitals shown include unfiled device data.        Post Anesthesia Care and Evaluation    Patient location during evaluation: PHASE II  Patient participation: complete - patient participated  Level of consciousness: awake and alert  Pain score: 0    Airway patency: patent  Anesthetic complications: No anesthetic complications  PONV Status: none  Cardiovascular status: acceptable  Respiratory status: acceptable  Hydration status: acceptable

## 2024-07-05 DIAGNOSIS — R05.3 POST-COVID CHRONIC COUGH: ICD-10-CM

## 2024-07-05 DIAGNOSIS — U09.9 POST-COVID CHRONIC COUGH: ICD-10-CM

## 2024-07-05 RX ORDER — ALBUTEROL SULFATE 90 UG/1
AEROSOL, METERED RESPIRATORY (INHALATION)
Qty: 6.7 G | Refills: 3 | Status: SHIPPED | OUTPATIENT
Start: 2024-07-05

## 2024-07-15 RX ORDER — DICLOFENAC SODIUM 75 MG/1
75 TABLET, DELAYED RELEASE ORAL 2 TIMES DAILY
Qty: 180 TABLET | Refills: 0 | OUTPATIENT
Start: 2024-07-15

## 2024-07-15 NOTE — TELEPHONE ENCOUNTER
Rx Refill Note  Requested Prescriptions     Pending Prescriptions Disp Refills    diclofenac (VOLTAREN) 75 MG EC tablet [Pharmacy Med Name: DICLOFENAC SOD EC 75 MG TAB] 180 tablet 0     Sig: TAKE 1 TABLET BY MOUTH TWICE A DAY      Last office visit with office: 06/11/2024  Next office visit with office: 12/02/2024  Mendez Novak    UDS: none    DATE OF LAST REFILL: 10/18/2023    Controlled Substance Agreement: not up to date    [place]         {TIP  Is Refill Pharmacy correct?:  Bertrand Alvarenga MA  07/15/24, 11:37 CDT

## 2024-09-08 DIAGNOSIS — I10 ESSENTIAL HYPERTENSION: ICD-10-CM

## 2024-09-09 RX ORDER — LOSARTAN POTASSIUM 100 MG/1
100 TABLET ORAL DAILY
Qty: 90 TABLET | Refills: 3 | Status: SHIPPED | OUTPATIENT
Start: 2024-09-09

## 2024-09-09 RX ORDER — METOPROLOL SUCCINATE 100 MG/1
100 TABLET, EXTENDED RELEASE ORAL DAILY
Qty: 90 TABLET | Refills: 3 | Status: SHIPPED | OUTPATIENT
Start: 2024-09-09

## 2024-10-31 DIAGNOSIS — E11.65 UNCONTROLLED TYPE 2 DIABETES MELLITUS WITH HYPERGLYCEMIA: ICD-10-CM

## 2024-10-31 RX ORDER — SEMAGLUTIDE 2.68 MG/ML
2 INJECTION, SOLUTION SUBCUTANEOUS WEEKLY
Qty: 3 ML | Refills: 5 | Status: SHIPPED | OUTPATIENT
Start: 2024-10-31

## 2024-10-31 NOTE — TELEPHONE ENCOUNTER
Caller: Hank Hanna    Relationship: Self    Best call back number: [phone]     Requested Prescriptions:   Requested Prescriptions     Pending Prescriptions Disp Refills    Semaglutide, 2 MG/DOSE, (Ozempic, 2 MG/DOSE,) 8 MG/3ML solution pen-injector 3 mL 5     Sig: Inject 2 mg under the skin into the appropriate area as directed 1 (One) Time Per Week.        Pharmacy where request should be sent: [facility] #[id number] - [place], [place] - [address] AT [address]OSS FROM [address]  [phone] [facility] [phone]      Last office visit with prescribing clinician: Visit date not found   Last telemedicine visit with prescribing clinician: Visit date not found   Next office visit with prescribing clinician: 12/2/2024     Additional details provided by patient: PATIENT STATED HAS ONE MORE TIME TO USE HIS INJECTOR AND HE'LL BE OUT.    Does the patient have less than a 3 day supply:  [x] Yes  [] No    Danitza Russell Rep   10/31/24 11:15 CDT

## 2025-01-26 NOTE — TELEPHONE ENCOUNTER
My blood sugar was 124 after eating Thanksgiving lunch with dessert and a small supper. Tested after supper.  No injection today.    Therefore; may stop insulin but watch 1-2/BS daily random to catch any increase back up    Bring any numbers she has 12.21.22 apt   26-Jan-2025 21:55

## 2025-04-28 DIAGNOSIS — E11.65 UNCONTROLLED TYPE 2 DIABETES MELLITUS WITH HYPERGLYCEMIA: ICD-10-CM

## 2025-04-28 RX ORDER — SEMAGLUTIDE 2.68 MG/ML
2 INJECTION, SOLUTION SUBCUTANEOUS WEEKLY
Qty: 3 ML | Refills: 5 | Status: SHIPPED | OUTPATIENT
Start: 2025-04-28

## 2025-04-28 NOTE — TELEPHONE ENCOUNTER
Caller: Hank Hanna    Relationship: Self    Best call back number:     [phone]        Requested Prescriptions:   Requested Prescriptions     Pending Prescriptions Disp Refills    Semaglutide, 2 MG/DOSE, (Ozempic, 2 MG/DOSE,) 8 MG/3ML solution pen-injector 3 mL 5     Sig: Inject 2 mg under the skin into the appropriate area as directed 1 (One) Time Per Week.        Pharmacy where request should be sent: [facility] #[id number] - [place], [place] - [address] [facility] [phone] [facility] [phone]      Last office visit with prescribing clinician: Visit date not found   Last telemedicine visit with prescribing clinician: Visit date not found   Next office visit with prescribing clinician: 5/15/2025     Additional details provided by patient: OUT OF MEDICATION & WAS OUT OF TOWN ON THE SHIP & WILL BE BACK THE WEEK OF MAY 15TH. HAS AN UPCOMING APPT. ON 5.15.    Does the patient have less than a 3 day supply:  [x] Yes  [] No    Would you like a call back once the refill request has been completed: [x] Yes [] No PLEASE CALL PATIENT BACK EITHER WAY    If the office needs to give you a call back, can they leave a voicemail: [] Yes [x] No    Danitza Llamas Rep   04/28/25 10:43 CDT

## 2025-04-29 ENCOUNTER — TELEPHONE (OUTPATIENT)
Dept: FAMILY MEDICINE CLINIC | Facility: CLINIC | Age: 50
End: 2025-04-29
Payer: COMMERCIAL

## 2025-04-29 NOTE — TELEPHONE ENCOUNTER
Pt called stating his ozempic is needing a PA on it. He was told by [facility] in [place] it is needing one. Pt is starting to use that pharmacy. Please advise

## 2025-05-14 ENCOUNTER — TELEPHONE (OUTPATIENT)
Dept: FAMILY MEDICINE CLINIC | Facility: CLINIC | Age: 50
End: 2025-05-14

## 2025-05-14 NOTE — TELEPHONE ENCOUNTER
Caller: Hank Hanna    Relationship to patient: Self    Best call back number:   Telephone Information:   Mobile [phone]        Patient is needing: TO KNOW IF HE WILL NEED LABS FOR HIS APPT TOMORROW AND IF PT WILL NEED TO FAST. DID NOT SEE ANY ACTIVE LABS. PLEASE CALL BACK WITH ADVISEMENT PREFERABLY TODAY

## 2025-05-15 ENCOUNTER — OFFICE VISIT (OUTPATIENT)
Dept: FAMILY MEDICINE CLINIC | Facility: CLINIC | Age: 50
End: 2025-05-15
Payer: COMMERCIAL

## 2025-05-15 VITALS
TEMPERATURE: 98.3 F | OXYGEN SATURATION: 98 % | RESPIRATION RATE: 18 BRPM | HEIGHT: 74 IN | BODY MASS INDEX: 40.43 KG/M2 | SYSTOLIC BLOOD PRESSURE: 144 MMHG | HEART RATE: 71 BPM | WEIGHT: 315 LBS | DIASTOLIC BLOOD PRESSURE: 82 MMHG

## 2025-05-15 DIAGNOSIS — E78.2 MIXED HYPERLIPIDEMIA: Chronic | ICD-10-CM

## 2025-05-15 DIAGNOSIS — M17.0 OSTEOARTHRITIS OF BOTH KNEES, UNSPECIFIED OSTEOARTHRITIS TYPE: ICD-10-CM

## 2025-05-15 DIAGNOSIS — E11.21 CONTROLLED TYPE 2 DIABETES MELLITUS WITH DIABETIC NEPHROPATHY, UNSPECIFIED WHETHER LONG TERM INSULIN USE: Primary | ICD-10-CM

## 2025-05-15 DIAGNOSIS — I10 PRIMARY HYPERTENSION: Chronic | ICD-10-CM

## 2025-05-15 PROBLEM — Z01.89 LABORATORY TEST: Status: RESOLVED | Noted: 2018-12-11 | Resolved: 2025-05-15

## 2025-05-15 PROBLEM — E11.65 UNCONTROLLED TYPE 2 DIABETES MELLITUS WITH HYPERGLYCEMIA: Status: RESOLVED | Noted: 2022-09-14 | Resolved: 2025-05-15

## 2025-05-15 PROBLEM — R06.02 SOB (SHORTNESS OF BREATH): Status: RESOLVED | Noted: 2022-09-14 | Resolved: 2025-05-15

## 2025-05-15 PROBLEM — Z00.00 WELLNESS EXAMINATION: Status: RESOLVED | Noted: 2017-01-20 | Resolved: 2025-05-15

## 2025-05-15 PROBLEM — R73.01 ELEVATED FASTING GLUCOSE: Chronic | Status: RESOLVED | Noted: 2017-01-20 | Resolved: 2025-05-15

## 2025-05-15 LAB
EXPIRATION DATE: ABNORMAL
HBA1C MFR BLD: 6.1 % (ref 4.5–5.7)
Lab: ABNORMAL

## 2025-05-15 RX ORDER — MELOXICAM 15 MG/1
15 TABLET ORAL DAILY
Qty: 30 TABLET | Refills: 0 | Status: SHIPPED | OUTPATIENT
Start: 2025-05-15

## 2025-05-15 NOTE — PROGRESS NOTES
"Chief Complaint  Hypertension and Diabetes    Subjective      Hank Hanna presents to [facility]  History of Present Illness  The patient is a 50-year-old male who presents today for follow-up.    He has been managing his diabetes with Ozempic 2 mg, which he reports as effective. His A1c level was last recorded at 6.2 approximately 6 to 8 months ago and is currently 6.1, indicating stability. He has not experienced any weight loss since starting Ozempic but notes a decrease in weight following the adoption of a carnivore diet and reduction in carbohydrate intake. He does not eat pasta or much bread. He was previously on Mounjaro but switched back to Ozempic due to availability issues.    His blood pressure readings at home typically range from 134 to 136/82, although he did not take his medication this morning. He is currently on losartan 100 mg once daily and metoprolol succinate 100 mg.    He has a history of arthritis, with complete cartilage loss in both knees. He was advised to undergo knee replacement surgery 10 years ago but was later counseled to delay the procedure as long as possible. He was previously on diclofenac, which effectively managed his arthritis pain, but had to discontinue it due to kidney complications. He has also stopped all NSAIDs. He occasionally takes one diclofenac tablet when his pain becomes severe. He has not tried Tylenol and is considering duloxetine as a potential treatment option. He has not tried meloxicam. He has previously used tramadol for back pain.    He is on atorvastatin 20 mg for cholesterol management. He is curious about the connection between Alzheimer's and cholesterol.      Objective   Vital Signs:  /82   Pulse 71   Temp 98.3 °F (36.8 °C)   Resp 18   Ht 188 cm (74.02\")   Wt (!) 154 kg (339 lb)   SpO2 98%   BMI 43.51 kg/m²   Estimated body mass index is 43.51 kg/m² as calculated from the following:    Height as of this " "encounter: 188 cm (74.02\").    Weight as of this encounter: 154 kg (339 lb).            Physical Exam     Physical Exam        Result Review :  The following labs/imaging/notes were reviewed and discussed with the patient by Mendez Novak MD on 05/15/2025:   Latest Reference Range & Units 06/11/24 13:55 06/14/24 07:46 05/15/25 09:45   Sodium 136 - 145 mmol/L 139     Potassium 3.5 - 5.2 mmol/L 4.4     Chloride 98 - 107 mmol/L 104     CO2 22.0 - 29.0 mmol/L 24.2     BUN 6 - 20 mg/dL 20     Creatinine 0.76 - 1.27 mg/dL 1.25     BUN/Creatinine Ratio 7.0 - 25.0  16.0     EGFR Result >60.0 mL/min/1.73 69.7     Glucose 70 - 130 mg/dL 108 (H) 122    Calcium 8.6 - 10.5 mg/dL 9.4     Alkaline Phosphatase 39 - 117 U/L 97     Total Protein 6.0 - 8.5 g/dL 7.1     Albumin 3.5 - 5.2 g/dL 4.5     A/G Ratio g/dL 1.7     AST (SGOT) 1 - 40 U/L 28     ALT (SGPT) 1 - 41 U/L 43 (H)     Total Bilirubin 0.0 - 1.2 mg/dL 0.6     Hemoglobin A1C 4.5 - 5.7 %   6.1 !   Globulin gm/dL 2.6     WBC 3.40 - 10.80 10*3/mm3 7.04     RBC 4.14 - 5.80 10*6/mm3 4.66     Hemoglobin 13.0 - 17.7 g/dL 13.2     Hematocrit 37.5 - 51.0 % 39.8     Platelets 140 - 450 10*3/mm3 248     RDW 12.3 - 15.4 % 14.0     MCV 79.0 - 97.0 fL 85.4     MCH 26.6 - 33.0 pg 28.3     MCHC 31.5 - 35.7 g/dL 33.2     Neutrophil Rel % 42.7 - 76.0 % 63.8     Lymphocyte Rel % 19.6 - 45.3 % 23.6     Monocyte Rel % 5.0 - 12.0 % 9.8     Eosinophil Rel % 0.3 - 6.2 % 1.7     Basophil Rel % 0.0 - 1.5 % 0.7     Immature Granulocyte Rel % 0.0 - 0.5 % 0.4     Neutrophils Absolute 1.70 - 7.00 10*3/mm3 4.49     Lymphocytes Absolute 0.70 - 3.10 10*3/mm3 1.66     Monocytes Absolute 0.10 - 0.90 10*3/mm3 0.69     Eosinophils Absolute 0.00 - 0.40 10*3/mm3 0.12     Basophils Absolute 0.00 - 0.20 10*3/mm3 0.05     Immature Grans, Absolute 0.00 - 0.05 10*3/mm3 0.03     nRBC 0.0 - 0.2 /100 WBC 0.0     (H): Data is abnormally high  !: Data is abnormal          Assessment      Diagnoses and all orders for this " visit:    1. Controlled type 2 diabetes mellitus with diabetic nephropathy, unspecified whether long term insulin use (Primary)  -     POC Glycosylated Hemoglobin (Hb A1C)  -     CBC & Differential  -     Comprehensive Metabolic Panel  -     Lipid Panel  -     Magnesium  -     Microalbumin / Creatinine Urine Ratio - Urine, Clean Catch  -     Vitamin B12 & Folate    2. Primary hypertension  -     CBC & Differential  -     Comprehensive Metabolic Panel  -     Lipid Panel  -     Magnesium  -     Microalbumin / Creatinine Urine Ratio - Urine, Clean Catch  -     Vitamin B12 & Folate    3. Mixed hyperlipidemia  -     CBC & Differential  -     Comprehensive Metabolic Panel  -     Lipid Panel  -     Magnesium  -     Microalbumin / Creatinine Urine Ratio - Urine, Clean Catch  -     Vitamin B12 & Folate    4. Osteoarthritis of both knees, unspecified osteoarthritis type  -     CBC & Differential  -     Comprehensive Metabolic Panel  -     Lipid Panel  -     Magnesium  -     Microalbumin / Creatinine Urine Ratio - Urine, Clean Catch  -     Vitamin B12 & Folate    Other orders  -     meloxicam (MOBIC) 15 MG tablet; Take 1 tablet by mouth Daily.  Dispense: 30 tablet; Refill: 0             Assessment & Plan  1. Diabetes Mellitus.  - His A1c level is currently at 6.1, showing stability over the past 6-8 months.  - He is advised to continue with Ozempic 2 mg once weekly.  - A comprehensive set of laboratory tests will be conducted today, including CBC, CMP, lipid profile, magnesium, and microalbumin to monitor kidney function.  - He is advised that fasting is not necessary for the lab tests due to his diabetic status.    2. Hypertension.  - His blood pressure readings are slightly elevated today.  - He is advised to maintain a record of his home blood pressure readings for further evaluation and potential adjustment of his medication regimen.  - He is currently on losartan 100 mg once daily and metoprolol succinate 100 mg once  daily.  - He is reminded to take his blood pressure medications even on the day of lab work.    3. Arthritis.  - He has been experiencing arthritis pain due to the absence of cartilage in his knees.  - He is has history of kidney issues due to overuse of of oral diclofenic in the past. Labs look good today.   - Voltaren gel recommended. If the diclofenac cream proves ineffective, duloxetine may be considered as an alternative.  - A prescription for meloxicam will be provided, to be taken once or twice weekly with food. He is advised to avoid ibuprofen, naproxen, and other NSAIDs.    4. Hyperlipidemia.  - He is currently on atorvastatin 20 mg daily.  - His cholesterol levels were within normal limits during the last assessment.  - The potential risks and benefits of statin therapy were discussed, including the weak evidence linking statins to Alzheimer's disease.  - He is advised to continue his current atorvastatin regimen.    Follow-up  The patient will follow up in 6 months.    Orders Placed This Encounter   Procedures    Comprehensive Metabolic Panel     Release to patient:   Routine Release [[id number]]    Lipid Panel     Release to patient:   Routine Release [[id number]]    Magnesium     Release to patient:   Routine Release [[id number]]    Microalbumin / Creatinine Urine Ratio - Urine, Clean Catch     Release to patient:   Routine Release [[id number]]    Vitamin B12 & Folate     Release to patient:   Routine Release [[id number]]    POC Glycosylated Hemoglobin (Hb A1C)     Release to patient:   Routine Release [[id number]]    CBC & Differential     Manual Differential:   No     Release to patient:   Routine Release [[id number]]       Follow Up   Return in about 6 months (around 11/15/2025) for HTN,  DMII controlled, HLD, Knee arthritis.  Patient was given instructions and counseling regarding his condition or for health maintenance advice. Please see specific information pulled into the AVS if appropriate.          Patient or patient representative verbalized consent for the use of Ambient Listening during the visit with  Mendez Novak MD for chart documentation. 5/15/2025  10:38 CDT

## 2025-05-17 LAB
ALBUMIN SERPL-MCNC: 4.2 G/DL (ref 3.5–5.2)
ALBUMIN/CREAT UR: 8 MG/G CREAT (ref 0–29)
ALBUMIN/GLOB SERPL: 1.6 G/DL
ALP SERPL-CCNC: 103 U/L (ref 39–117)
ALT SERPL-CCNC: 32 U/L (ref 1–41)
AST SERPL-CCNC: 23 U/L (ref 1–40)
BASOPHILS # BLD AUTO: 0.08 10*3/MM3 (ref 0–0.2)
BASOPHILS NFR BLD AUTO: 1.1 % (ref 0–1.5)
BILIRUB SERPL-MCNC: 0.6 MG/DL (ref 0–1.2)
BUN SERPL-MCNC: 15 MG/DL (ref 6–20)
BUN/CREAT SERPL: 12.1 (ref 7–25)
CALCIUM SERPL-MCNC: 9.1 MG/DL (ref 8.6–10.5)
CHLORIDE SERPL-SCNC: 104 MMOL/L (ref 98–107)
CHOLEST SERPL-MCNC: 197 MG/DL (ref 0–200)
CO2 SERPL-SCNC: 24 MMOL/L (ref 22–29)
CREAT SERPL-MCNC: 1.24 MG/DL (ref 0.76–1.27)
CREAT UR-MCNC: 137.9 MG/DL
EGFRCR SERPLBLD CKD-EPI 2021: 70 ML/MIN/1.73
EOSINOPHIL # BLD AUTO: 0.09 10*3/MM3 (ref 0–0.4)
EOSINOPHIL NFR BLD AUTO: 1.2 % (ref 0.3–6.2)
ERYTHROCYTE [DISTWIDTH] IN BLOOD BY AUTOMATED COUNT: 13.4 % (ref 12.3–15.4)
FOLATE SERPL-MCNC: 16.7 NG/ML (ref 4.78–24.2)
GLOBULIN SER CALC-MCNC: 2.7 GM/DL
GLUCOSE SERPL-MCNC: 92 MG/DL (ref 65–99)
HCT VFR BLD AUTO: 45.3 % (ref 37.5–51)
HDLC SERPL-MCNC: 49 MG/DL (ref 40–60)
HGB BLD-MCNC: 14.1 G/DL (ref 13–17.7)
IMM GRANULOCYTES # BLD AUTO: 0.04 10*3/MM3 (ref 0–0.05)
IMM GRANULOCYTES NFR BLD AUTO: 0.5 % (ref 0–0.5)
LDLC SERPL CALC-MCNC: 131 MG/DL (ref 0–100)
LYMPHOCYTES # BLD AUTO: 2 10*3/MM3 (ref 0.7–3.1)
LYMPHOCYTES NFR BLD AUTO: 27.2 % (ref 19.6–45.3)
MAGNESIUM SERPL-MCNC: 1.9 MG/DL (ref 1.6–2.6)
MCH RBC QN AUTO: 28 PG (ref 26.6–33)
MCHC RBC AUTO-ENTMCNC: 31.1 G/DL (ref 31.5–35.7)
MCV RBC AUTO: 89.9 FL (ref 79–97)
MICROALBUMIN UR-MCNC: 10.6 UG/ML
MONOCYTES # BLD AUTO: 0.69 10*3/MM3 (ref 0.1–0.9)
MONOCYTES NFR BLD AUTO: 9.4 % (ref 5–12)
NEUTROPHILS # BLD AUTO: 4.45 10*3/MM3 (ref 1.7–7)
NEUTROPHILS NFR BLD AUTO: 60.6 % (ref 42.7–76)
NRBC BLD AUTO-RTO: 0 /100 WBC (ref 0–0.2)
PLATELET # BLD AUTO: 257 10*3/MM3 (ref 140–450)
POTASSIUM SERPL-SCNC: 4.6 MMOL/L (ref 3.5–5.2)
PROT SERPL-MCNC: 6.9 G/DL (ref 6–8.5)
RBC # BLD AUTO: 5.04 10*6/MM3 (ref 4.14–5.8)
SODIUM SERPL-SCNC: 139 MMOL/L (ref 136–145)
TRIGL SERPL-MCNC: 95 MG/DL (ref 0–150)
VIT B12 SERPL-MCNC: 572 PG/ML (ref 211–946)
VLDLC SERPL CALC-MCNC: 17 MG/DL (ref 5–40)
WBC # BLD AUTO: 7.35 10*3/MM3 (ref 3.4–10.8)

## 2025-06-11 ENCOUNTER — TELEPHONE (OUTPATIENT)
Dept: FAMILY MEDICINE CLINIC | Facility: CLINIC | Age: 50
End: 2025-06-11

## 2025-06-11 RX ORDER — MELOXICAM 15 MG/1
15 TABLET ORAL DAILY
Qty: 30 TABLET | Refills: 0 | Status: SHIPPED | OUTPATIENT
Start: 2025-06-11 | End: 2025-06-11 | Stop reason: SDUPTHER

## 2025-06-11 RX ORDER — MELOXICAM 15 MG/1
15 TABLET ORAL DAILY
Qty: 90 TABLET | Refills: 0 | Status: SHIPPED | OUTPATIENT
Start: 2025-06-11

## 2025-06-11 NOTE — TELEPHONE ENCOUNTER
Caller: Hank Hanna    Relationship to patient: Self    Best call back number:   Telephone Information:   Mobile [phone]        Patient is needing: meloxicam (MOBIC) 15 MG tablet [[id number]] (Order [id number]) RE SENT AS A 90 DAY SUPPLY DUE TO INSURANCE PURPOSES. PLEASE CALL ONCE RESENT

## 2025-08-01 ENCOUNTER — TELEPHONE (OUTPATIENT)
Dept: FAMILY MEDICINE CLINIC | Facility: CLINIC | Age: 50
End: 2025-08-01
Payer: COMMERCIAL

## 2025-08-01 NOTE — TELEPHONE ENCOUNTER
Pt called stating that his ozempic is normally a three month refill. Pt is requesting this. Please advise

## 2025-08-04 DIAGNOSIS — E11.65 UNCONTROLLED TYPE 2 DIABETES MELLITUS WITH HYPERGLYCEMIA: ICD-10-CM

## 2025-08-04 RX ORDER — SEMAGLUTIDE 2.68 MG/ML
2 INJECTION, SOLUTION SUBCUTANEOUS WEEKLY
Qty: 9 ML | Refills: 0 | Status: SHIPPED | OUTPATIENT
Start: 2025-08-04

## (undated) DEVICE — YANKAUER,BULB TIP WITH VENT: Brand: ARGYLE

## (undated) DEVICE — THE SINGLE USE ETRAP – POLYP TRAP IS USED FOR SUCTION RETRIEVAL OF ENDOSCOPICALLY REMOVED POLYPS.: Brand: ETRAP

## (undated) DEVICE — SENSR O2 OXIMAX FNGR A/ 18IN NONSTR

## (undated) DEVICE — Device: Brand: DEFENDO AIR/WATER/SUCTION AND BIOPSY VALVE

## (undated) DEVICE — CUFF,BP,DISP,1 TUBE,ADULT,HP: Brand: MEDLINE

## (undated) DEVICE — MASK,OXYGEN,MED CONC,ADLT,7' TUB, UC: Brand: PENDING

## (undated) DEVICE — CONMED SCOPE SAVER BITE BLOCK, 20X27 MM: Brand: SCOPE SAVER

## (undated) DEVICE — CAPS PILLCAM ENDO SB3EX

## (undated) DEVICE — TBG SMPL FLTR LINE NASL 02/C02 A/ BX/100

## (undated) DEVICE — THE CHANNEL CLEANING BRUSH IS A NYLON FLEXI BRUSH ATTACHED TO A FLEXIBLE PLASTIC SHEATH DESIGNED TO SAFELY REMOVE DEBRIS FROM FLEXIBLE ENDOSCOPES.

## (undated) DEVICE — FRCP BX RADJAW4 NDL 2.8 240 STD OG

## (undated) DEVICE — SNAR POLYP CAPTIVATOR MICROHEX 13 240CM